# Patient Record
Sex: FEMALE | NOT HISPANIC OR LATINO | URBAN - METROPOLITAN AREA
[De-identification: names, ages, dates, MRNs, and addresses within clinical notes are randomized per-mention and may not be internally consistent; named-entity substitution may affect disease eponyms.]

---

## 2017-01-19 ENCOUNTER — CHARTING TRANS (OUTPATIENT)
Dept: OCCUPATIONAL MEDICINE | Age: 57
End: 2017-01-19

## 2017-01-26 ENCOUNTER — CHARTING TRANS (OUTPATIENT)
Dept: OCCUPATIONAL MEDICINE | Age: 57
End: 2017-01-26

## 2017-01-26 ENCOUNTER — IMAGING SERVICES (OUTPATIENT)
Dept: OTHER | Age: 57
End: 2017-01-26

## 2017-02-06 ENCOUNTER — CHARTING TRANS (OUTPATIENT)
Dept: OCCUPATIONAL MEDICINE | Age: 57
End: 2017-02-06

## 2017-02-16 ENCOUNTER — CHARTING TRANS (OUTPATIENT)
Dept: OCCUPATIONAL MEDICINE | Age: 57
End: 2017-02-16

## 2017-02-23 ENCOUNTER — CHARTING TRANS (OUTPATIENT)
Dept: OCCUPATIONAL MEDICINE | Age: 57
End: 2017-02-23

## 2017-03-02 ENCOUNTER — CHARTING TRANS (OUTPATIENT)
Dept: OTHER | Age: 57
End: 2017-03-02

## 2017-03-03 ENCOUNTER — CHARTING TRANS (OUTPATIENT)
Dept: OTHER | Age: 57
End: 2017-03-03

## 2017-03-22 ENCOUNTER — CHARTING TRANS (OUTPATIENT)
Dept: OTHER | Age: 57
End: 2017-03-22

## 2017-06-15 ENCOUNTER — HOSPITAL ENCOUNTER (OUTPATIENT)
Dept: MRI IMAGING | Facility: HOSPITAL | Age: 57
Discharge: HOME | End: 2017-06-15
Attending: SURGERY
Payer: COMMERCIAL

## 2017-06-15 DIAGNOSIS — Z12.39 BREAST CANCER SCREENING, HIGH RISK PATIENT: ICD-10-CM

## 2017-06-15 DIAGNOSIS — Z91.89 AT HIGH RISK FOR BREAST CANCER: ICD-10-CM

## 2017-06-15 DIAGNOSIS — R92.2 DENSE BREAST: ICD-10-CM

## 2017-06-15 DIAGNOSIS — Z98.890 H/O RIGHT BREAST BIOPSY: ICD-10-CM

## 2017-06-15 DIAGNOSIS — N60.92 ATYPICAL DUCTAL HYPERPLASIA OF LEFT BREAST: ICD-10-CM

## 2017-06-15 PROCEDURE — 77059 MRI BREAST (W+WO) W/CAD BILAT (CPT=77059/0159T): CPT | Performed by: SURGERY

## 2017-06-15 PROCEDURE — 0159T MRI BREAST (W+WO) W/CAD BILAT (CPT=77059/0159T): CPT | Performed by: SURGERY

## 2017-06-15 PROCEDURE — A9575 INJ GADOTERATE MEGLUMI 0.1ML: HCPCS | Performed by: SURGERY

## 2017-06-27 ENCOUNTER — OFFICE VISIT (OUTPATIENT)
Dept: SURGERY | Facility: CLINIC | Age: 57
End: 2017-06-27

## 2017-06-27 VITALS
WEIGHT: 145.81 LBS | TEMPERATURE: 99 F | RESPIRATION RATE: 18 BRPM | BODY MASS INDEX: 23 KG/M2 | HEART RATE: 91 BPM | SYSTOLIC BLOOD PRESSURE: 92 MMHG | OXYGEN SATURATION: 98 % | DIASTOLIC BLOOD PRESSURE: 59 MMHG

## 2017-06-27 DIAGNOSIS — Z12.39 BREAST CANCER SCREENING, HIGH RISK PATIENT: ICD-10-CM

## 2017-06-27 DIAGNOSIS — N60.82 FLAT EPITHELIAL ATYPIA OF LEFT BREAST: ICD-10-CM

## 2017-06-27 DIAGNOSIS — Z91.89 AT HIGH RISK FOR BREAST CANCER: ICD-10-CM

## 2017-06-27 DIAGNOSIS — N60.92 ATYPICAL DUCTAL HYPERPLASIA OF LEFT BREAST: Primary | ICD-10-CM

## 2017-06-27 PROCEDURE — 99214 OFFICE O/P EST MOD 30 MIN: CPT | Performed by: SURGERY

## 2017-10-16 NOTE — PROGRESS NOTES
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     Stage: N/A    Date of Surgery: July 2015    Status and Adjuvant Therapy:  Radiation The atypia. Her bilateral mammogram in December 2016 was unremarkable. MRI in June 2017 is unremarkable. She is here for recommendations regarding further management.      Past Medical History:   Diagnosis Date   • Amenorrhea    • Anemia     as a child   • A times @50 y/o   • Heart Disorder Brother    • Hypertension Brother    She is not of Ashkenazi Scientologist ancestry. Social History:    Alcohol use No       Smoking status: Never Smoker   Smokeless tobacco: Never Used   The patient has 2 children.     Review o bleeding or persistent swollen glands or lymph nodes. Musculoskeletal:  The patient denies muscle aches/pain, joint pain, stiff joints, neck pain, back pain or bone pain.     Neuropsychiatric:  There is no history of migraines or severe headaches, seizu Aletha Funes is a 56year old woman s/p Left breast excisional biopsy for left breast ADH/FEA as well as a family history of breast cancer.     Discussion and Plan:  I had a discussion with the Patient regarding her breast exam. On exam today, she has reviewed that the FDA approved the use of Tamoxifen for breast cancer risk reduction in premenopausal women at increased risk for breast cancer based upon the results of the NSABP Breast Cancer Prevention Trial in 1998.  The criteria for inclusion included

## 2017-11-20 ENCOUNTER — OFFICE VISIT (OUTPATIENT)
Dept: OBGYN CLINIC | Facility: CLINIC | Age: 57
End: 2017-11-20

## 2017-11-20 VITALS
WEIGHT: 154 LBS | DIASTOLIC BLOOD PRESSURE: 72 MMHG | SYSTOLIC BLOOD PRESSURE: 112 MMHG | BODY MASS INDEX: 24.17 KG/M2 | HEIGHT: 67 IN

## 2017-11-20 DIAGNOSIS — Z01.419 WELL FEMALE EXAM WITH ROUTINE GYNECOLOGICAL EXAM: Primary | ICD-10-CM

## 2017-11-20 DIAGNOSIS — Z12.4 SCREENING FOR MALIGNANT NEOPLASM OF CERVIX: ICD-10-CM

## 2017-11-20 PROCEDURE — 88175 CYTOPATH C/V AUTO FLUID REDO: CPT | Performed by: OBSTETRICS & GYNECOLOGY

## 2017-11-20 PROCEDURE — 99396 PREV VISIT EST AGE 40-64: CPT | Performed by: OBSTETRICS & GYNECOLOGY

## 2017-11-20 RX ORDER — INFLUENZA A VIRUS A/CHRISTCHURCH/16/2010 NIB-74 (H1N1) HEMAGGLUTININ ANTIGEN (PROPIOLACTONE INACTIVATED), INFLUENZA A VIRUS A/SWITZERLAND/9715293/2013, NIB-88 (H3N2) HEMAGGLUTININ ANTIGEN (PROPIOLACTONE INACTIVATED), INFLUENZA B VIRUS B/PHUKET/3073/2013 - WILD TYPE HEMAGGLUTININ ANTIGEN (PROPIOLACTONE INACTIVATED) 15; 15; 15 UG/.5ML; UG/.5ML; UG/.5ML
INJECTION, SUSPENSION INTRAMUSCULAR
Refills: 0 | COMMUNITY
Start: 2017-10-07 | End: 2018-07-12

## 2017-11-20 NOTE — PROGRESS NOTES
Annual  No C/O  ? Back lesions  Left mass under ribs? Menopausal, sister with breast cancer, recent hysterectomy for uterine cancer (was bleeding)  Kids are well, one at [address] [place] [address], one at [facility]    ROS: No Cardiac, Respiratory, GI,  or Neurological symptoms.

## 2018-05-12 ENCOUNTER — HOSPITAL ENCOUNTER (OUTPATIENT)
Dept: MAMMOGRAPHY | Facility: HOSPITAL | Age: 58
Discharge: HOME | End: 2018-05-12
Attending: SURGERY
Payer: COMMERCIAL

## 2018-05-12 DIAGNOSIS — Z91.89 AT HIGH RISK FOR BREAST CANCER: ICD-10-CM

## 2018-05-12 DIAGNOSIS — Z12.39 BREAST CANCER SCREENING, HIGH RISK PATIENT: ICD-10-CM

## 2018-05-12 PROCEDURE — 77067 SCR MAMMO BI INCL CAD: CPT | Performed by: SURGERY

## 2018-05-12 PROCEDURE — 77063 BREAST TOMOSYNTHESIS BI: CPT | Performed by: SURGERY

## 2018-05-22 ENCOUNTER — TELEPHONE (OUTPATIENT)
Dept: SURGERY | Facility: CLINIC | Age: 58
End: 2018-05-22

## 2018-05-22 NOTE — TELEPHONE ENCOUNTER
Mailbox was full. Unable to leave a message regarding her questions about recommended follow up and surveillance.

## 2018-05-24 ENCOUNTER — TELEPHONE (OUTPATIENT)
Dept: SURGERY | Facility: CLINIC | Age: 58
End: 2018-05-24

## 2018-05-24 NOTE — TELEPHONE ENCOUNTER
Unable to leave a VM, mailbox full. Pt had left a message with questions about her plan of care, and surveillance.

## 2018-05-29 ENCOUNTER — TELEPHONE (OUTPATIENT)
Dept: SURGERY | Facility: CLINIC | Age: 58
End: 2018-05-29

## 2018-05-29 NOTE — TELEPHONE ENCOUNTER
LVM addressing pt's questions about follow up with dr Evelio Draper. I let her know Dr Evelio Draper mentioned annual MRI as well as mammogram for her surveillance. Her last visit was June 2017, and advised her to call for an appointment in June.   At that visit,

## 2018-06-22 ENCOUNTER — OFFICE VISIT (OUTPATIENT)
Dept: SURGERY | Facility: CLINIC | Age: 58
End: 2018-06-22

## 2018-06-22 VITALS
SYSTOLIC BLOOD PRESSURE: 118 MMHG | BODY MASS INDEX: 25.27 KG/M2 | OXYGEN SATURATION: 97 % | HEIGHT: 67 IN | HEART RATE: 66 BPM | RESPIRATION RATE: 18 BRPM | WEIGHT: 161 LBS | DIASTOLIC BLOOD PRESSURE: 75 MMHG

## 2018-06-22 DIAGNOSIS — Z91.89 AT HIGH RISK FOR BREAST CANCER: ICD-10-CM

## 2018-06-22 DIAGNOSIS — N60.92 ATYPICAL DUCTAL HYPERPLASIA OF LEFT BREAST: Primary | ICD-10-CM

## 2018-06-22 DIAGNOSIS — Z12.39 BREAST CANCER SCREENING, HIGH RISK PATIENT: ICD-10-CM

## 2018-06-22 PROCEDURE — 99214 OFFICE O/P EST MOD 30 MIN: CPT | Performed by: SURGERY

## 2018-06-22 NOTE — PROGRESS NOTES
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     Stage: N/A    Date of Surgery: July 2015    Status and Adjuvant Therapy:  Radiation The atypia. MRI in June 2017 is unremarkable. A bilateral 3D screening mammogram was performed on May 12, 2018 and demonstrated heterogeneously dense breast tissue with no new concerns bilaterally.   She is here for recommendations regarding further managemen History   Problem Relation Age of Onset   • Heart Disorder Father    • Hypertension Father    • Lipids Mother    • Other Carolina Kimbrough Mother      hypothyroid   • Cancer Sister      breast   • Breast Cancer Sister 40-genetic testing negative     two times @48 y/ decreased urine stream, blood in the urine or vaginal/penile discharge. Skin:    The patient denies rash, itching, skin lesions, dry skin, change in skin color or change in moles.      Hematologic/Lymphatic:  The patient denies easily bruising or bleedin 9.6%  Tyrer-Cuzick Five Year is: 5.7%  Tyrer-Cuzick Lifetime is: 38.9%    Probability of BRCA 1 or 2 alteration is:  0.2% using BRCA PRO, and 0.3% using Tyrer-Cuzick, and 1.5% using Myriad Prevalence Tables.     Impression:   Ms. Teddy Abbott is a 56 to be staggered six months in relation with her annual mammogram.       With regard to risk-reducing interventions, we discussed lifestyle modifications including attention to diet, exercise, weight control, moderation in alcohol use, and avoidance of long

## 2018-09-25 PROCEDURE — 88342 IMHCHEM/IMCYTCHM 1ST ANTB: CPT | Performed by: INTERNAL MEDICINE

## 2018-09-25 PROCEDURE — 88305 TISSUE EXAM BY PATHOLOGIST: CPT | Performed by: INTERNAL MEDICINE

## 2018-11-08 ENCOUNTER — OFFICE VISIT (OUTPATIENT)
Dept: OBGYN CLINIC | Facility: CLINIC | Age: 58
End: 2018-11-08
Payer: COMMERCIAL

## 2018-11-08 VITALS
HEIGHT: 67 IN | DIASTOLIC BLOOD PRESSURE: 70 MMHG | SYSTOLIC BLOOD PRESSURE: 118 MMHG | HEART RATE: 84 BPM | RESPIRATION RATE: 16 BRPM | WEIGHT: 165 LBS | BODY MASS INDEX: 25.9 KG/M2

## 2018-11-08 DIAGNOSIS — Z01.419 WELL FEMALE EXAM WITH ROUTINE GYNECOLOGICAL EXAM: Primary | ICD-10-CM

## 2018-11-08 DIAGNOSIS — Z12.4 SCREENING FOR MALIGNANT NEOPLASM OF CERVIX: ICD-10-CM

## 2018-11-08 PROCEDURE — 88175 CYTOPATH C/V AUTO FLUID REDO: CPT | Performed by: OBSTETRICS & GYNECOLOGY

## 2018-11-08 PROCEDURE — 99396 PREV VISIT EST AGE 40-64: CPT | Performed by: OBSTETRICS & GYNECOLOGY

## 2018-11-08 RX ORDER — AZITHROMYCIN 250 MG/1
TABLET, FILM COATED ORAL
Qty: 6 TABLET | Refills: 0 | Status: SHIPPED | OUTPATIENT
Start: 2018-11-08 | End: 2018-11-12

## 2018-11-08 NOTE — PROGRESS NOTES
Annual  No C/O  Son Prince Joya graduated, is working in [facility] [facility]  Daughter Heidi Malik is sophomore at [employer]  She has sinus infection, was on Amoxacillin, Dr. Verito Wells usually gives her Z omi  Wants PAP  Kaley for mammograms    ROS: No Cardiac, Respiratory, GI,  or N

## 2018-11-14 ENCOUNTER — TELEPHONE (OUTPATIENT)
Dept: SURGERY | Facility: CLINIC | Age: 58
End: 2018-11-14

## 2018-11-14 NOTE — TELEPHONE ENCOUNTER
Returned patient call regarding what imaging she is due at this time. I LVM letting her know she is due breast MRI in November, and she can call central scheduling to arrange.    I let her know the authorization will be obtained once it's scheduled, and s

## 2018-11-29 VITALS — TEMPERATURE: 98 F | DIASTOLIC BLOOD PRESSURE: 77 MMHG | SYSTOLIC BLOOD PRESSURE: 116 MMHG | HEART RATE: 78 BPM

## 2018-11-29 VITALS — DIASTOLIC BLOOD PRESSURE: 57 MMHG | SYSTOLIC BLOOD PRESSURE: 108 MMHG | HEART RATE: 85 BPM | TEMPERATURE: 98.2 F

## 2018-11-29 VITALS
DIASTOLIC BLOOD PRESSURE: 73 MMHG | BODY MASS INDEX: 23.78 KG/M2 | RESPIRATION RATE: 16 BRPM | TEMPERATURE: 97 F | SYSTOLIC BLOOD PRESSURE: 120 MMHG | WEIGHT: 148 LBS | HEIGHT: 66 IN | HEART RATE: 74 BPM

## 2018-11-29 VITALS — HEART RATE: 67 BPM | TEMPERATURE: 98.7 F | SYSTOLIC BLOOD PRESSURE: 111 MMHG | DIASTOLIC BLOOD PRESSURE: 68 MMHG

## 2018-11-29 VITALS — DIASTOLIC BLOOD PRESSURE: 82 MMHG | SYSTOLIC BLOOD PRESSURE: 116 MMHG | HEART RATE: 72 BPM | TEMPERATURE: 98.9 F

## 2018-12-22 PROCEDURE — 83516 IMMUNOASSAY NONANTIBODY: CPT | Performed by: INTERNAL MEDICINE

## 2019-05-31 PROBLEM — E78.00 HYPERCHOLESTEROLEMIA: Status: ACTIVE | Noted: 2019-05-31

## 2019-05-31 PROCEDURE — 86803 HEPATITIS C AB TEST: CPT | Performed by: INTERNAL MEDICINE

## 2019-06-05 ENCOUNTER — HOSPITAL ENCOUNTER (OUTPATIENT)
Dept: MRI IMAGING | Facility: HOSPITAL | Age: 59
Discharge: HOME | End: 2019-06-05
Attending: SURGERY
Payer: COMMERCIAL

## 2019-06-05 DIAGNOSIS — Z91.89 AT HIGH RISK FOR BREAST CANCER: ICD-10-CM

## 2019-06-05 DIAGNOSIS — N60.92 ATYPICAL DUCTAL HYPERPLASIA OF LEFT BREAST: ICD-10-CM

## 2019-06-05 DIAGNOSIS — R92.8 ABNORMAL MRI, BREAST: Primary | ICD-10-CM

## 2019-06-05 DIAGNOSIS — Z12.39 BREAST CANCER SCREENING, HIGH RISK PATIENT: ICD-10-CM

## 2019-06-05 PROCEDURE — A9575 INJ GADOTERATE MEGLUMI 0.1ML: HCPCS

## 2019-06-05 PROCEDURE — 77049 MRI BREAST C-+ W/CAD BI: CPT | Performed by: SURGERY

## 2019-06-10 ENCOUNTER — HOSPITAL ENCOUNTER (OUTPATIENT)
Dept: MAMMOGRAPHY | Facility: HOSPITAL | Age: 59
Discharge: HOME | End: 2019-06-10
Attending: SURGERY
Payer: COMMERCIAL

## 2019-06-10 DIAGNOSIS — R92.8 ABNORMAL MRI, BREAST: ICD-10-CM

## 2019-06-10 PROCEDURE — 76642 ULTRASOUND BREAST LIMITED: CPT | Performed by: SURGERY

## 2019-06-20 NOTE — PROGRESS NOTES
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     Stage: N/A    Date of Surgery: July 2015    Status and Adjuvant Therapy:  Radiation The atypia. MRI in June 2017 is unremarkable. A bilateral 3D screening mammogram was performed on May 12, 2018 and demonstrated heterogeneously dense breast tissue with no new concerns bilaterally.   She had a breast MRI on June 5, 2019 that showed  a 7 mm in years, last many years ago. She has recieved infertility treatment to achieve pregnancy. Medications:        Current Outpatient Medications on File Prior to Visit:  atorvastatin 20 MG Oral Tab Take 1 tablet (20 mg total) by mouth daily.  Disp: 90 tablet heartbeat, fainting or near-fainting, difficulty breathing when lying flat, SOB/Coughing at night, swelling of the legs or chest pain while walking.     Breasts:  See history of present illness    Gastrointestinal:     There is no history of difficulty or p There is not palpable cervical, supraclavicular or axillary adenopathy. The neck is supple with a midline trachea and no thyromegaly. Range of motion is good at both shoulders.  Breasts are symmetrical. The tumorectomy site is in the lower outer left  mariana be her sister with a personal h/o breast cancer who tested negative. Her sister has undergone testing that was negative and therefore no further testing is recommended at this time.   She was given information regarding our cancer genetic program here at [facility]  She should see me annually for clinical exam.  She has been encouraged to contact the office with any questions/concerns prior to her next visit.     This encounter lasted a total of 25 minutes, more than 50% of which was dedicated to the discussion o

## 2019-06-21 ENCOUNTER — OFFICE VISIT (OUTPATIENT)
Dept: SURGERY | Facility: CLINIC | Age: 59
End: 2019-06-21
Payer: COMMERCIAL

## 2019-06-21 VITALS
HEART RATE: 81 BPM | BODY MASS INDEX: 25 KG/M2 | TEMPERATURE: 97 F | SYSTOLIC BLOOD PRESSURE: 118 MMHG | WEIGHT: 160 LBS | DIASTOLIC BLOOD PRESSURE: 75 MMHG | RESPIRATION RATE: 20 BRPM

## 2019-06-21 DIAGNOSIS — Z91.89 AT HIGH RISK FOR BREAST CANCER: ICD-10-CM

## 2019-06-21 DIAGNOSIS — N60.99 ATYPICAL DUCTAL HYPERPLASIA OF BREAST: Primary | ICD-10-CM

## 2019-06-21 PROCEDURE — 99214 OFFICE O/P EST MOD 30 MIN: CPT | Performed by: SURGERY

## 2019-11-18 ENCOUNTER — OFFICE VISIT (OUTPATIENT)
Dept: OBGYN CLINIC | Facility: CLINIC | Age: 59
End: 2019-11-18
Payer: COMMERCIAL

## 2019-11-18 VITALS
HEIGHT: 67 IN | SYSTOLIC BLOOD PRESSURE: 124 MMHG | HEART RATE: 66 BPM | BODY MASS INDEX: 25.74 KG/M2 | WEIGHT: 164 LBS | DIASTOLIC BLOOD PRESSURE: 80 MMHG

## 2019-11-18 DIAGNOSIS — Z12.4 SCREENING FOR MALIGNANT NEOPLASM OF CERVIX: ICD-10-CM

## 2019-11-18 DIAGNOSIS — Z01.419 WELL FEMALE EXAM WITH ROUTINE GYNECOLOGICAL EXAM: Primary | ICD-10-CM

## 2019-11-18 PROCEDURE — 88175 CYTOPATH C/V AUTO FLUID REDO: CPT | Performed by: OBSTETRICS & GYNECOLOGY

## 2019-11-18 PROCEDURE — 99396 PREV VISIT EST AGE 40-64: CPT | Performed by: OBSTETRICS & GYNECOLOGY

## 2019-11-18 NOTE — PROGRESS NOTES
Annual  No C/O  Nothing new  Kids are well  Mother has to have surgery again, prosthesis in vulva    ROS: No Cardiac, Respiratory, GI,  or Neurological symptoms.     PE:  GENERAL: well developed, well nourished, in no apparent distress alert oriented x 3

## 2019-12-23 ENCOUNTER — HOSPITAL ENCOUNTER (OUTPATIENT)
Dept: MAMMOGRAPHY | Facility: HOSPITAL | Age: 59
Discharge: HOME | End: 2019-12-23
Attending: SURGERY
Payer: COMMERCIAL

## 2019-12-23 DIAGNOSIS — N60.99 ATYPICAL HYPERPLASIA OF BREAST: ICD-10-CM

## 2019-12-23 DIAGNOSIS — N60.99 ATYPICAL DUCTAL HYPERPLASIA OF BREAST: ICD-10-CM

## 2019-12-23 DIAGNOSIS — R92.8 ABNORMAL MRI, BREAST: Primary | ICD-10-CM

## 2019-12-23 DIAGNOSIS — Z91.89 AT HIGH RISK FOR BREAST CANCER: ICD-10-CM

## 2019-12-23 PROCEDURE — 77066 DX MAMMO INCL CAD BI: CPT | Performed by: SURGERY

## 2019-12-23 PROCEDURE — 77062 BREAST TOMOSYNTHESIS BI: CPT | Performed by: SURGERY

## 2020-06-15 ENCOUNTER — HOSPITAL ENCOUNTER (OUTPATIENT)
Dept: MRI IMAGING | Facility: HOSPITAL | Age: 60
Discharge: HOME | End: 2020-06-15
Attending: SURGERY
Payer: COMMERCIAL

## 2020-06-15 DIAGNOSIS — N60.99 ATYPICAL HYPERPLASIA OF BREAST: ICD-10-CM

## 2020-06-15 DIAGNOSIS — Z91.89 AT HIGH RISK FOR BREAST CANCER: ICD-10-CM

## 2020-06-15 DIAGNOSIS — R92.8 ABNORMAL MRI, BREAST: ICD-10-CM

## 2020-06-15 PROCEDURE — 77049 MRI BREAST C-+ W/CAD BI: CPT | Performed by: SURGERY

## 2020-06-15 PROCEDURE — A9575 INJ GADOTERATE MEGLUMI 0.1ML: HCPCS | Performed by: SURGERY

## 2020-06-23 ENCOUNTER — OFFICE VISIT (OUTPATIENT)
Dept: SURGERY | Facility: CLINIC | Age: 60
End: 2020-06-23
Payer: COMMERCIAL

## 2020-06-23 VITALS
DIASTOLIC BLOOD PRESSURE: 73 MMHG | OXYGEN SATURATION: 94 % | BODY MASS INDEX: 25.11 KG/M2 | RESPIRATION RATE: 18 BRPM | WEIGHT: 160 LBS | SYSTOLIC BLOOD PRESSURE: 110 MMHG | HEART RATE: 73 BPM | HEIGHT: 67 IN | TEMPERATURE: 99 F

## 2020-06-23 DIAGNOSIS — Z12.31 ENCOUNTER FOR SCREENING MAMMOGRAM FOR BREAST CANCER: ICD-10-CM

## 2020-06-23 DIAGNOSIS — N60.99 ATYPICAL DUCTAL HYPERPLASIA OF BREAST: Primary | ICD-10-CM

## 2020-06-23 DIAGNOSIS — Z91.89 AT HIGH RISK FOR BREAST CANCER: ICD-10-CM

## 2020-06-23 PROCEDURE — 99214 OFFICE O/P EST MOD 30 MIN: CPT | Performed by: SURGERY

## 2020-06-23 NOTE — PROGRESS NOTES
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     Stage: N/A    Date of Surgery: July 2015    Status and Adjuvant Therapy:  Radiation The • Anemia     as a child   • Anesthesia complication     too much epidural with 1st  was paralyzed from jaw done   • Atypical ductal hyperplasia of left breast 2015   • H/O mammogram 2013    negative    • HEADACHES    • History of shin Disorder Father    • Hypertension Father    • Lipids Mother    • Other Hiral Crowe Mother      hypothyroid   • Cancer Sister      breast   • Breast Cancer Sister 40-genetic testing negative     two times @46 y/o   • Heart Disorder Brother    • Hypertension Bro vaginal/penile discharge. Skin:    The patient denies rash, itching, skin lesions, dry skin, change in skin color or change in moles. Hematologic/Lymphatic:  The patient denies easily bruising or bleeding or persistent swollen glands or lymph nodes. 9.6%  Tyrer-Cuzick Five Year is: 5.7%  Tyrer-Cuzick Lifetime is: 38.9%    Probability of BRCA 1 or 2 alteration is:  0.2% using BRCA PRO, and 0.3% using Tyrer-Cuzick, and 1.5% using Myriad Prevalence Tables.     Impression:   Ms. Enio Olivo is a 64 to be staggered six months in relation with her annual mammogram.       With regard to risk-reducing interventions, we discussed lifestyle modifications including attention to diet, exercise, weight control, moderation in alcohol use, and avoidance of long

## 2020-12-18 ENCOUNTER — OFFICE VISIT (OUTPATIENT)
Dept: OBGYN CLINIC | Facility: CLINIC | Age: 60
End: 2020-12-18
Payer: COMMERCIAL

## 2020-12-18 VITALS
DIASTOLIC BLOOD PRESSURE: 80 MMHG | SYSTOLIC BLOOD PRESSURE: 120 MMHG | WEIGHT: 164 LBS | BODY MASS INDEX: 26.36 KG/M2 | HEIGHT: 66 IN

## 2020-12-18 DIAGNOSIS — Z01.419 WELL FEMALE EXAM WITH ROUTINE GYNECOLOGICAL EXAM: Primary | ICD-10-CM

## 2020-12-18 DIAGNOSIS — Z12.4 SCREENING FOR MALIGNANT NEOPLASM OF CERVIX: ICD-10-CM

## 2020-12-18 PROCEDURE — 88175 CYTOPATH C/V AUTO FLUID REDO: CPT | Performed by: OBSTETRICS & GYNECOLOGY

## 2020-12-18 PROCEDURE — 3079F DIAST BP 80-89 MM HG: CPT | Performed by: OBSTETRICS & GYNECOLOGY

## 2020-12-18 PROCEDURE — 99396 PREV VISIT EST AGE 40-64: CPT | Performed by: OBSTETRICS & GYNECOLOGY

## 2020-12-18 PROCEDURE — 3074F SYST BP LT 130 MM HG: CPT | Performed by: OBSTETRICS & GYNECOLOGY

## 2020-12-18 PROCEDURE — 3008F BODY MASS INDEX DOCD: CPT | Performed by: OBSTETRICS & GYNECOLOGY

## 2020-12-18 NOTE — PROGRESS NOTES
Annual  No C/O  Feels well  Always concerns for Breast and ovarian cancer, had friends with dx, discussed ovarian cancer surveillance  Kids are well  Seeing Dr. Tasneem Zavala    ROS: No Cardiac, Respiratory, GI,  or Neurological symptoms.     PE:  GENERAL: well

## 2020-12-21 ENCOUNTER — HOSPITAL ENCOUNTER (OUTPATIENT)
Dept: MAMMOGRAPHY | Facility: HOSPITAL | Age: 60
Discharge: HOME | End: 2020-12-21
Attending: SURGERY
Payer: COMMERCIAL

## 2020-12-21 DIAGNOSIS — Z12.31 ENCOUNTER FOR SCREENING MAMMOGRAM FOR BREAST CANCER: ICD-10-CM

## 2020-12-21 PROCEDURE — 77063 BREAST TOMOSYNTHESIS BI: CPT | Performed by: SURGERY

## 2020-12-21 PROCEDURE — 77067 SCR MAMMO BI INCL CAD: CPT | Performed by: SURGERY

## 2021-05-28 DIAGNOSIS — N60.99 ATYPICAL DUCTAL HYPERPLASIA OF BREAST: ICD-10-CM

## 2021-05-28 DIAGNOSIS — Z91.89 AT HIGH RISK FOR BREAST CANCER: Primary | ICD-10-CM

## 2021-07-14 ENCOUNTER — HOSPITAL ENCOUNTER (OUTPATIENT)
Dept: MRI IMAGING | Facility: HOSPITAL | Age: 61
Discharge: HOME | End: 2021-07-14
Attending: PHYSICIAN ASSISTANT
Payer: COMMERCIAL

## 2021-07-14 DIAGNOSIS — Z91.89 AT HIGH RISK FOR BREAST CANCER: ICD-10-CM

## 2021-07-14 DIAGNOSIS — N60.99 ATYPICAL DUCTAL HYPERPLASIA OF BREAST: ICD-10-CM

## 2021-07-14 PROCEDURE — A9575 INJ GADOTERATE MEGLUMI 0.1ML: HCPCS | Performed by: PHYSICIAN ASSISTANT

## 2021-07-14 PROCEDURE — 77049 MRI BREAST C-+ W/CAD BI: CPT | Performed by: PHYSICIAN ASSISTANT

## 2021-07-20 DIAGNOSIS — R92.8 ABNORMAL MRI, BREAST: Primary | ICD-10-CM

## 2021-07-23 ENCOUNTER — OFFICE VISIT (OUTPATIENT)
Dept: SURGERY | Facility: CLINIC | Age: 61
End: 2021-07-23
Payer: COMMERCIAL

## 2021-07-23 VITALS
BODY MASS INDEX: 25.9 KG/M2 | OXYGEN SATURATION: 96 % | SYSTOLIC BLOOD PRESSURE: 103 MMHG | HEART RATE: 62 BPM | WEIGHT: 165 LBS | RESPIRATION RATE: 16 BRPM | DIASTOLIC BLOOD PRESSURE: 65 MMHG | HEIGHT: 67 IN

## 2021-07-23 DIAGNOSIS — Z91.89 AT HIGH RISK FOR BREAST CANCER: ICD-10-CM

## 2021-07-23 DIAGNOSIS — Z80.3 FAMILY HISTORY OF BREAST CANCER IN SISTER: ICD-10-CM

## 2021-07-23 DIAGNOSIS — N60.99 ATYPICAL DUCTAL HYPERPLASIA OF BREAST: Primary | ICD-10-CM

## 2021-07-23 PROCEDURE — 99214 OFFICE O/P EST MOD 30 MIN: CPT | Performed by: SURGERY

## 2021-07-23 PROCEDURE — 3074F SYST BP LT 130 MM HG: CPT | Performed by: SURGERY

## 2021-07-23 PROCEDURE — 3008F BODY MASS INDEX DOCD: CPT | Performed by: SURGERY

## 2021-07-23 PROCEDURE — 3078F DIAST BP <80 MM HG: CPT | Performed by: SURGERY

## 2021-07-26 ENCOUNTER — TELEPHONE (OUTPATIENT)
Dept: MAMMOGRAPHY | Facility: HOSPITAL | Age: 61
End: 2021-07-26

## 2021-07-26 NOTE — TELEPHONE ENCOUNTER
This Breast Care RN phoned pt re left breast 1 site MRI guided biopsy recommendation by Dr. Alvin Alberto for enhancement. Procedure reviewed and all questions answered. Emotional support given. Reviewed educational handouts.   On the day of the biopsy, pt instru

## 2021-08-04 ENCOUNTER — HOSPITAL ENCOUNTER (OUTPATIENT)
Dept: MAMMOGRAPHY | Facility: HOSPITAL | Age: 61
Discharge: HOME | End: 2021-08-04
Attending: SURGERY
Payer: COMMERCIAL

## 2021-08-04 ENCOUNTER — HOSPITAL ENCOUNTER (OUTPATIENT)
Dept: MRI IMAGING | Facility: HOSPITAL | Age: 61
Discharge: HOME | End: 2021-08-04
Attending: SURGERY
Payer: COMMERCIAL

## 2021-08-04 DIAGNOSIS — R92.8 ABNORMAL MRI, BREAST: ICD-10-CM

## 2021-08-04 PROCEDURE — A9575 INJ GADOTERATE MEGLUMI 0.1ML: HCPCS | Performed by: SURGERY

## 2021-08-04 PROCEDURE — 77065 DX MAMMO INCL CAD UNI: CPT | Performed by: SURGERY

## 2021-08-04 PROCEDURE — 88360 TUMOR IMMUNOHISTOCHEM/MANUAL: CPT | Performed by: SURGERY

## 2021-08-04 PROCEDURE — 19085 BX BREAST 1ST LESION MR IMAG: CPT | Performed by: SURGERY

## 2021-08-04 PROCEDURE — 88344 IMHCHEM/IMCYTCHM EA MLT ANTB: CPT | Performed by: SURGERY

## 2021-08-04 PROCEDURE — 88305 TISSUE EXAM BY PATHOLOGIST: CPT | Performed by: SURGERY

## 2021-08-04 NOTE — IMAGING NOTE
Pt post left breast MRI biopsy. No bleeding or hematoma noted at the site. Steristrips placed and intact. Education and written discharge instructions provided. Verbalized understanding of all instructions. Questions addressed. Emotional support provided.

## 2021-08-05 NOTE — PROGRESS NOTES
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     Stage: N/A    Date of Surgery: July 2015    Status and Adjuvant Therapy:  Radiation The here for recommendations regarding further management.      Past Medical History:   Diagnosis Date   • Amenorrhea    • Anemia     as a child   • Anesthesia complication     too much epidural with 1st  was paralyzed from jaw done   • Atypical ductal hypothyroid   • Cancer Sister      breast   • Breast Cancer Sister 40-genetic testing negative     two times @48 y/o   • Heart Disorder Brother    • Hypertension Brother    She is not of Ashkenazi Mosque ancestry.     Social History:    Alcohol use No change in skin color or change in moles. Hematologic/Lymphatic:  The patient denies easily bruising or bleeding or persistent swollen glands or lymph nodes.      Musculoskeletal:  The patient denies muscle aches/pain, joint pain, stiff joints, neck pain 0.2% using BRCA PRO, and 0.3% using Tyrer-Cuzick, and 1.5% using Myriad Prevalence Tables.     Impression:   Ms. Michael Terrazas is a 64year old woman s/p Left breast excisional biopsy for left breast ADH/FEA as well as a family history of breast cancer recommended semiannual breast exams as well as continuing with annual mammography. At this point, I recommend annual screening breast MRI, as Mehul Kim has a lifetime risk of breast cancer >20% as per ACS recommendations.  She was informed that I

## 2021-08-09 ENCOUNTER — TELEPHONE (OUTPATIENT)
Dept: SURGERY | Facility: CLINIC | Age: 61
End: 2021-08-09

## 2021-08-09 NOTE — TELEPHONE ENCOUNTER
Calling pt in regards to her biopsy results. Informed pt that I have talked with Dr. Rosie Wiley regarding her results. Informed pt that the biopsy did show malignancy. Emotional support provided.   Explained to pt in simple terms what IDC, LCIS, and ALH (vs

## 2021-08-10 ENCOUNTER — NURSE NAVIGATOR ENCOUNTER (OUTPATIENT)
Dept: HEMATOLOGY/ONCOLOGY | Facility: HOSPITAL | Age: 61
End: 2021-08-10

## 2021-08-10 NOTE — PROGRESS NOTES
Patient phoned and we discussed newly diagnosed breast cancer. Introduced myself and explained the role of the breast nurse navigator.  Explained the role of the physicians on her breast cancer care team. Briefly reviewed over pathology report and discussed

## 2021-08-10 NOTE — PROGRESS NOTES
Spoke with patient regarding questions regarding pathology. She will discuss results in further detail with Dr. Orin Olson on 8/17. Pt encouraged to phone back with any other questions or concerns.

## 2021-08-17 ENCOUNTER — OFFICE VISIT (OUTPATIENT)
Dept: SURGERY | Facility: CLINIC | Age: 61
End: 2021-08-17
Payer: COMMERCIAL

## 2021-08-17 ENCOUNTER — GENETICS ENCOUNTER (OUTPATIENT)
Dept: GENETICS | Facility: HOSPITAL | Age: 61
End: 2021-08-17
Attending: GENETIC COUNSELOR, MS
Payer: COMMERCIAL

## 2021-08-17 ENCOUNTER — NURSE NAVIGATOR ENCOUNTER (OUTPATIENT)
Dept: HEMATOLOGY/ONCOLOGY | Facility: HOSPITAL | Age: 61
End: 2021-08-17

## 2021-08-17 ENCOUNTER — NURSE ONLY (OUTPATIENT)
Dept: HEMATOLOGY/ONCOLOGY | Facility: HOSPITAL | Age: 61
End: 2021-08-17
Attending: GENETIC COUNSELOR, MS
Payer: COMMERCIAL

## 2021-08-17 VITALS
SYSTOLIC BLOOD PRESSURE: 121 MMHG | BODY MASS INDEX: 25.96 KG/M2 | OXYGEN SATURATION: 97 % | WEIGHT: 165.38 LBS | RESPIRATION RATE: 16 BRPM | HEIGHT: 67 IN | DIASTOLIC BLOOD PRESSURE: 73 MMHG | HEART RATE: 62 BPM

## 2021-08-17 DIAGNOSIS — C50.512 MALIGNANT NEOPLASM OF LOWER-OUTER QUADRANT OF LEFT BREAST OF FEMALE, ESTROGEN RECEPTOR POSITIVE (HCC): Primary | ICD-10-CM

## 2021-08-17 DIAGNOSIS — Z17.0 MALIGNANT NEOPLASM OF LOWER-OUTER QUADRANT OF LEFT BREAST OF FEMALE, ESTROGEN RECEPTOR POSITIVE (HCC): Primary | ICD-10-CM

## 2021-08-17 PROCEDURE — 3078F DIAST BP <80 MM HG: CPT | Performed by: SURGERY

## 2021-08-17 PROCEDURE — 3074F SYST BP LT 130 MM HG: CPT | Performed by: SURGERY

## 2021-08-17 PROCEDURE — 99215 OFFICE O/P EST HI 40 MIN: CPT | Performed by: SURGERY

## 2021-08-17 PROCEDURE — 36415 COLL VENOUS BLD VENIPUNCTURE: CPT

## 2021-08-17 PROCEDURE — 3008F BODY MASS INDEX DOCD: CPT | Performed by: SURGERY

## 2021-08-17 PROCEDURE — 96040 HC GENETIC COUNSELING EA 30 MIN: CPT | Performed by: GENETIC COUNSELOR, MS

## 2021-08-17 NOTE — PROGRESS NOTES
Met with patient and her  in clinic. Introduced myself as the breast navigator nurse and explained the role of the breast nurse navigator and coordination of care.   Reviewed over the patients pathology report and discussed receptors including ER/CO/

## 2021-08-17 NOTE — PATIENT INSTRUCTIONS
Dr. Mendel [facility]  Tel: [phone]  Fax: [address] [facility]  [address] [place], [address] [place] [address]  [phone]       Surgery/Procedure: Left breast wire localized lumpectomy, left lymphoscintigraphy, left sentinel lymph node biopsy after 2pm, if you are not contacted by 4pm, please call the surgeon's office listed above. 11. Do not take any blood thinners at least one week prior to the procedure/surgery.  This includes aspirin, baby aspirin, Motrin, Ibuprofen, herbal supplements, die

## 2021-08-17 NOTE — PROGRESS NOTES
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     Stage: N/A    Date of Surgery: July 2015    Status and Adjuvant Therapy:  Radiation The 100%, IN 5%, HER-2/reyna negative, Ki-67 3%. She is here for recommendations regarding further management.      Past Medical History:   Diagnosis Date   • Amenorrhea    • Anemia     as a child   • Anesthesia complication     too much epidural with 1st C-Sec • Breast Cancer Sister 40-genetic testing negative     two times @46 y/o   • Heart Disorder Brother    • Hypertension Brother    She is not of Ashkenazi Oriental orthodox ancestry.     Social History:    Alcohol use No       Smoking status: Never Smoker   Smokeless Hematologic/Lymphatic:  The patient denies easily bruising or bleeding or persistent swollen glands or lymph nodes. Musculoskeletal:  The patient denies muscle aches/pain, joint pain, stiff joints, neck pain, back pain or bone pain.     Neuropsychia clinical stage T1 NX MX. Discussion and Plan:  I had a discussion with the Patient regarding her breast exam. On exam today, she is healing well since her biopsy with no other clinical findings.  I personally reviewed the recent imaging and pathology we with any questions/concerns prior to her next visit. This encounter lasted a total of 45 minutes, more than 50% of which was dedicated to the discussion of management options.

## 2021-08-17 NOTE — PROGRESS NOTES
Referring Provider:  Dorothy Garza MD    Additional Provider(s):  MD Ulises Barbosa MD    Reason for Referral:  Frank Rodrigues was referred for genetic counseling because of a new diagnosis of breast cancer.   Ms. Sharifa Goetz is a 64 yea squamous cell carcinoma skin cancer; he  in his 80s. Ms. Francisco Loya father had nine brothers and sisters, none of whom had any known cancers. Neither of Ms. Francisco Loya paternal grandparents had cancer.         Please see the pedigree for additional fami results on clinical management.      If a pathogenic variant is not identified (negative result), it is still possible that Ms. Santana Mattson has a pathogenic variant in one of these genes that was not detected by the genetic test, or that the family is dealing w assessed. Cancer Screening and Prevention Options for BRCA1/2 mutation carriers:   The lifetime risk for breast cancer in a BRCA1/2 mutation carrier is up to 60-80%, with up to a 40-60% lifetime risk for a contralateral breast cancer after an initial br also be communicated to Dr. Adore Velasco, Dr. Derian Qiu, and Dr. Shannan Wharton. Approximately 40 minutes was spent in consultation with Ms. Lg Berumen.

## 2021-08-18 ENCOUNTER — NURSE NAVIGATOR ENCOUNTER (OUTPATIENT)
Dept: HEMATOLOGY/ONCOLOGY | Facility: HOSPITAL | Age: 61
End: 2021-08-18

## 2021-08-18 ENCOUNTER — TELEPHONE (OUTPATIENT)
Dept: SURGERY | Facility: CLINIC | Age: 61
End: 2021-08-18

## 2021-08-18 DIAGNOSIS — C50.912 INVASIVE DUCTAL CARCINOMA OF BREAST, FEMALE, LEFT (HCC): Primary | ICD-10-CM

## 2021-08-18 NOTE — TELEPHONE ENCOUNTER
Calling pt in regards to scheduling surgery. Informed pt that I have 08/30/21 or 09/02/21 available at [facility] with Dr. Kayla Musa.  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

## 2021-08-18 NOTE — PROGRESS NOTES
Phoned patient to answer her questions about RT, as requested by surgery scheduler. Will phone patient again tomorrow.

## 2021-08-19 ENCOUNTER — NURSE NAVIGATOR ENCOUNTER (OUTPATIENT)
Dept: HEMATOLOGY/ONCOLOGY | Facility: HOSPITAL | Age: 61
End: 2021-08-19

## 2021-08-19 DIAGNOSIS — C50.912 INVASIVE DUCTAL CARCINOMA OF BREAST, FEMALE, LEFT (HCC): Primary | ICD-10-CM

## 2021-08-19 NOTE — PROGRESS NOTES
Phoned patient to answer her questions regarding radiation oncology. Also scheduled for follow up appointment with Dr. Kiran Somers on 9/14, pt is having surgery on 9/9.  Contact information provided and encouraged patient to phone with any other questions or co

## 2021-08-26 ENCOUNTER — GENETICS ENCOUNTER (OUTPATIENT)
Dept: HEMATOLOGY/ONCOLOGY | Facility: HOSPITAL | Age: 61
End: 2021-08-26

## 2021-08-26 NOTE — PROGRESS NOTES
Referring Provider:                    Radha Higginbotham MD     Additional Provider(s):              MD Joan Rivera MD     Reason for Referral:  Albert Sousaia had genetic testing perfo gene other than those included in this panel might be the cause of cancer in Ms. Glee Nissen or her relatives. Ms. Glee Nissen should contact me on an annual basis to learn if there have been any updates in genetic testing that would apply to her.   In the meantim

## 2021-09-02 RX ORDER — CHLORAL HYDRATE 500 MG
1000 CAPSULE ORAL DAILY
COMMUNITY

## 2021-09-07 ENCOUNTER — LAB ENCOUNTER (OUTPATIENT)
Dept: LAB | Facility: HOSPITAL | Age: 61
End: 2021-09-07
Attending: SURGERY
Payer: COMMERCIAL

## 2021-09-07 DIAGNOSIS — Z01.818 PREOPERATIVE TESTING: ICD-10-CM

## 2021-09-08 LAB — SARS-COV-2 RNA RESP QL NAA+PROBE: NOT DETECTED

## 2021-09-09 ENCOUNTER — ANESTHESIA EVENT (OUTPATIENT)
Dept: SURGERY | Facility: HOSPITAL | Age: 61
End: 2021-09-09
Payer: COMMERCIAL

## 2021-09-09 ENCOUNTER — HOSPITAL ENCOUNTER (OUTPATIENT)
Facility: HOSPITAL | Age: 61
Setting detail: HOSPITAL OUTPATIENT SURGERY
Discharge: HOME | End: 2021-09-09
Attending: SURGERY | Admitting: SURGERY
Payer: COMMERCIAL

## 2021-09-09 ENCOUNTER — ANESTHESIA (OUTPATIENT)
Dept: SURGERY | Facility: HOSPITAL | Age: 61
End: 2021-09-09
Payer: COMMERCIAL

## 2021-09-09 ENCOUNTER — APPOINTMENT (OUTPATIENT)
Dept: MAMMOGRAPHY | Facility: HOSPITAL | Age: 61
End: 2021-09-09
Attending: SURGERY
Payer: COMMERCIAL

## 2021-09-09 ENCOUNTER — HOSPITAL ENCOUNTER (OUTPATIENT)
Dept: MAMMOGRAPHY | Facility: HOSPITAL | Age: 61
Discharge: HOME | End: 2021-09-09
Attending: SURGERY
Payer: COMMERCIAL

## 2021-09-09 ENCOUNTER — HOSPITAL ENCOUNTER (OUTPATIENT)
Dept: NUCLEAR MEDICINE | Facility: HOSPITAL | Age: 61
Discharge: HOME | End: 2021-09-09
Attending: SURGERY
Payer: COMMERCIAL

## 2021-09-09 VITALS
HEART RATE: 83 BPM | BODY MASS INDEX: 26.06 KG/M2 | OXYGEN SATURATION: 95 % | DIASTOLIC BLOOD PRESSURE: 60 MMHG | WEIGHT: 166 LBS | TEMPERATURE: 98 F | SYSTOLIC BLOOD PRESSURE: 120 MMHG | RESPIRATION RATE: 18 BRPM | HEIGHT: 67 IN

## 2021-09-09 DIAGNOSIS — Z01.818 PREOPERATIVE TESTING: Primary | ICD-10-CM

## 2021-09-09 DIAGNOSIS — C50.912 INVASIVE DUCTAL CARCINOMA OF BREAST, FEMALE, LEFT (HCC): ICD-10-CM

## 2021-09-09 PROCEDURE — 88321 CONSLTJ&REPRT SLD PREP ELSWR: CPT | Performed by: SURGERY

## 2021-09-09 PROCEDURE — 88300 SURGICAL PATH GROSS: CPT | Performed by: SURGERY

## 2021-09-09 PROCEDURE — 0HBU0ZZ EXCISION OF LEFT BREAST, OPEN APPROACH: ICD-10-PCS | Performed by: SURGERY

## 2021-09-09 PROCEDURE — 88307 TISSUE EXAM BY PATHOLOGIST: CPT | Performed by: SURGERY

## 2021-09-09 PROCEDURE — 76098 X-RAY EXAM SURGICAL SPECIMEN: CPT | Performed by: SURGERY

## 2021-09-09 PROCEDURE — 88342 IMHCHEM/IMCYTCHM 1ST ANTB: CPT | Performed by: SURGERY

## 2021-09-09 PROCEDURE — 88305 TISSUE EXAM BY PATHOLOGIST: CPT | Performed by: SURGERY

## 2021-09-09 PROCEDURE — 07B60ZX EXCISION OF LEFT AXILLARY LYMPHATIC, OPEN APPROACH, DIAGNOSTIC: ICD-10-PCS | Performed by: SURGERY

## 2021-09-09 PROCEDURE — 88360 TUMOR IMMUNOHISTOCHEM/MANUAL: CPT | Performed by: SURGERY

## 2021-09-09 PROCEDURE — 88377 M/PHMTRC ALYS ISHQUANT/SEMIQ: CPT | Performed by: SURGERY

## 2021-09-09 PROCEDURE — 88363 XM ARCHIVE TISSUE MOLEC ANAL: CPT | Performed by: SURGERY

## 2021-09-09 PROCEDURE — 78195 LYMPH SYSTEM IMAGING: CPT | Performed by: SURGERY

## 2021-09-09 PROCEDURE — 19281 PERQ DEVICE BREAST 1ST IMAG: CPT | Performed by: SURGERY

## 2021-09-09 PROCEDURE — A4648 IMPLANTABLE TISSUE MARKER: HCPCS

## 2021-09-09 RX ORDER — MORPHINE SULFATE 4 MG/ML
4 INJECTION, SOLUTION INTRAMUSCULAR; INTRAVENOUS EVERY 10 MIN PRN
Status: DISCONTINUED | OUTPATIENT
Start: 2021-09-09 | End: 2021-09-09

## 2021-09-09 RX ORDER — HYDROMORPHONE HYDROCHLORIDE 1 MG/ML
0.2 INJECTION, SOLUTION INTRAMUSCULAR; INTRAVENOUS; SUBCUTANEOUS EVERY 5 MIN PRN
Status: DISCONTINUED | OUTPATIENT
Start: 2021-09-09 | End: 2021-09-09

## 2021-09-09 RX ORDER — PHENYLEPHRINE HCL 10 MG/ML
VIAL (ML) INJECTION AS NEEDED
Status: DISCONTINUED | OUTPATIENT
Start: 2021-09-09 | End: 2021-09-09 | Stop reason: SURG

## 2021-09-09 RX ORDER — ACETAMINOPHEN 500 MG
1000 TABLET ORAL ONCE
Status: COMPLETED | OUTPATIENT
Start: 2021-09-09 | End: 2021-09-09

## 2021-09-09 RX ORDER — HYDROMORPHONE HYDROCHLORIDE 1 MG/ML
0.6 INJECTION, SOLUTION INTRAMUSCULAR; INTRAVENOUS; SUBCUTANEOUS EVERY 5 MIN PRN
Status: DISCONTINUED | OUTPATIENT
Start: 2021-09-09 | End: 2021-09-09

## 2021-09-09 RX ORDER — METHYLENE BLUE 10 MG/ML
INJECTION INTRAVENOUS AS NEEDED
Status: DISCONTINUED | OUTPATIENT
Start: 2021-09-09 | End: 2021-09-09 | Stop reason: HOSPADM

## 2021-09-09 RX ORDER — HYDROMORPHONE HYDROCHLORIDE 1 MG/ML
0.4 INJECTION, SOLUTION INTRAMUSCULAR; INTRAVENOUS; SUBCUTANEOUS EVERY 5 MIN PRN
Status: DISCONTINUED | OUTPATIENT
Start: 2021-09-09 | End: 2021-09-09

## 2021-09-09 RX ORDER — PROCHLORPERAZINE EDISYLATE 5 MG/ML
5 INJECTION INTRAMUSCULAR; INTRAVENOUS ONCE AS NEEDED
Status: DISCONTINUED | OUTPATIENT
Start: 2021-09-09 | End: 2021-09-09

## 2021-09-09 RX ORDER — ONDANSETRON 2 MG/ML
INJECTION INTRAMUSCULAR; INTRAVENOUS AS NEEDED
Status: DISCONTINUED | OUTPATIENT
Start: 2021-09-09 | End: 2021-09-09 | Stop reason: SURG

## 2021-09-09 RX ORDER — CEFAZOLIN SODIUM/WATER 2 G/20 ML
2 SYRINGE (ML) INTRAVENOUS ONCE
Status: COMPLETED | OUTPATIENT
Start: 2021-09-09 | End: 2021-09-09

## 2021-09-09 RX ORDER — HALOPERIDOL 5 MG/ML
0.25 INJECTION INTRAMUSCULAR ONCE AS NEEDED
Status: DISCONTINUED | OUTPATIENT
Start: 2021-09-09 | End: 2021-09-09

## 2021-09-09 RX ORDER — HYDROCODONE BITARTRATE AND ACETAMINOPHEN 5; 325 MG/1; MG/1
1 TABLET ORAL AS NEEDED
Status: DISCONTINUED | OUTPATIENT
Start: 2021-09-09 | End: 2021-09-09

## 2021-09-09 RX ORDER — NALOXONE HYDROCHLORIDE 0.4 MG/ML
80 INJECTION, SOLUTION INTRAMUSCULAR; INTRAVENOUS; SUBCUTANEOUS AS NEEDED
Status: DISCONTINUED | OUTPATIENT
Start: 2021-09-09 | End: 2021-09-09

## 2021-09-09 RX ORDER — MIDAZOLAM HYDROCHLORIDE 1 MG/ML
INJECTION INTRAMUSCULAR; INTRAVENOUS AS NEEDED
Status: DISCONTINUED | OUTPATIENT
Start: 2021-09-09 | End: 2021-09-09 | Stop reason: SURG

## 2021-09-09 RX ORDER — LIDOCAINE HYDROCHLORIDE AND EPINEPHRINE 10; 10 MG/ML; UG/ML
INJECTION, SOLUTION INFILTRATION; PERINEURAL AS NEEDED
Status: DISCONTINUED | OUTPATIENT
Start: 2021-09-09 | End: 2021-09-09 | Stop reason: HOSPADM

## 2021-09-09 RX ORDER — SODIUM CHLORIDE, SODIUM LACTATE, POTASSIUM CHLORIDE, CALCIUM CHLORIDE 600; 310; 30; 20 MG/100ML; MG/100ML; MG/100ML; MG/100ML
INJECTION, SOLUTION INTRAVENOUS CONTINUOUS
Status: DISCONTINUED | OUTPATIENT
Start: 2021-09-09 | End: 2021-09-09

## 2021-09-09 RX ORDER — ONDANSETRON 2 MG/ML
4 INJECTION INTRAMUSCULAR; INTRAVENOUS ONCE AS NEEDED
Status: DISCONTINUED | OUTPATIENT
Start: 2021-09-09 | End: 2021-09-09

## 2021-09-09 RX ORDER — GLYCOPYRROLATE 0.2 MG/ML
INJECTION, SOLUTION INTRAMUSCULAR; INTRAVENOUS AS NEEDED
Status: DISCONTINUED | OUTPATIENT
Start: 2021-09-09 | End: 2021-09-09 | Stop reason: SURG

## 2021-09-09 RX ORDER — HYDROCODONE BITARTRATE AND ACETAMINOPHEN 5; 325 MG/1; MG/1
1-2 TABLET ORAL EVERY 6 HOURS PRN
Qty: 20 TABLET | Refills: 0 | Status: SHIPPED | OUTPATIENT
Start: 2021-09-09 | End: 2021-09-14 | Stop reason: ALTCHOICE

## 2021-09-09 RX ORDER — BUPIVACAINE HYDROCHLORIDE 5 MG/ML
INJECTION, SOLUTION EPIDURAL; INTRACAUDAL AS NEEDED
Status: DISCONTINUED | OUTPATIENT
Start: 2021-09-09 | End: 2021-09-09 | Stop reason: HOSPADM

## 2021-09-09 RX ORDER — MORPHINE SULFATE 10 MG/ML
6 INJECTION, SOLUTION INTRAMUSCULAR; INTRAVENOUS EVERY 10 MIN PRN
Status: DISCONTINUED | OUTPATIENT
Start: 2021-09-09 | End: 2021-09-09

## 2021-09-09 RX ORDER — EPHEDRINE SULFATE 50 MG/ML
INJECTION, SOLUTION INTRAVENOUS AS NEEDED
Status: DISCONTINUED | OUTPATIENT
Start: 2021-09-09 | End: 2021-09-09 | Stop reason: SURG

## 2021-09-09 RX ORDER — DEXAMETHASONE SODIUM PHOSPHATE 4 MG/ML
VIAL (ML) INJECTION AS NEEDED
Status: DISCONTINUED | OUTPATIENT
Start: 2021-09-09 | End: 2021-09-09 | Stop reason: SURG

## 2021-09-09 RX ORDER — HYDROCODONE BITARTRATE AND ACETAMINOPHEN 5; 325 MG/1; MG/1
2 TABLET ORAL AS NEEDED
Status: DISCONTINUED | OUTPATIENT
Start: 2021-09-09 | End: 2021-09-09

## 2021-09-09 RX ORDER — MORPHINE SULFATE 4 MG/ML
2 INJECTION, SOLUTION INTRAMUSCULAR; INTRAVENOUS EVERY 10 MIN PRN
Status: DISCONTINUED | OUTPATIENT
Start: 2021-09-09 | End: 2021-09-09

## 2021-09-09 RX ADMIN — GLYCOPYRROLATE 0.1 MG: 0.2 INJECTION, SOLUTION INTRAMUSCULAR; INTRAVENOUS at 12:25:00

## 2021-09-09 RX ADMIN — CEFAZOLIN SODIUM/WATER 2 G: 2 G/20 ML SYRINGE (ML) INTRAVENOUS at 12:18:00

## 2021-09-09 RX ADMIN — ONDANSETRON 4 MG: 2 INJECTION INTRAMUSCULAR; INTRAVENOUS at 13:05:00

## 2021-09-09 RX ADMIN — SODIUM CHLORIDE, SODIUM LACTATE, POTASSIUM CHLORIDE, CALCIUM CHLORIDE: 600; 310; 30; 20 INJECTION, SOLUTION INTRAVENOUS at 13:20:00

## 2021-09-09 RX ADMIN — DEXAMETHASONE SODIUM PHOSPHATE 8 MG: 4 MG/ML VIAL (ML) INJECTION at 12:27:00

## 2021-09-09 RX ADMIN — PHENYLEPHRINE HCL 50 MCG: 10 MG/ML VIAL (ML) INJECTION at 12:22:00

## 2021-09-09 RX ADMIN — MIDAZOLAM HYDROCHLORIDE 2 MG: 1 INJECTION INTRAMUSCULAR; INTRAVENOUS at 12:11:00

## 2021-09-09 RX ADMIN — EPHEDRINE SULFATE 10 MG: 50 INJECTION, SOLUTION INTRAVENOUS at 12:18:00

## 2021-09-09 RX ADMIN — EPHEDRINE SULFATE 10 MG: 50 INJECTION, SOLUTION INTRAVENOUS at 13:07:00

## 2021-09-09 RX ADMIN — EPHEDRINE SULFATE 10 MG: 50 INJECTION, SOLUTION INTRAVENOUS at 12:51:00

## 2021-09-09 NOTE — BRIEF OP NOTE
Pre-Operative Diagnosis: Invasive ductal carcinoma of breast, female, left ([facility] [address] [C50.912]     Post-Operative Diagnosis: Invasive ductal carcinoma of breast, female, left [facility]) [C50.912]      Procedure Performed:   Left breast wire localized lumpectomy, le

## 2021-09-09 NOTE — H&P
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     Stage: N/A     Date of Surgery: July 2015     Status and Adjuvant Therapy:  Radiation Therapy:  N/A  Chemotherapy: recommendations regarding further management.           Past Medical History:   Diagnosis Date   • Amenorrhea     • Anemia       as a child   • Anesthesia complication       too much epidural with 1st  was paralyzed from jaw done   • Atypical ducta Lipids Mother     • Other Praneeth Herb Mother         hypothyroid   • Cancer Sister         breast   • Breast Cancer Sister 40-genetic testing negative       two times @46 y/o   • Heart Disorder Brother     • Hypertension Brother     She is not of Ashkenazi Sikh discharge.     Skin:    The patient denies rash, itching, skin lesions, dry skin, change in skin color or change in moles.      Hematologic/Lymphatic:  The patient denies easily bruising or bleeding or persistent swollen glands or lymph nodes.      Musculo is: 5.7%  Laurie-Alyssa Lifetime is: 38.9%     Probability of BRCA 1 or 2 alteration is:  0.2% using BRCA PRO, and 0.3% using Tyrer-Cuzick, and 1.5% using Myriad Prevalence Tables.     Impression:   Ms. Suzie Rojas is a 64year old woman s/p Left pete cancer genetic program here at [facility].      We then talked about surveillance and I recommended semiannual breast exams as well as continuing with annual mammography.   At this point, I recommend annual screening breast MRI, as Gaby Figueredo h significant changes have occurred in the patient's condition since the H&P was performed.   I discussed with the patient and/or legal representative the potential benefits, risks and side effects of this procedure; the likelihood of the patient achieving go

## 2021-09-09 NOTE — IMAGING NOTE
0746 Pt  to ultrasound department scouts completed by Amy Jamison, mammography technologist     6429 Hx taken procedure explained questions answered       2989 Consent signed and verified prior to [facility] arrival.     9814 Dr Fransisca Donald here scanning completed Orde normal...

## 2021-09-09 NOTE — ANESTHESIA PREPROCEDURE EVALUATION
Anesthesia PreOp Note    HPI:     Maxime Fleming is a 64year old female who presents for preoperative consultation requested by: Talon Van MD    Date of Surgery: 9/9/2021    Procedure(s):  Left breast wire localized lumpectomy, left lymphosci TABLET BY MOUTH EVERY DAY, Disp: 90 tablet, Rfl: 3, 9/8/2021 at 0700  omega-3 fatty acids 1000 MG Oral Cap, Take 1,000 mg by mouth daily. , Disp: , Rfl: , 9/2/2021  SUMAtriptan Succinate 100 MG Oral Tab, TAKE 1 TABLET BY MOUTH AS NEEDED FOR MIGRAINE, Disp: Service: Not Asked        Blood Transfusions: Not Asked        Occupational Exposure: Not Asked        Hobby Hazards: Not Asked        Sleep Concern: Not Asked        Back Care: Not Asked        Bike Helmet: Not Asked        Self-Exams: Not Asked    Social 09/02/21  1630 09/09/21  0647 09/09/21  1010   BP:  119/55 116/54   Pulse:  74 61   Resp:  16 16   Temp:  98.3 °F (36.8 °C) 98 °F (36.7 °C)   TempSrc:  Oral Oral   SpO2:  98% 99%   Weight: 74.8 kg (165 lb) 75.3 kg (166 lb)    Height: 1.702 m (5' 7\") 1.702

## 2021-09-09 NOTE — ANESTHESIA POSTPROCEDURE EVALUATION
Patient: Mehul Kim    Procedure Summary     Date: 09/09/21 Room / Location: [address] [place] [address] MAIN OR 08 / [address] [place] [address] OR    Anesthesia Start: 2074 Anesthesia Stop:     Procedure: Left breast wire localized lumpectomy, left lymphoscintigraphy, left sentinel lymph

## 2021-09-09 NOTE — ANESTHESIA PROCEDURE NOTES
Airway  Date/Time: 9/9/2021 12:17 PM  Urgency: Elective    Airway not difficult    General Information and Staff    Patient location during procedure: OR  Anesthesiologist: Denton Lennon MD  Performed: anesthesiologist     Indications and Patien

## 2021-09-10 NOTE — OPERATIVE REPORT
[facility]    PATIENT'S NAME: Blanca Pump   ATTENDING PHYSICIAN: Jonna Renteria. Jose Angel Betancur MD   OPERATING PHYSICIAN: Jonna Renteria.  Jose Angel Betancur MD   PATIENT ACCOUNT#:   [id number]    LOCATION:  [place] [address]  MEDICAL RECORD #:   [id number] as well as lymphoscintigraphy for sentinel lymph node identification preoperatively in the nuclear medicine department. She was brought to the OR, placed in supine position. She was properly padded and secured. She was given a dose of IV antibiotics.   Cody Mcneal carefully oriented with a short stitch, single clip superiorly and long stitch, double clip laterally, placed in the imaging device where specimen x-ray confirmed the presence of targeted clip residual lesion with adequate margins as deemed by myself.   Shawna

## 2021-09-12 NOTE — PROGRESS NOTES
[facility] Consultation Note      Patient Name: Ifeoma Gomez   YOB: 1960  Medical Record Number: [id number]  Consulting Physician: Vanessa Howell M.D. Referring Physician: Tisha Huber M.D.     Date of Cons headaches; shingles. Past Surgical History   Left breast lumpectomy with sentinel lymph node biopsy (as above);  x 2; diagnostic laparoscopy. Family History   Ms. Barboza Cancer has an adult son and adult daughter.      Ms. Barboza Cancer has one older Position: Sitting, Cuff Size: adult)   Pulse 90   Temp 98.2 °F (36.8 °C) (Tympanic)   Resp 16   Ht 1.702 m (5' 7.01\")   Wt 74.8 kg (164 lb 12.8 oz)   LMP  (LMP Unknown)   SpO2 96%   BMI 25.81 kg/m²     Physical Examination   Constitutional Well developed 6/15/2017, 10:11 AM.  IMAKMS , MR, MRI BREAST (W+WO) W/CAD BILAT (CPT=77049), 6/15/2020, 7:00   PM.  CKMEUS , MR, MRI BREAST (W+WO) W/CAD BILAT (CPT=77049), 6/05/2019, 10:56 AM.       INDICATIONS:  N60.99 Atypical ductal hyperplasia of breast Z91.89 At hig and plateau delayed phase enhancement kinetics (image 183 of the axial subtraction images).  Other   scattered nonspecific, non dominant areas of enhancement in the left breast are most consistent with background parenchymal enhancement. Lety Ashley is no axill tissue. · Specimen, including inked true surgical resection margin, is negative for malignancy.     D. Left breast, medial margin; excision:  · Benign breast tissue. · Specimen, including inked true surgical resection margin, is negative for malignancy. discussed. Otherwise, adjuvant systemic therapy will be with an aromatase inhibitor. As patient's malignancy was seen on MRI, she should continue screening with MRI.     2.   Skeletal health: Vitamin D level drawn today is normal. I recommend supp

## 2021-09-13 ENCOUNTER — NURSE NAVIGATOR ENCOUNTER (OUTPATIENT)
Dept: HEMATOLOGY/ONCOLOGY | Facility: HOSPITAL | Age: 61
End: 2021-09-13

## 2021-09-13 NOTE — PROGRESS NOTES
LVM for patient regarding pathology report. Margins negative, as well as lymph nodes, which Dr. Evelio Draper did message her regarding.  Let patient know that the laboratory is re-running the Her 2 by [facility], this will likely not be resulted until Friday (this was

## 2021-09-13 NOTE — PROGRESS NOTES
Spoke with patient regarding pathology and plan. We discussed that Her2 is pending by [facility] and likely will not result until Friday. If this comes back positive, Dr. Eve Garcia would need to discuss chemotherapy with her.  If it is negative, as it was on biopsy

## 2021-09-14 ENCOUNTER — OFFICE VISIT (OUTPATIENT)
Dept: HEMATOLOGY/ONCOLOGY | Facility: HOSPITAL | Age: 61
End: 2021-09-14
Attending: GENETIC COUNSELOR, MS
Payer: COMMERCIAL

## 2021-09-14 ENCOUNTER — NURSE NAVIGATOR ENCOUNTER (OUTPATIENT)
Dept: HEMATOLOGY/ONCOLOGY | Facility: HOSPITAL | Age: 61
End: 2021-09-14

## 2021-09-14 ENCOUNTER — OFFICE VISIT (OUTPATIENT)
Dept: SURGERY | Facility: CLINIC | Age: 61
End: 2021-09-14
Payer: COMMERCIAL

## 2021-09-14 VITALS
DIASTOLIC BLOOD PRESSURE: 62 MMHG | HEIGHT: 67.01 IN | RESPIRATION RATE: 16 BRPM | BODY MASS INDEX: 25.87 KG/M2 | HEART RATE: 90 BPM | OXYGEN SATURATION: 96 % | SYSTOLIC BLOOD PRESSURE: 112 MMHG | WEIGHT: 164.81 LBS

## 2021-09-14 VITALS
BODY MASS INDEX: 25.87 KG/M2 | OXYGEN SATURATION: 96 % | HEIGHT: 67.01 IN | RESPIRATION RATE: 16 BRPM | HEART RATE: 90 BPM | DIASTOLIC BLOOD PRESSURE: 62 MMHG | SYSTOLIC BLOOD PRESSURE: 112 MMHG | TEMPERATURE: 98 F | WEIGHT: 164.81 LBS

## 2021-09-14 DIAGNOSIS — Z79.811 LONG TERM (CURRENT) USE OF AROMATASE INHIBITORS: ICD-10-CM

## 2021-09-14 DIAGNOSIS — Z17.0 MALIGNANT NEOPLASM OF LOWER-OUTER QUADRANT OF LEFT BREAST OF FEMALE, ESTROGEN RECEPTOR POSITIVE (HCC): Primary | ICD-10-CM

## 2021-09-14 DIAGNOSIS — C50.512 MALIGNANT NEOPLASM OF LOWER-OUTER QUADRANT OF LEFT BREAST OF FEMALE, ESTROGEN RECEPTOR POSITIVE (HCC): Primary | ICD-10-CM

## 2021-09-14 DIAGNOSIS — C50.912 INVASIVE DUCTAL CARCINOMA OF BREAST, FEMALE, LEFT (HCC): ICD-10-CM

## 2021-09-14 DIAGNOSIS — Z78.0 POST-MENOPAUSAL: Primary | ICD-10-CM

## 2021-09-14 LAB — VIT D+METAB SERPL-MCNC: 37.1 NG/ML (ref 30–100)

## 2021-09-14 PROCEDURE — 3074F SYST BP LT 130 MM HG: CPT | Performed by: SPECIALIST

## 2021-09-14 PROCEDURE — 3078F DIAST BP <80 MM HG: CPT | Performed by: SURGERY

## 2021-09-14 PROCEDURE — 3008F BODY MASS INDEX DOCD: CPT | Performed by: SPECIALIST

## 2021-09-14 PROCEDURE — 3074F SYST BP LT 130 MM HG: CPT | Performed by: SURGERY

## 2021-09-14 PROCEDURE — 3078F DIAST BP <80 MM HG: CPT | Performed by: SPECIALIST

## 2021-09-14 PROCEDURE — 99024 POSTOP FOLLOW-UP VISIT: CPT | Performed by: SURGERY

## 2021-09-14 PROCEDURE — 3008F BODY MASS INDEX DOCD: CPT | Performed by: SURGERY

## 2021-09-14 PROCEDURE — 99245 OFF/OP CONSLTJ NEW/EST HI 55: CPT | Performed by: SPECIALIST

## 2021-09-14 NOTE — PROGRESS NOTES
Met with patient following appointment with Dr. Dieter Giles. Pt is, understandably, tearful. Pt's HER2 by FISH is pending. If this is negative, we will order oncotype testing. If Her 2 is positive, will call to discuss plan.  Pt is meeting with Dr. Simon Lugo this

## 2021-09-14 NOTE — PROGRESS NOTES
Patient is here today for follow up with Arsalan Pryor for Invasive Ductal Carcinoma. Pathology results. Patient denies pain. Medication list and medical history were reviewed and updated.      Education Record    Learner:  Patient    Disease / Diagnosis: Marie

## 2021-09-16 ENCOUNTER — NURSE NAVIGATOR ENCOUNTER (OUTPATIENT)
Dept: HEMATOLOGY/ONCOLOGY | Facility: HOSPITAL | Age: 61
End: 2021-09-16

## 2021-09-16 NOTE — PROGRESS NOTES
Phoned patient, Her 2 result is negative. Will order oncotype. Will assist patient in scheduling appointment with radiation oncology in 2 weeks. Oncotype DX test ordered online per Dr. Orin Delgado.

## 2021-09-22 ENCOUNTER — HOSPITAL ENCOUNTER (OUTPATIENT)
Dept: BONE DENSITY | Age: 61
Discharge: HOME | End: 2021-09-22
Attending: SPECIALIST
Payer: COMMERCIAL

## 2021-09-22 DIAGNOSIS — Z78.0 POST-MENOPAUSAL: ICD-10-CM

## 2021-09-22 DIAGNOSIS — Z79.811 LONG TERM (CURRENT) USE OF AROMATASE INHIBITORS: ICD-10-CM

## 2021-09-22 PROCEDURE — 77080 DXA BONE DENSITY AXIAL: CPT | Performed by: SPECIALIST

## 2021-09-29 ENCOUNTER — NURSE NAVIGATOR ENCOUNTER (OUTPATIENT)
Dept: HEMATOLOGY/ONCOLOGY | Facility: HOSPITAL | Age: 61
End: 2021-09-29

## 2021-09-29 NOTE — PROGRESS NOTES
Attempted to call patient regarding her Oncotype score of 19 today, but her voicemail box was full and was not able to leave her a voicemail to call back. Will attempt to call patient again regarding her Oncotype score.

## 2021-09-30 ENCOUNTER — NURSE NAVIGATOR ENCOUNTER (OUTPATIENT)
Dept: HEMATOLOGY/ONCOLOGY | Facility: HOSPITAL | Age: 61
End: 2021-09-30

## 2021-09-30 NOTE — PROGRESS NOTES
Called patient and confirmed full name and  and gave patient her Oncotype score of 19. Per Dr. Jose A Mccarthy, no chemotherapy is recommended and she will proceed with radiation, start AI after radiation and follow-up with him afterwards.  Patient stated she wa

## 2021-10-01 NOTE — PROGRESS NOTES
Breast Surgery Post-Operative Visit    Diagnosis: Left breast cancer status post lumpectomy and sentinel lymph node biopsy on September 9, 2021. 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 She elected for breast conserving therapy which took place last week without complication.   She denies any fevers, chills, erythem UNITS Oral Cap, Take 1 tablet by mouth daily. , Disp: , Rfl:   MULTI-VITAMIN OR, daily, Disp: , Rfl:     No current facility-administered medications on file prior to visit.       Allergies:      Trimethoprim            RASH     Family History   Problem Rela habits, diarrhea, abdominal pain or vomiting blood.      Genitourinary:  The patient denies frequent urination, needing to get up at night to urinate, urinary hesitancy or retaining urine, painful urination, urinary incontinence, decreased urine stream, blo changes on either side. The abdomen is soft, flat and nontender, with no palpable masses or organomegaly.     Impression:   Ms. Rush Melara is a 63year old woman s/p Left breast excisional biopsy for left breast ADH/FEA as well as a family history o

## 2021-10-05 ENCOUNTER — OFFICE VISIT (OUTPATIENT)
Dept: SURGERY | Facility: CLINIC | Age: 61
End: 2021-10-05
Payer: COMMERCIAL

## 2021-10-05 ENCOUNTER — HOSPITAL ENCOUNTER (OUTPATIENT)
Dept: RADIATION ONCOLOGY | Facility: HOSPITAL | Age: 61
Discharge: HOME | End: 2021-10-05
Attending: INTERNAL MEDICINE
Payer: COMMERCIAL

## 2021-10-05 VITALS
HEIGHT: 67.01 IN | WEIGHT: 164.81 LBS | OXYGEN SATURATION: 99 % | SYSTOLIC BLOOD PRESSURE: 131 MMHG | TEMPERATURE: 98 F | BODY MASS INDEX: 25.87 KG/M2 | DIASTOLIC BLOOD PRESSURE: 72 MMHG | RESPIRATION RATE: 16 BRPM | HEART RATE: 71 BPM

## 2021-10-05 VITALS
DIASTOLIC BLOOD PRESSURE: 80 MMHG | HEIGHT: 67 IN | BODY MASS INDEX: 25.93 KG/M2 | TEMPERATURE: 98 F | OXYGEN SATURATION: 99 % | WEIGHT: 165.19 LBS | HEART RATE: 71 BPM | SYSTOLIC BLOOD PRESSURE: 135 MMHG | RESPIRATION RATE: 16 BRPM

## 2021-10-05 DIAGNOSIS — Z17.0 MALIGNANT NEOPLASM OF LOWER-OUTER QUADRANT OF LEFT BREAST OF FEMALE, ESTROGEN RECEPTOR POSITIVE (HCC): Primary | ICD-10-CM

## 2021-10-05 DIAGNOSIS — Z80.3 FAMILY HISTORY OF BREAST CANCER IN SISTER: ICD-10-CM

## 2021-10-05 DIAGNOSIS — C50.512 MALIGNANT NEOPLASM OF LOWER-OUTER QUADRANT OF LEFT BREAST OF FEMALE, ESTROGEN RECEPTOR POSITIVE (HCC): Primary | ICD-10-CM

## 2021-10-05 DIAGNOSIS — C50.912 INVASIVE DUCTAL CARCINOMA OF BREAST, FEMALE, LEFT (HCC): ICD-10-CM

## 2021-10-05 PROCEDURE — 3078F DIAST BP <80 MM HG: CPT | Performed by: SURGERY

## 2021-10-05 PROCEDURE — 3075F SYST BP GE 130 - 139MM HG: CPT | Performed by: SURGERY

## 2021-10-05 PROCEDURE — 99214 OFFICE O/P EST MOD 30 MIN: CPT

## 2021-10-05 PROCEDURE — 3008F BODY MASS INDEX DOCD: CPT | Performed by: SURGERY

## 2021-10-05 PROCEDURE — 99024 POSTOP FOLLOW-UP VISIT: CPT | Performed by: SURGERY

## 2021-10-05 RX ORDER — MOMETASONE FUROATE 1 MG/G
CREAM TOPICAL
Qty: 50 G | Refills: 3 | Status: SHIPPED | OUTPATIENT
Start: 2021-10-05 | End: 2021-12-06 | Stop reason: ALTCHOICE

## 2021-10-05 NOTE — CONSULTS
[facility] Consultation      Patient Name: Sarah Erickson   MRN: [id number]  PCP: Doyle Parr MD  Referring Physician: Eulalia Crane MD; Katelynn Dee MD    Diagnosis Site: left breast  Stage: pT1b (0.7 cm) N0 ( EVERY DAY 90 tablet 3   • omega-3 fatty acids 1000 MG Oral Cap Take 1,000 mg by mouth daily. • Cholecalciferol (VITAMIN D3) 1000 UNITS Oral Cap Take 1 tablet by mouth daily.      • MULTI-VITAMIN OR daily     • SUMAtriptan Succinate 100 MG Oral Tab TAKE the time of consultation. I recommended a course of adjuvant external beam radiotherapy to the left breast using a hypofractionated approach over 4 weeks. DIBH will likely be utilized for cardiac and pulmonary sparing.      The purpose of radiotherapy is to

## 2021-10-05 NOTE — PATIENT INSTRUCTIONS
- WE WILL CALL TO SCHEDULE YOUR CT SIMULATION FOR RADIATION PLANNING.    - AN ORDER FOR MOMETASONE CREAM WILL BE CALLED INTO YOUR PHARMACY.  PLEASE START APPLICATION AFTER YOUR FIRST RADIATION TREATMENT.     - IF YOU HAVE ANY QUESTIONS OR CONCERNS REGARDING

## 2021-10-05 NOTE — PROGRESS NOTES
Nursing Consultation Note  Patient: Teddy Abbott  YOB: 1960  Age: 63year old  Radiation Oncologist: Dr. Tyshawn Ramirez  Referring Physician: Miryam Baeza, Dr. Kyle Santillan, Dr. Lina Caro (PCP)  Diagnosis: LEFT BREAST CANCER  Con Psychiatric/Behavioral: Negative.            Allergies:    Trimethoprim            RASH    Current Outpatient Medications   Medication Sig Dispense Refill   • ATORVASTATIN 20 MG Oral Tab TAKE 1 TABLET BY MOUTH EVERY DAY 90 tablet 3   • omega-3 fatty acids status:       Spouse name: Not on file      Number of children: 2      Years of education: Not on file      Highest education level: Not on file    Occupational History      Occupation: speech language pathologist    Tobacco Use      Smoking status on file      Marital Status: Not on file  Intimate Partner Violence:       Fear of Current or Ex-Partner: Not on file      Emotionally Abused: Not on file      Physically Abused: Not on file      Sexually Abused: Not on file  Housing Stability:       Unabl

## 2021-10-14 ENCOUNTER — HOSPITAL ENCOUNTER (OUTPATIENT)
Dept: RADIATION ONCOLOGY | Facility: HOSPITAL | Age: 61
Discharge: HOME | End: 2021-10-14
Attending: INTERNAL MEDICINE
Payer: COMMERCIAL

## 2021-10-14 PROCEDURE — 77290 THER RAD SIMULAJ FIELD CPLX: CPT | Performed by: INTERNAL MEDICINE

## 2021-10-14 PROCEDURE — 77334 RADIATION TREATMENT AID(S): CPT | Performed by: INTERNAL MEDICINE

## 2021-10-14 NOTE — PROGRESS NOTES
Breast Surgery Post-Operative Visit    Diagnosis: Left breast cancer status post lumpectomy and sentinel lymph node biopsy on September 9, 2021. 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 She was confirmed to have invasive ductal carcinoma, grade 1 associated with LCIS, %, [place] 5%, HER-2/reyna negative, Ki-67 3%. She elected for breast conserving therapy which took place without complication.   She denies daily., Disp: , Rfl:   MULTI-VITAMIN OR, daily, Disp: , Rfl:   SUMAtriptan Succinate 100 MG Oral Tab, TAKE 1 TABLET BY MOUTH AS NEEDED FOR MIGRAINE (Patient not taking: No sig reported), Disp: 9 tablet, Rfl: 5    No current facility-administered medication vomiting, dark or bloody stools, constipation, yellowing of the skin, indigestion, nausea, change in bowel habits, diarrhea, abdominal pain or vomiting blood.      Genitourinary:  The patient denies frequent urination, needing to get up at night to urinate, symmetrical. The tumorectomy site is in the lower outer left  breast and shows no evidence of erythema or fluctuance. Both nipples are everted with no discharge. There is not dominant masses, focal nodularity or skin changes on either side.  The abdomen is

## 2021-10-15 PROCEDURE — 77470 SPECIAL RADIATION TREATMENT: CPT | Performed by: INTERNAL MEDICINE

## 2021-10-20 PROCEDURE — 77295 3-D RADIOTHERAPY PLAN: CPT | Performed by: INTERNAL MEDICINE

## 2021-10-20 PROCEDURE — 77300 RADIATION THERAPY DOSE PLAN: CPT | Performed by: INTERNAL MEDICINE

## 2021-10-20 PROCEDURE — 77293 RESPIRATOR MOTION MGMT SIMUL: CPT | Performed by: INTERNAL MEDICINE

## 2021-10-20 PROCEDURE — 77334 RADIATION TREATMENT AID(S): CPT | Performed by: INTERNAL MEDICINE

## 2021-10-27 ENCOUNTER — HOSPITAL ENCOUNTER (OUTPATIENT)
Dept: RADIATION ONCOLOGY | Facility: HOSPITAL | Age: 61
Discharge: HOME | End: 2021-10-27
Attending: INTERNAL MEDICINE
Payer: COMMERCIAL

## 2021-10-27 PROCEDURE — 77412 RADIATION TX DELIVERY LVL 3: CPT | Performed by: INTERNAL MEDICINE

## 2021-10-27 PROCEDURE — 77280 THER RAD SIMULAJ FIELD SMPL: CPT | Performed by: INTERNAL MEDICINE

## 2021-10-28 PROCEDURE — 77412 RADIATION TX DELIVERY LVL 3: CPT | Performed by: INTERNAL MEDICINE

## 2021-10-28 PROCEDURE — 77387 GUIDANCE FOR RADJ TX DLVR: CPT | Performed by: INTERNAL MEDICINE

## 2021-10-29 ENCOUNTER — HOSPITAL ENCOUNTER (OUTPATIENT)
Dept: RADIATION ONCOLOGY | Facility: HOSPITAL | Age: 61
Discharge: HOME | End: 2021-10-29
Attending: INTERNAL MEDICINE
Payer: COMMERCIAL

## 2021-10-29 DIAGNOSIS — C50.912 INVASIVE DUCTAL CARCINOMA OF BREAST, FEMALE, LEFT (HCC): Primary | ICD-10-CM

## 2021-10-29 PROCEDURE — 77412 RADIATION TX DELIVERY LVL 3: CPT | Performed by: INTERNAL MEDICINE

## 2021-10-29 PROCEDURE — 77387 GUIDANCE FOR RADJ TX DLVR: CPT | Performed by: INTERNAL MEDICINE

## 2021-10-29 NOTE — PROGRESS NOTES
[facility] Radiation Treatment Management Note 1-5    Patient:  Regine Sanchez  Age:  63year old  Visit Diagnosis:    1.  Invasive ductal carcinoma of breast, female, left [facility])      Primary Rad/Onc:  Dr. Sade Rogel    Site

## 2021-11-01 ENCOUNTER — HOSPITAL ENCOUNTER (OUTPATIENT)
Dept: RADIATION ONCOLOGY | Facility: HOSPITAL | Age: 61
Discharge: HOME | End: 2021-11-01
Attending: INTERNAL MEDICINE
Payer: COMMERCIAL

## 2021-11-01 PROCEDURE — 77387 GUIDANCE FOR RADJ TX DLVR: CPT | Performed by: INTERNAL MEDICINE

## 2021-11-01 PROCEDURE — 77412 RADIATION TX DELIVERY LVL 3: CPT | Performed by: INTERNAL MEDICINE

## 2021-11-02 PROCEDURE — 77412 RADIATION TX DELIVERY LVL 3: CPT | Performed by: INTERNAL MEDICINE

## 2021-11-02 PROCEDURE — 77387 GUIDANCE FOR RADJ TX DLVR: CPT | Performed by: INTERNAL MEDICINE

## 2021-11-03 PROCEDURE — 77412 RADIATION TX DELIVERY LVL 3: CPT | Performed by: INTERNAL MEDICINE

## 2021-11-03 PROCEDURE — 77387 GUIDANCE FOR RADJ TX DLVR: CPT | Performed by: INTERNAL MEDICINE

## 2021-11-04 PROCEDURE — 77412 RADIATION TX DELIVERY LVL 3: CPT | Performed by: INTERNAL MEDICINE

## 2021-11-04 PROCEDURE — 77387 GUIDANCE FOR RADJ TX DLVR: CPT | Performed by: INTERNAL MEDICINE

## 2021-11-05 ENCOUNTER — HOSPITAL ENCOUNTER (OUTPATIENT)
Dept: RADIATION ONCOLOGY | Facility: HOSPITAL | Age: 61
Discharge: HOME | End: 2021-11-05
Attending: INTERNAL MEDICINE
Payer: COMMERCIAL

## 2021-11-05 VITALS
HEART RATE: 69 BPM | WEIGHT: 166 LBS | BODY MASS INDEX: 26 KG/M2 | RESPIRATION RATE: 20 BRPM | OXYGEN SATURATION: 99 % | TEMPERATURE: 97 F | SYSTOLIC BLOOD PRESSURE: 142 MMHG | DIASTOLIC BLOOD PRESSURE: 79 MMHG

## 2021-11-05 DIAGNOSIS — C50.912 INVASIVE DUCTAL CARCINOMA OF BREAST, FEMALE, LEFT (HCC): Primary | ICD-10-CM

## 2021-11-05 PROCEDURE — 77412 RADIATION TX DELIVERY LVL 3: CPT | Performed by: INTERNAL MEDICINE

## 2021-11-05 PROCEDURE — 77336 RADIATION PHYSICS CONSULT: CPT | Performed by: INTERNAL MEDICINE

## 2021-11-05 PROCEDURE — 77387 GUIDANCE FOR RADJ TX DLVR: CPT | Performed by: INTERNAL MEDICINE

## 2021-11-05 NOTE — PROGRESS NOTES
[facility] Radiation Treatment Management Note 6-10    Patient:  Argentina Street  Age:  63year old  Visit Diagnosis:  L breast IDC, grade 2, ER/NC+, pT1b N0  Primary Rad/Onc:  Dr. Nano Ivan    Site Delivered Dose (cGy) Prescrib MD

## 2021-11-08 PROCEDURE — 77387 GUIDANCE FOR RADJ TX DLVR: CPT | Performed by: INTERNAL MEDICINE

## 2021-11-08 PROCEDURE — 77412 RADIATION TX DELIVERY LVL 3: CPT | Performed by: INTERNAL MEDICINE

## 2021-11-09 PROCEDURE — 77331 SPECIAL RADIATION DOSIMETRY: CPT | Performed by: INTERNAL MEDICINE

## 2021-11-09 PROCEDURE — 77412 RADIATION TX DELIVERY LVL 3: CPT | Performed by: INTERNAL MEDICINE

## 2021-11-09 PROCEDURE — 77387 GUIDANCE FOR RADJ TX DLVR: CPT | Performed by: INTERNAL MEDICINE

## 2021-11-10 PROCEDURE — 77387 GUIDANCE FOR RADJ TX DLVR: CPT | Performed by: INTERNAL MEDICINE

## 2021-11-10 PROCEDURE — 77412 RADIATION TX DELIVERY LVL 3: CPT | Performed by: INTERNAL MEDICINE

## 2021-11-11 PROCEDURE — 77387 GUIDANCE FOR RADJ TX DLVR: CPT | Performed by: INTERNAL MEDICINE

## 2021-11-11 PROCEDURE — 77412 RADIATION TX DELIVERY LVL 3: CPT | Performed by: INTERNAL MEDICINE

## 2021-11-12 ENCOUNTER — HOSPITAL ENCOUNTER (OUTPATIENT)
Dept: RADIATION ONCOLOGY | Facility: HOSPITAL | Age: 61
Discharge: HOME | End: 2021-11-12
Attending: INTERNAL MEDICINE
Payer: COMMERCIAL

## 2021-11-12 VITALS
HEART RATE: 64 BPM | BODY MASS INDEX: 26 KG/M2 | OXYGEN SATURATION: 98 % | RESPIRATION RATE: 20 BRPM | TEMPERATURE: 98 F | DIASTOLIC BLOOD PRESSURE: 65 MMHG | SYSTOLIC BLOOD PRESSURE: 118 MMHG | WEIGHT: 166 LBS

## 2021-11-12 DIAGNOSIS — C50.912 INVASIVE DUCTAL CARCINOMA OF BREAST, FEMALE, LEFT (HCC): Primary | ICD-10-CM

## 2021-11-12 PROCEDURE — 77336 RADIATION PHYSICS CONSULT: CPT | Performed by: INTERNAL MEDICINE

## 2021-11-12 PROCEDURE — 77290 THER RAD SIMULAJ FIELD CPLX: CPT | Performed by: INTERNAL MEDICINE

## 2021-11-12 PROCEDURE — 77334 RADIATION TREATMENT AID(S): CPT | Performed by: INTERNAL MEDICINE

## 2021-11-12 PROCEDURE — 77412 RADIATION TX DELIVERY LVL 3: CPT | Performed by: INTERNAL MEDICINE

## 2021-11-12 PROCEDURE — 77307 TELETHX ISODOSE PLAN CPLX: CPT | Performed by: INTERNAL MEDICINE

## 2021-11-12 NOTE — PROGRESS NOTES
[facility] Radiation Treatment Management Note 11-15    Patient:  Robert Bennett  Age:  63year old  Visit Diagnosis:  L breast IDC, grade 2, ER/IA+, pT1b N0  Primary Rad/Onc:  Dr. Betty Richards    Site Delivered Dose (cGy) Prescri

## 2021-11-15 PROCEDURE — 77387 GUIDANCE FOR RADJ TX DLVR: CPT | Performed by: INTERNAL MEDICINE

## 2021-11-15 PROCEDURE — 77412 RADIATION TX DELIVERY LVL 3: CPT | Performed by: INTERNAL MEDICINE

## 2021-11-16 ENCOUNTER — HOSPITAL ENCOUNTER (OUTPATIENT)
Dept: RADIATION ONCOLOGY | Facility: HOSPITAL | Age: 61
Discharge: HOME | End: 2021-11-16
Attending: INTERNAL MEDICINE
Payer: COMMERCIAL

## 2021-11-16 PROCEDURE — 77280 THER RAD SIMULAJ FIELD SMPL: CPT | Performed by: INTERNAL MEDICINE

## 2021-11-16 PROCEDURE — 77412 RADIATION TX DELIVERY LVL 3: CPT | Performed by: INTERNAL MEDICINE

## 2021-11-17 PROCEDURE — 77412 RADIATION TX DELIVERY LVL 3: CPT | Performed by: INTERNAL MEDICINE

## 2021-11-17 PROCEDURE — 77387 GUIDANCE FOR RADJ TX DLVR: CPT | Performed by: INTERNAL MEDICINE

## 2021-11-17 RX ORDER — PANTOPRAZOLE SODIUM 40 MG/1
40 TABLET, DELAYED RELEASE ORAL DAILY
Qty: 30 TABLET | Refills: 1 | Status: SHIPPED | OUTPATIENT
Start: 2021-11-17

## 2021-11-18 PROCEDURE — 77412 RADIATION TX DELIVERY LVL 3: CPT | Performed by: INTERNAL MEDICINE

## 2021-11-18 PROCEDURE — 77387 GUIDANCE FOR RADJ TX DLVR: CPT | Performed by: INTERNAL MEDICINE

## 2021-11-19 ENCOUNTER — HOSPITAL ENCOUNTER (OUTPATIENT)
Dept: RADIATION ONCOLOGY | Facility: HOSPITAL | Age: 61
Discharge: HOME | End: 2021-11-19
Attending: INTERNAL MEDICINE
Payer: COMMERCIAL

## 2021-11-19 ENCOUNTER — NURSE NAVIGATOR ENCOUNTER (OUTPATIENT)
Dept: HEMATOLOGY/ONCOLOGY | Facility: HOSPITAL | Age: 61
End: 2021-11-19

## 2021-11-19 VITALS
TEMPERATURE: 99 F | HEART RATE: 67 BPM | BODY MASS INDEX: 26 KG/M2 | SYSTOLIC BLOOD PRESSURE: 114 MMHG | OXYGEN SATURATION: 99 % | DIASTOLIC BLOOD PRESSURE: 74 MMHG | WEIGHT: 167.38 LBS | RESPIRATION RATE: 19 BRPM

## 2021-11-19 DIAGNOSIS — C50.912 INVASIVE DUCTAL CARCINOMA OF BREAST, FEMALE, LEFT (HCC): Primary | ICD-10-CM

## 2021-11-19 PROCEDURE — 77387 GUIDANCE FOR RADJ TX DLVR: CPT | Performed by: INTERNAL MEDICINE

## 2021-11-19 PROCEDURE — 77412 RADIATION TX DELIVERY LVL 3: CPT | Performed by: INTERNAL MEDICINE

## 2021-11-19 PROCEDURE — 77336 RADIATION PHYSICS CONSULT: CPT | Performed by: INTERNAL MEDICINE

## 2021-11-19 NOTE — PROGRESS NOTES
LMOVMTCB in regards to her last radiation treatment today and to set up a follow-up appointment with Dr. Katelyn Livingston. Awaiting phone call back from patient.

## 2021-11-19 NOTE — PATIENT INSTRUCTIONS
POST-RADIATION INSTRUCTIONS:   - FOLLOW-UP WITH DR. PADRON IN ONE MONTH   - FOLLOW-UP WITH DR. Daisy Garza   - SIDE EFFECTS OF RADIATION WILL GRADUALLY SUBSIDE, IT MAY TAKE UP TO 2 WEEKS POST-RADIATION FOR YOU TO NOTICE CHANGES; SUCH AS A DECREASE IN YOUR FATI

## 2021-11-19 NOTE — PROGRESS NOTES
[facility] Radiation Treatment Management Note 16-20    Patient:  Curry James  Age:  63year old  Visit Diagnosis:  L breast IDC, grade 2, ER/NH+, pT1b N0  Primary Rad/Onc:  Dr. Santiago Human    Site Delivered Dose (cGy) Prescri

## 2021-11-22 ENCOUNTER — NURSE NAVIGATOR ENCOUNTER (OUTPATIENT)
Dept: HEMATOLOGY/ONCOLOGY | Facility: HOSPITAL | Age: 61
End: 2021-11-22

## 2021-11-22 PROCEDURE — 77412 RADIATION TX DELIVERY LVL 3: CPT | Performed by: INTERNAL MEDICINE

## 2021-11-22 PROCEDURE — 77387 GUIDANCE FOR RADJ TX DLVR: CPT | Performed by: INTERNAL MEDICINE

## 2021-11-22 NOTE — PROGRESS NOTES
Patient returned phone call from last Friday from me, breast nurse navigator, in regards to scheduling a follow-up appointment with Dr. Kamari Tello after finishing her RT treatments for breast cancer.  Patient asked when did she need to follow up with Dr. Emmett Ortega

## 2021-11-23 ENCOUNTER — DOCUMENTATION ONLY (OUTPATIENT)
Dept: RADIATION ONCOLOGY | Facility: HOSPITAL | Age: 61
End: 2021-11-23

## 2021-11-23 PROCEDURE — 77387 GUIDANCE FOR RADJ TX DLVR: CPT | Performed by: INTERNAL MEDICINE

## 2021-11-23 PROCEDURE — 77412 RADIATION TX DELIVERY LVL 3: CPT | Performed by: INTERNAL MEDICINE

## 2021-12-05 NOTE — PROGRESS NOTES
THE [facility] [place] [place] Hematology Oncology Group Progress Note      Patient Name: Dawn Beaulieu   YOB: 1960  Medical Record Number: [id number]  Attending Physician: Timothy Shah M.D.      Date of Visit: 12/6/2021       Chief Complaint  Invasive ducta complaints. Past Medical History (historical data, reviewed by physician)  Invasive ductal carcinoma, left breast (as above); hyperlipidemia, migraine headaches; shingles.      Past Surgical History (historical data, reviewed by physician)  Left breast l Rfl: SUMAtriptan Succinate 100 MG Oral Tab, TAKE 1 TABLET BY MOUTH AS NEEDED FOR MIGRAINE, Disp: 9 tablet, Rfl: 5  Cholecalciferol (VITAMIN D3) 1000 UNITS Oral Cap, Take 1 tablet by mouth daily. , Disp: , Rfl:   MULTI-VITAMIN OR, daily, Disp: , Rfl: % age matched controls:  100       Change from prior hip examination:  N/A%               ADDITIONAL FINDINGS:         10 year fracture risk for major osteoporotic fractures at 7.1 %.     10 year fracture risk for hip fracture is 0.5 %.               either start when she returns for follow up in 02/2022 or during the summer. Planned Follow Up   Patient will return for follow up at the beginning of 02/2022, one month after starting anastrazole.     Encounter Time  Pre-charting/reviewing medical katherine

## 2021-12-06 ENCOUNTER — OFFICE VISIT (OUTPATIENT)
Dept: HEMATOLOGY/ONCOLOGY | Facility: HOSPITAL | Age: 61
End: 2021-12-06
Attending: GENETIC COUNSELOR, MS
Payer: COMMERCIAL

## 2021-12-06 VITALS
RESPIRATION RATE: 16 BRPM | WEIGHT: 169 LBS | TEMPERATURE: 97 F | HEART RATE: 66 BPM | SYSTOLIC BLOOD PRESSURE: 136 MMHG | OXYGEN SATURATION: 98 % | BODY MASS INDEX: 26 KG/M2 | DIASTOLIC BLOOD PRESSURE: 86 MMHG

## 2021-12-06 DIAGNOSIS — M85.80 OSTEOPENIA AFTER MENOPAUSE: Primary | ICD-10-CM

## 2021-12-06 DIAGNOSIS — C50.912 INVASIVE DUCTAL CARCINOMA OF BREAST, FEMALE, LEFT (HCC): ICD-10-CM

## 2021-12-06 DIAGNOSIS — Z78.0 OSTEOPENIA AFTER MENOPAUSE: Primary | ICD-10-CM

## 2021-12-06 DIAGNOSIS — Z79.811 AROMATASE INHIBITOR USE: ICD-10-CM

## 2021-12-06 PROCEDURE — 99215 OFFICE O/P EST HI 40 MIN: CPT | Performed by: SPECIALIST

## 2021-12-06 NOTE — PROGRESS NOTES
Patient is here today for follow up with Silvina Matute for Breast Cancer. Patient completed Radiation therapy on 11/23/2021. Patient denies pain. Medication list and medical history were reviewed and updated.      Education Record    Learner:  Patient     Dise

## 2021-12-07 ENCOUNTER — OFFICE VISIT (OUTPATIENT)
Dept: OBGYN CLINIC | Facility: CLINIC | Age: 61
End: 2021-12-07
Payer: COMMERCIAL

## 2021-12-07 VITALS
DIASTOLIC BLOOD PRESSURE: 76 MMHG | HEART RATE: 64 BPM | SYSTOLIC BLOOD PRESSURE: 124 MMHG | WEIGHT: 168.81 LBS | HEIGHT: 66.5 IN | BODY MASS INDEX: 26.81 KG/M2

## 2021-12-07 DIAGNOSIS — Z01.419 WELL FEMALE EXAM WITH ROUTINE GYNECOLOGICAL EXAM: Primary | ICD-10-CM

## 2021-12-07 DIAGNOSIS — Z12.4 SCREENING FOR MALIGNANT NEOPLASM OF CERVIX: ICD-10-CM

## 2021-12-07 PROCEDURE — 3074F SYST BP LT 130 MM HG: CPT | Performed by: OBSTETRICS & GYNECOLOGY

## 2021-12-07 PROCEDURE — 88175 CYTOPATH C/V AUTO FLUID REDO: CPT | Performed by: OBSTETRICS & GYNECOLOGY

## 2021-12-07 PROCEDURE — 3078F DIAST BP <80 MM HG: CPT | Performed by: OBSTETRICS & GYNECOLOGY

## 2021-12-07 PROCEDURE — 3008F BODY MASS INDEX DOCD: CPT | Performed by: OBSTETRICS & GYNECOLOGY

## 2021-12-07 PROCEDURE — 99396 PREV VISIT EST AGE 40-64: CPT | Performed by: OBSTETRICS & GYNECOLOGY

## 2021-12-07 NOTE — PROGRESS NOTES
Annual  No C/O  Diagnosed with breast cancer 08/21, stage I  Lumpectomy, negative sentinel node, radiation and hormonal RX  Genetic panel testing negative  Kids are well    ROS: No Cardiac, Respiratory, GI,  or Neurological symptoms.     PE:  GENERAL: wel

## 2021-12-20 NOTE — PROGRESS NOTES
Radiation Oncology Treatment Summary    Diagnosis: left breast invasive ductal carcinoma, grade 2, ER/NE+, HER2-, Ki-67 low, pT1b (0.7 cm) N0 (0/2) cM0, overall stage IA    Site:   1. Left breast  2.   Left breast boost    Dose:   1.  4005 cGy in 15 fracti

## 2021-12-21 ENCOUNTER — APPOINTMENT (OUTPATIENT)
Dept: RADIATION ONCOLOGY | Facility: HOSPITAL | Age: 61
End: 2021-12-21
Attending: INTERNAL MEDICINE
Payer: COMMERCIAL

## 2021-12-28 ENCOUNTER — APPOINTMENT (OUTPATIENT)
Dept: RADIATION ONCOLOGY | Facility: HOSPITAL | Age: 61
End: 2021-12-28
Attending: INTERNAL MEDICINE
Payer: COMMERCIAL

## 2021-12-29 RX ORDER — ANASTROZOLE 1 MG/1
1 TABLET ORAL DAILY
Qty: 30 TABLET | Refills: 0 | Status: SHIPPED | OUTPATIENT
Start: 2021-12-29 | End: 2022-01-26

## 2022-01-14 ENCOUNTER — HOSPITAL ENCOUNTER (OUTPATIENT)
Dept: RADIATION ONCOLOGY | Facility: HOSPITAL | Age: 62
Discharge: HOME | End: 2022-01-14
Attending: INTERNAL MEDICINE
Payer: COMMERCIAL

## 2022-01-14 VITALS
RESPIRATION RATE: 18 BRPM | TEMPERATURE: 98 F | WEIGHT: 169.19 LBS | BODY MASS INDEX: 27 KG/M2 | DIASTOLIC BLOOD PRESSURE: 57 MMHG | SYSTOLIC BLOOD PRESSURE: 118 MMHG | HEART RATE: 66 BPM | OXYGEN SATURATION: 98 %

## 2022-01-14 DIAGNOSIS — C50.912 INVASIVE DUCTAL CARCINOMA OF BREAST, FEMALE, LEFT (HCC): Primary | ICD-10-CM

## 2022-01-14 PROCEDURE — 99213 OFFICE O/P EST LOW 20 MIN: CPT

## 2022-01-14 NOTE — PROGRESS NOTES
Nursing Follow-Up Note    Patient: Renny Baez  YOB: 1960  Age: 63year old  Radiation Oncologist: Dr. Turner Jamil  Referring Physician: No ref. provider found  Chief Complaint: Patient presents with:   Follow - Up    Date: 1/14/20

## 2022-01-14 NOTE — PATIENT INSTRUCTIONS
- FOLLOW-UP WITH DR. PADRON IN November 2022. OUR  WILL CALL YOU TO SCHEDULE.   - FOLLOW-UP WITH DR. Roberta VENTURA  - CALL [phone] TO SCHEDULE YOUR MAMMOGRAM IN April 2022  - CALL [phone] IF YOU HAVE ANY QUESTIONS/CONCERNS R

## 2022-01-24 NOTE — PROGRESS NOTES
[facility]  Radiation Oncology Follow-up    Patient Name: Addy Chavira   MRN: [id number]  Referring Physician: Kayleen Obrien MD; Adal Lopez MD    Diagnosis: left breast invasive ductal carcinoma, grade 2, ER/DE+, HER2-, Ki-67 low, grossly intact     -Left breast: normal contours, mild residual hyperpigmentation and edema, no masses, palpable scar tissue at lumpectomy site. Left axilla without edema, masses, or LAD. Assessment: 65yo F with imaging detected L breast cancer.  She

## 2022-01-26 RX ORDER — ANASTROZOLE 1 MG/1
TABLET ORAL
Qty: 30 TABLET | Refills: 0 | Status: SHIPPED | OUTPATIENT
Start: 2022-01-26 | End: 2022-01-27

## 2022-01-27 RX ORDER — ANASTROZOLE 1 MG/1
1 TABLET ORAL DAILY
Qty: 90 TABLET | Refills: 1 | Status: SHIPPED | OUTPATIENT
Start: 2022-01-27

## 2022-02-04 ENCOUNTER — TELEPHONE (OUTPATIENT)
Dept: HEMATOLOGY/ONCOLOGY | Facility: HOSPITAL | Age: 62
End: 2022-02-04

## 2022-02-04 NOTE — TELEPHONE ENCOUNTER
A voice message was left with detailed information re: Survivorship Visit. A call back was requested to schedule.

## 2022-02-10 ENCOUNTER — OFFICE VISIT (OUTPATIENT)
Dept: HEMATOLOGY/ONCOLOGY | Facility: HOSPITAL | Age: 62
End: 2022-02-10
Attending: GENETIC COUNSELOR, MS
Payer: COMMERCIAL

## 2022-02-10 VITALS
WEIGHT: 169.5 LBS | SYSTOLIC BLOOD PRESSURE: 112 MMHG | RESPIRATION RATE: 16 BRPM | TEMPERATURE: 98 F | DIASTOLIC BLOOD PRESSURE: 61 MMHG | HEIGHT: 67.01 IN | HEART RATE: 72 BPM | OXYGEN SATURATION: 97 % | BODY MASS INDEX: 26.6 KG/M2

## 2022-02-10 DIAGNOSIS — R23.2 HOT FLASHES RELATED TO AROMATASE INHIBITOR THERAPY: ICD-10-CM

## 2022-02-10 DIAGNOSIS — T45.1X5A HOT FLASHES RELATED TO AROMATASE INHIBITOR THERAPY: ICD-10-CM

## 2022-02-10 DIAGNOSIS — Z79.811 AROMATASE INHIBITOR USE: ICD-10-CM

## 2022-02-10 DIAGNOSIS — C50.912 INVASIVE DUCTAL CARCINOMA OF BREAST, FEMALE, LEFT (HCC): Primary | ICD-10-CM

## 2022-02-10 DIAGNOSIS — Z78.0 OSTEOPENIA AFTER MENOPAUSE: ICD-10-CM

## 2022-02-10 DIAGNOSIS — M85.80 OSTEOPENIA AFTER MENOPAUSE: ICD-10-CM

## 2022-02-10 DIAGNOSIS — F41.9 ANXIETY: ICD-10-CM

## 2022-02-10 LAB
ANION GAP SERPL CALC-SCNC: 8 MMOL/L (ref 0–18)
BUN BLD-MCNC: 20 MG/DL (ref 7–18)
CALCIUM BLD-MCNC: 9.7 MG/DL (ref 8.5–10.1)
CHLORIDE SERPL-SCNC: 106 MMOL/L (ref 98–112)
CO2 SERPL-SCNC: 25 MMOL/L (ref 21–32)
CREAT BLD-MCNC: 0.85 MG/DL
FASTING STATUS PATIENT QL REPORTED: NO
GLUCOSE BLD-MCNC: 139 MG/DL (ref 70–99)
OSMOLALITY SERPL CALC.SUM OF ELEC: 293 MOSM/KG (ref 275–295)
POTASSIUM SERPL-SCNC: 3.9 MMOL/L (ref 3.5–5.1)
SODIUM SERPL-SCNC: 139 MMOL/L (ref 136–145)

## 2022-02-10 PROCEDURE — 96374 THER/PROPH/DIAG INJ IV PUSH: CPT

## 2022-02-10 PROCEDURE — 99214 OFFICE O/P EST MOD 30 MIN: CPT | Performed by: SPECIALIST

## 2022-02-10 RX ORDER — VENLAFAXINE HYDROCHLORIDE 37.5 MG/1
37.5 CAPSULE, EXTENDED RELEASE ORAL DAILY
Qty: 90 CAPSULE | Refills: 1 | Status: SHIPPED | OUTPATIENT
Start: 2022-02-10

## 2022-02-10 NOTE — PROGRESS NOTES
Patient is here today for follow up with Kay Post for Breast Cancer. Patient has been on Anastrozole for approximately one month. Zometa today. Patient denies pain. Patient stated Hot Flashes, Sleep disturbances and feels more anxious than prior to starting the Anastrozole. Medication list and medical history were reviewed and updated. Education Record    Learner:  Patient    Disease / Diagnosis: Breast Cancer    Barriers / Limitations:  None   Comments:    Method:  Brief focused, Discussion, Printed material and Reinforcement   Comments:    General Topics:  Medication, Pain, Procedure and Plan of care reviewed   Comments:    Outcome:  Shows understanding   Comments:    Erik Alejandro MD visit. Patient sent to waiting room. Treating RN notified. AVS provided and follow up reviewed. Patient instructed to call as needed.

## 2022-02-11 ENCOUNTER — APPOINTMENT (OUTPATIENT)
Dept: HEMATOLOGY/ONCOLOGY | Facility: HOSPITAL | Age: 62
End: 2022-02-11
Attending: GENETIC COUNSELOR, MS
Payer: COMMERCIAL

## 2022-03-28 ENCOUNTER — HOSPITAL ENCOUNTER (OUTPATIENT)
Dept: MAMMOGRAPHY | Facility: HOSPITAL | Age: 62
Discharge: HOME | End: 2022-03-28
Attending: SURGERY
Payer: COMMERCIAL

## 2022-03-28 DIAGNOSIS — C50.512 MALIGNANT NEOPLASM OF LOWER-OUTER QUADRANT OF LEFT BREAST OF FEMALE, ESTROGEN RECEPTOR POSITIVE (HCC): ICD-10-CM

## 2022-03-28 DIAGNOSIS — Z17.0 MALIGNANT NEOPLASM OF LOWER-OUTER QUADRANT OF LEFT BREAST OF FEMALE, ESTROGEN RECEPTOR POSITIVE (HCC): ICD-10-CM

## 2022-03-28 PROCEDURE — 77062 BREAST TOMOSYNTHESIS BI: CPT | Performed by: SURGERY

## 2022-03-28 PROCEDURE — 77066 DX MAMMO INCL CAD BI: CPT | Performed by: SURGERY

## 2022-03-29 ENCOUNTER — TELEMEDICINE (OUTPATIENT)
Dept: HEMATOLOGY/ONCOLOGY | Facility: HOSPITAL | Age: 62
End: 2022-03-29
Attending: NURSE PRACTITIONER
Payer: COMMERCIAL

## 2022-03-29 DIAGNOSIS — C50.912 INVASIVE DUCTAL CARCINOMA OF BREAST, FEMALE, LEFT (HCC): ICD-10-CM

## 2022-03-29 DIAGNOSIS — Z71.9 COUNSELING, UNSPECIFIED: ICD-10-CM

## 2022-03-29 DIAGNOSIS — Z08 ENCOUNTER FOR FOLLOW-UP EXAMINATION AFTER COMPLETED TREATMENT FOR MALIGNANT NEOPLASM: ICD-10-CM

## 2022-03-29 DIAGNOSIS — M25.612 DECREASED RANGE OF MOTION OF SHOULDER, LEFT: ICD-10-CM

## 2022-03-29 PROCEDURE — 99215 OFFICE O/P EST HI 40 MIN: CPT | Performed by: NURSE PRACTITIONER

## 2022-03-29 NOTE — PROGRESS NOTES
Virtual/Video Visit    Laura Clemons verbally consents to a Video phone visit service on 3/29/2022. She understands and accepts financial responsibility for any deductible, co-insurance and/or co-pays associated with this service. Duration of the service: 85 minutes    Summary of topics discussed:   A video telemedicine Survivorship Clinic visit was done in lieu of an in-person visit. The patient was sent materials to her home via certified mail in advance of our conversation today. Materials included a copy of the Survivorship Care Plan and education/supportive materials. Laura Clemons has been followed since 2015 for atypical ductal hyperplasia. In 8/2021 she was diagnosed with a left breast cancer. She had a left lumpectomy with sentinel node biopsy on 9/9/2021, Stage IA, ER+, MO+, HER2-. Oncotype Dx score was 19 with no chemotherapy recommended. She had radiation therapy that completed on 11/23/2021. She started on Anastrozole in 1/2022. Bone density test in 9/2021 showed osteopenia and she was started on Zoledronic acid on 2/10/2022. (See Oncology Treatment Summary SCP for details). Current condition/issues:  Laura Clemons has similar side effects as she had at the last visit. The fatigue has improved, but is still present. Her sleep changes are her most concerning issue. She wakes frequently during the night. She is working on a strict sleep routine to attempts to improve this. She still notes anxiety, but denies depression. The hot flashes are tolerable and mainly in the AM. She is aware that Venlafaxine could be helpful for these symptoms, but she prefers not to take it. She is hoping that better sleep will improve the anxiety and fatigue without the need for medication. In the last two days she has noted generalized joint discomfort. This improves through the day with activity. She has experienced this in the past while on Atorvastatin and is uncertain if related or if from the Anastrozole.   She also notes intermittent numbness in both arms (L>R) that occurs when lying down after sleeping. She has no numbness in either arm when awake and moving. She is taking Anastrozole daily and takes supplemental Vitamin D3 and Calcium. She had first Zoledronic acid in February and had flu-like reactions with shaking chills and myalgias) 24 hours after the infusion that lasted 12 hours. She is pleased with cosmetic outcome of the surgery. She is able to fully move both arms but is concerned with a tightness and restriction in her left arm with full motion. She is interested in seeing OT for evaluation. She has no noted swelling in her left arm. She has had other stressors, including having COVID in 1/2022 that caused memory issues, which is starting to improve. She works full-time and also has family members dependent on her for care. She exercises twice weekly with an interval training class, yoga and occasional walks. She hopes to increase exercise and lose some weight. She is interested in weight loss programs. Current BMI is 26. She is hoping that some of her symptoms will improve with time. We reviewed the elements of the survivorship care plan and follow-up plans. Reviewed Cancer Surveillance (office visits, breast imaging, bone density) and that Dr. Denton Anderson and Dr. Mahi Silva will oversee this care. She will see both of them on 5/31. She had a bilateral mammogram on 3/28 and results were shared by Dr. Mahi Silva, with good findings. Next imaging will be discussed at future office visit. Next bone density will be in 9/2023. Next zoledronic acid will be due in 8/2022. Reviewed Schedule of other clinic visits with Primary Care and specialists: Dr. Shelby Roque will continue to manage all general health care recommended for age and gender including cancer screening tests. She is due for colorectal cancer screening. Reviewed concerning symptoms that she should report to any Provider. Reviewed possible late and long-term effects related to the treatment that was received. (per above). An OT lymphedema referral was placed for evaluation of reported left arm ROM issues. Reviewed Lifestyle/behaviors that can affect ongoing health, including the risk for the cancer coming back or developing another cancer. We reviewed importance of exercise and good nutrition with a plant based diet. She was provided information on weight loss options and nutrition guidance. We discussed sun safety. Reviewed common cancer survivor issues and resources available. I focused on stress management, support with counseling or support groups, nutrition and exercise options and survivorship programs:  -SCP and patient letter  -Breast Survivorship Guide   -[facility] in [place] - during pandemic, virtual programming available and includes: nutrition and exercise classes, mind/body programs, education, support groups  -[facility] in [place] pandemic, virtual programming available and includes: education, support groups, special programs, nutrition and exercise classes, movement classes, mind/body programs,  survivorship programs, individual counseling  -[place] [facility]-referral placed per patient request  -Jump Start Program  -[facility] [place], Healthy You weight loss program  -[employer] handout-nutrition, physical activity  -ACS Cancer Screening Guidelines  -[facility] Breast Support Group  -Websites: [address] for your Life, Living Beyond Breast Cancer, Facebook: [facility]     The patient was given the opportunity to ask questions. She had a few questions and verbalized understanding of the information we discussed today. She was encouraged to call with any further questions. I spent a total of 85 minutes with the patient that included review of the Survivorship Care plan and additional resources and documentation.   Two-way real time interactive audio and video telecommunication was used for this visit.   TRISH Garcia

## 2022-05-02 ENCOUNTER — TELEPHONE (OUTPATIENT)
Dept: HEMATOLOGY/ONCOLOGY | Facility: HOSPITAL | Age: 62
End: 2022-05-02

## 2022-05-02 NOTE — TELEPHONE ENCOUNTER
Received call from patient. She has been having shoulder and arm pain for the last week. She has stopped taking any of her medications, including her AI. She has not noticed any improvement in her symptoms. Encouraged patient to call her PCP for examination and possible imaging. She does have an order from [facility] for PT and PT- she will call to schedule  Pt verbalized understanding.

## 2022-05-31 ENCOUNTER — OFFICE VISIT (OUTPATIENT)
Dept: SURGERY | Facility: CLINIC | Age: 62
End: 2022-05-31
Payer: COMMERCIAL

## 2022-05-31 ENCOUNTER — OFFICE VISIT (OUTPATIENT)
Dept: HEMATOLOGY/ONCOLOGY | Facility: HOSPITAL | Age: 62
End: 2022-05-31
Attending: GENETIC COUNSELOR, MS
Payer: COMMERCIAL

## 2022-05-31 VITALS
SYSTOLIC BLOOD PRESSURE: 105 MMHG | DIASTOLIC BLOOD PRESSURE: 54 MMHG | BODY MASS INDEX: 26.06 KG/M2 | OXYGEN SATURATION: 96 % | TEMPERATURE: 97 F | HEART RATE: 73 BPM | WEIGHT: 166 LBS | RESPIRATION RATE: 16 BRPM | HEIGHT: 67.01 IN

## 2022-05-31 VITALS
OXYGEN SATURATION: 96 % | RESPIRATION RATE: 16 BRPM | BODY MASS INDEX: 26.08 KG/M2 | HEIGHT: 67.01 IN | HEART RATE: 73 BPM | WEIGHT: 166.19 LBS | SYSTOLIC BLOOD PRESSURE: 105 MMHG | DIASTOLIC BLOOD PRESSURE: 54 MMHG

## 2022-05-31 DIAGNOSIS — Z79.811 AROMATASE INHIBITOR USE: ICD-10-CM

## 2022-05-31 DIAGNOSIS — Z17.0 MALIGNANT NEOPLASM OF LOWER-OUTER QUADRANT OF LEFT BREAST OF FEMALE, ESTROGEN RECEPTOR POSITIVE (HCC): Primary | ICD-10-CM

## 2022-05-31 DIAGNOSIS — C50.512 MALIGNANT NEOPLASM OF LOWER-OUTER QUADRANT OF LEFT BREAST OF FEMALE, ESTROGEN RECEPTOR POSITIVE (HCC): Primary | ICD-10-CM

## 2022-05-31 DIAGNOSIS — C50.912 INVASIVE DUCTAL CARCINOMA OF BREAST, FEMALE, LEFT (HCC): Primary | ICD-10-CM

## 2022-05-31 DIAGNOSIS — M85.80 OSTEOPENIA AFTER MENOPAUSE: ICD-10-CM

## 2022-05-31 DIAGNOSIS — Z78.0 OSTEOPENIA AFTER MENOPAUSE: ICD-10-CM

## 2022-05-31 PROCEDURE — 3008F BODY MASS INDEX DOCD: CPT | Performed by: SURGERY

## 2022-05-31 PROCEDURE — 3074F SYST BP LT 130 MM HG: CPT | Performed by: SURGERY

## 2022-05-31 PROCEDURE — 3078F DIAST BP <80 MM HG: CPT | Performed by: SURGERY

## 2022-05-31 PROCEDURE — 99213 OFFICE O/P EST LOW 20 MIN: CPT | Performed by: SURGERY

## 2022-05-31 PROCEDURE — 99214 OFFICE O/P EST MOD 30 MIN: CPT | Performed by: SPECIALIST

## 2022-07-12 ENCOUNTER — IMMUNIZATION (OUTPATIENT)
Dept: LAB | Age: 62
End: 2022-07-12
Attending: EMERGENCY MEDICINE
Payer: COMMERCIAL

## 2022-07-12 DIAGNOSIS — Z23 NEED FOR VACCINATION: Primary | ICD-10-CM

## 2022-07-12 PROCEDURE — 0054A SARSCOV2 VAC 30MCG TRS SUCR: CPT

## 2022-08-09 ENCOUNTER — OFFICE VISIT (OUTPATIENT)
Dept: HEMATOLOGY/ONCOLOGY | Facility: HOSPITAL | Age: 62
End: 2022-08-09
Attending: GENETIC COUNSELOR, MS
Payer: COMMERCIAL

## 2022-08-09 VITALS
RESPIRATION RATE: 16 BRPM | HEIGHT: 67.01 IN | HEART RATE: 82 BPM | WEIGHT: 168.81 LBS | OXYGEN SATURATION: 96 % | SYSTOLIC BLOOD PRESSURE: 105 MMHG | DIASTOLIC BLOOD PRESSURE: 66 MMHG | TEMPERATURE: 98 F | BODY MASS INDEX: 26.49 KG/M2

## 2022-08-09 DIAGNOSIS — E78.00 HYPERCHOLESTEROLEMIA: ICD-10-CM

## 2022-08-09 DIAGNOSIS — C50.912 INVASIVE DUCTAL CARCINOMA OF BREAST, FEMALE, LEFT (HCC): Primary | ICD-10-CM

## 2022-08-09 DIAGNOSIS — Z79.811 AROMATASE INHIBITOR USE: ICD-10-CM

## 2022-08-09 DIAGNOSIS — C50.912 INVASIVE DUCTAL CARCINOMA OF BREAST, FEMALE, LEFT (HCC): ICD-10-CM

## 2022-08-09 DIAGNOSIS — Z78.0 OSTEOPENIA AFTER MENOPAUSE: ICD-10-CM

## 2022-08-09 DIAGNOSIS — M85.80 OSTEOPENIA AFTER MENOPAUSE: ICD-10-CM

## 2022-08-09 DIAGNOSIS — E78.00 HYPERCHOLESTEROLEMIA: Primary | ICD-10-CM

## 2022-08-09 LAB
ALBUMIN SERPL-MCNC: 3.5 G/DL (ref 3.4–5)
ALBUMIN/GLOB SERPL: 0.9 {RATIO} (ref 1–2)
ALP LIVER SERPL-CCNC: 40 U/L
ALT SERPL-CCNC: 27 U/L
ANION GAP SERPL CALC-SCNC: 9 MMOL/L (ref 0–18)
AST SERPL-CCNC: 16 U/L (ref 15–37)
BILIRUB SERPL-MCNC: 0.3 MG/DL (ref 0.1–2)
BUN BLD-MCNC: 18 MG/DL (ref 7–18)
CALCIUM BLD-MCNC: 9.4 MG/DL (ref 8.5–10.1)
CHLORIDE SERPL-SCNC: 111 MMOL/L (ref 98–112)
CO2 SERPL-SCNC: 22 MMOL/L (ref 21–32)
CREAT BLD-MCNC: 0.85 MG/DL
FASTING STATUS PATIENT QL REPORTED: NO
GFR SERPLBLD BASED ON 1.73 SQ M-ARVRAT: 79 ML/MIN/1.73M2 (ref 60–?)
GLOBULIN PLAS-MCNC: 3.8 G/DL (ref 2.8–4.4)
GLUCOSE BLD-MCNC: 95 MG/DL (ref 70–99)
OSMOLALITY SERPL CALC.SUM OF ELEC: 296 MOSM/KG (ref 275–295)
POTASSIUM SERPL-SCNC: 4 MMOL/L (ref 3.5–5.1)
PROT SERPL-MCNC: 7.3 G/DL (ref 6.4–8.2)
SODIUM SERPL-SCNC: 142 MMOL/L (ref 136–145)

## 2022-08-09 PROCEDURE — 96374 THER/PROPH/DIAG INJ IV PUSH: CPT

## 2022-08-09 PROCEDURE — 99214 OFFICE O/P EST MOD 30 MIN: CPT | Performed by: NURSE PRACTITIONER

## 2022-08-09 RX ORDER — CEFUROXIME AXETIL 500 MG/1
500 TABLET ORAL 2 TIMES DAILY
COMMUNITY
Start: 2022-08-03

## 2022-08-09 RX ORDER — ANASTROZOLE 1 MG/1
1 TABLET ORAL DAILY
Qty: 90 TABLET | Refills: 1 | Status: SHIPPED | OUTPATIENT
Start: 2022-08-09

## 2022-08-09 RX ORDER — METHYLPREDNISOLONE 4 MG/1
TABLET ORAL
COMMUNITY
Start: 2022-08-03

## 2022-08-09 NOTE — PROGRESS NOTES
Education Record    Learner:  Patient    Disease / Diagnosis:Invasive ductal carcinoma of breast, female, left     Barriers / Limitations:  None   Comments:    Method:  Brief focused   Comments:    General Topics:  Infection, Medication, Pain, Precautions, Procedure, Side effects and symptom management, Plan of care reviewed and Fall risk and prevention   Comments:    Outcome:  Shows understanding   Comments:    Received her Zometa.  Given new AVS

## 2022-08-09 NOTE — PROGRESS NOTES
Patient presents with: Follow - Up: APN assessment    Pt is here for follow up and maintenance zometa. Eating and drinking without issue; denies N,V,D,C. Is seeing PT for left rotator cuff therapy; energy is improved; she is sleeping better; hot flashes are reduced.     Education Record    Learner:  Patient    Disease / Diagnosis: breast cancer    Barriers / Limitations:  None   Comments:    Method:  Brief focused   Comments:    General Topics:  Diet, Medication, Pain, Side effects and symptom management and Plan of care reviewed   Comments:    Outcome:  Shows understanding   Comments:

## 2022-08-31 ENCOUNTER — HOSPITAL ENCOUNTER (OUTPATIENT)
Dept: MRI IMAGING | Facility: HOSPITAL | Age: 62
Discharge: HOME | End: 2022-08-31
Attending: SURGERY
Payer: COMMERCIAL

## 2022-08-31 DIAGNOSIS — Z17.0 MALIGNANT NEOPLASM OF LOWER-OUTER QUADRANT OF LEFT BREAST OF FEMALE, ESTROGEN RECEPTOR POSITIVE (HCC): ICD-10-CM

## 2022-08-31 DIAGNOSIS — C50.512 MALIGNANT NEOPLASM OF LOWER-OUTER QUADRANT OF LEFT BREAST OF FEMALE, ESTROGEN RECEPTOR POSITIVE (HCC): ICD-10-CM

## 2022-08-31 PROCEDURE — 77049 MRI BREAST C-+ W/CAD BI: CPT | Performed by: SURGERY

## 2022-08-31 PROCEDURE — A9575 INJ GADOTERATE MEGLUMI 0.1ML: HCPCS

## 2022-09-16 ENCOUNTER — ORDER TRANSCRIPTION (OUTPATIENT)
Dept: ADMINISTRATIVE | Facility: HOSPITAL | Age: 62
End: 2022-09-16

## 2022-09-16 DIAGNOSIS — Z13.6 SCREENING FOR CARDIOVASCULAR CONDITION: Primary | ICD-10-CM

## 2022-09-19 ENCOUNTER — HOSPITAL ENCOUNTER (OUTPATIENT)
Dept: CT IMAGING | Facility: HOSPITAL | Age: 62
End: 2022-09-19
Attending: INTERNAL MEDICINE

## 2022-09-19 DIAGNOSIS — Z13.6 SCREENING FOR CARDIOVASCULAR CONDITION: ICD-10-CM

## 2022-09-19 NOTE — PROGRESS NOTES
Date of Service 9/19/2022    Shayy Hawkins  Date of Birth 9/17/1960    Patient Age: 63year old    PCP: Monique Ro MD  [address] [facility] [address]    Consult Type  Type Scan/Screening: Heart Scan (7/28/2021 Heart Scan Calcium Score 0.0   This was done at [facility])  Preliminary Heart Scan Score: 0                Body Mass Index  There is no height or weight on file to calculate BMI. Lipid Profile  Cholesterol: 169, done on 6/14/2021. HDL Cholesterol: 62.9, done on 6/14/2021. LDL Cholesterol: 91, done on 6/14/2021. TriGlycerides 76, done on 6/14/2021. She is on cholesterol medication. Nurse Review  Risk factor information and results reviewed with Nurse: Yes     /66 no medication    Recommended Follow Up:  Consult your physician regarding[de-identified] Final Heart Scan Report; Discuss potential for Incidental Finding    No data recorded      Recommendations for Change:  Nutrition Changes: Low Saturated Fat;Low Fat Dairy  Cholesterol Modification (goal of therapy depends upon your risk): No Change Needed  Exercise: Enhance Current Program  Smoking Cessation: No Change Needed  Weight Management: Decrease Current Weight     Repeat Heart Scan: 5 years if Calcium Score is 0. 0; Discuss with your Physician          Jesús Recommended Resources:  Recommended Resources: Upcoming Classes, Medical Services and Health Library www. [employer]. Necely Joe RN        Please Contact the Nurse Heart Line with any Questions or Concerns [phone].

## 2022-10-03 ENCOUNTER — HOSPITAL ENCOUNTER (OUTPATIENT)
Dept: MAMMOGRAPHY | Facility: HOSPITAL | Age: 62
Discharge: HOME | End: 2022-10-03
Attending: SURGERY
Payer: COMMERCIAL

## 2022-10-03 DIAGNOSIS — Z17.0 MALIGNANT NEOPLASM OF LOWER-OUTER QUADRANT OF LEFT BREAST OF FEMALE, ESTROGEN RECEPTOR POSITIVE (HCC): ICD-10-CM

## 2022-10-03 DIAGNOSIS — C50.512 MALIGNANT NEOPLASM OF LOWER-OUTER QUADRANT OF LEFT BREAST OF FEMALE, ESTROGEN RECEPTOR POSITIVE (HCC): ICD-10-CM

## 2022-10-03 PROCEDURE — 77065 DX MAMMO INCL CAD UNI: CPT | Performed by: SURGERY

## 2022-10-03 PROCEDURE — 77061 BREAST TOMOSYNTHESIS UNI: CPT | Performed by: SURGERY

## 2022-11-08 ENCOUNTER — OFFICE VISIT (OUTPATIENT)
Dept: SURGERY | Facility: CLINIC | Age: 62
End: 2022-11-08
Payer: COMMERCIAL

## 2022-11-08 VITALS
DIASTOLIC BLOOD PRESSURE: 69 MMHG | RESPIRATION RATE: 16 BRPM | SYSTOLIC BLOOD PRESSURE: 110 MMHG | HEIGHT: 67.07 IN | WEIGHT: 165.81 LBS | OXYGEN SATURATION: 98 % | BODY MASS INDEX: 26.02 KG/M2 | HEART RATE: 67 BPM

## 2022-11-08 DIAGNOSIS — Z17.0 MALIGNANT NEOPLASM OF LOWER-OUTER QUADRANT OF LEFT BREAST OF FEMALE, ESTROGEN RECEPTOR POSITIVE (HCC): Primary | ICD-10-CM

## 2022-11-08 DIAGNOSIS — C50.512 MALIGNANT NEOPLASM OF LOWER-OUTER QUADRANT OF LEFT BREAST OF FEMALE, ESTROGEN RECEPTOR POSITIVE (HCC): Primary | ICD-10-CM

## 2022-11-08 PROCEDURE — 3074F SYST BP LT 130 MM HG: CPT | Performed by: SURGERY

## 2022-11-08 PROCEDURE — 3078F DIAST BP <80 MM HG: CPT | Performed by: SURGERY

## 2022-11-08 PROCEDURE — 99213 OFFICE O/P EST LOW 20 MIN: CPT | Performed by: SURGERY

## 2022-11-08 PROCEDURE — 3008F BODY MASS INDEX DOCD: CPT | Performed by: SURGERY

## 2022-11-17 ENCOUNTER — IMMUNIZATION (OUTPATIENT)
Dept: LAB | Age: 62
End: 2022-11-17
Attending: EMERGENCY MEDICINE
Payer: COMMERCIAL

## 2022-11-17 DIAGNOSIS — Z23 NEED FOR VACCINATION: Primary | ICD-10-CM

## 2022-11-17 PROCEDURE — 0124A SARSCOV2 VAC BVL 30MCG/0.3ML: CPT

## 2022-11-23 ENCOUNTER — OFFICE VISIT (OUTPATIENT)
Dept: OBGYN CLINIC | Facility: CLINIC | Age: 62
End: 2022-11-23
Payer: COMMERCIAL

## 2022-11-23 VITALS
SYSTOLIC BLOOD PRESSURE: 118 MMHG | WEIGHT: 170 LBS | BODY MASS INDEX: 26.68 KG/M2 | HEART RATE: 76 BPM | DIASTOLIC BLOOD PRESSURE: 68 MMHG | HEIGHT: 67.01 IN

## 2022-11-23 DIAGNOSIS — Z01.419 WELL WOMAN EXAM WITH ROUTINE GYNECOLOGICAL EXAM: Primary | ICD-10-CM

## 2022-11-23 DIAGNOSIS — R39.15 URINARY URGENCY: ICD-10-CM

## 2022-11-23 DIAGNOSIS — Z12.4 SCREENING FOR CERVICAL CANCER: ICD-10-CM

## 2022-11-23 LAB
APPEARANCE: CLEAR
BILIRUBIN: NEGATIVE
GLUCOSE (URINE DIPSTICK): NEGATIVE MG/DL
KETONES (URINE DIPSTICK): NEGATIVE MG/DL
LEUKOCYTES: NEGATIVE
MULTISTIX LOT#: NORMAL NUMERIC
NITRITE, URINE: NEGATIVE
OCCULT BLOOD: NEGATIVE
PH, URINE: 5 (ref 4.5–8)
PROTEIN (URINE DIPSTICK): NEGATIVE MG/DL
SPECIFIC GRAVITY: 1.01 (ref 1–1.03)
URINE-COLOR: YELLOW
UROBILINOGEN,SEMI-QN: 0.2 MG/DL (ref 0–1.9)

## 2022-12-27 ENCOUNTER — HOSPITAL ENCOUNTER (OUTPATIENT)
Dept: RADIATION ONCOLOGY | Facility: HOSPITAL | Age: 62
Discharge: HOME | End: 2022-12-27
Attending: INTERNAL MEDICINE
Payer: COMMERCIAL

## 2022-12-27 VITALS
TEMPERATURE: 99 F | OXYGEN SATURATION: 96 % | DIASTOLIC BLOOD PRESSURE: 75 MMHG | SYSTOLIC BLOOD PRESSURE: 122 MMHG | BODY MASS INDEX: 26 KG/M2 | WEIGHT: 168.81 LBS | HEART RATE: 76 BPM | RESPIRATION RATE: 18 BRPM

## 2022-12-27 DIAGNOSIS — C50.912 INVASIVE DUCTAL CARCINOMA OF BREAST, FEMALE, LEFT (HCC): Primary | ICD-10-CM

## 2022-12-27 NOTE — PATIENT INSTRUCTIONS
- WE WILL CALL TO SCHEDULE YOUR FOLLOW-UP APPOINTMENT WITH DR. PADRON IN ONE YEAR    - CALL [phone] IF YOU HAVE ANY QUESTIONS/CONCERNS REGARDING RADIATION THERAPY

## 2023-02-07 ENCOUNTER — APPOINTMENT (OUTPATIENT)
Dept: RADIATION ONCOLOGY | Facility: HOSPITAL | Age: 63
End: 2023-02-07
Attending: INTERNAL MEDICINE
Payer: COMMERCIAL

## 2023-02-09 ENCOUNTER — OFFICE VISIT (OUTPATIENT)
Dept: HEMATOLOGY/ONCOLOGY | Facility: HOSPITAL | Age: 63
End: 2023-02-09
Attending: GENETIC COUNSELOR, MS
Payer: COMMERCIAL

## 2023-02-09 VITALS
RESPIRATION RATE: 16 BRPM | BODY MASS INDEX: 27.19 KG/M2 | WEIGHT: 173.25 LBS | HEART RATE: 77 BPM | DIASTOLIC BLOOD PRESSURE: 75 MMHG | OXYGEN SATURATION: 98 % | HEIGHT: 67.01 IN | SYSTOLIC BLOOD PRESSURE: 125 MMHG | TEMPERATURE: 97 F

## 2023-02-09 DIAGNOSIS — C50.912 INVASIVE DUCTAL CARCINOMA OF BREAST, FEMALE, LEFT (HCC): Primary | ICD-10-CM

## 2023-02-09 DIAGNOSIS — M85.80 OSTEOPENIA AFTER MENOPAUSE: ICD-10-CM

## 2023-02-09 DIAGNOSIS — Z78.0 OSTEOPENIA AFTER MENOPAUSE: ICD-10-CM

## 2023-02-09 DIAGNOSIS — T45.1X5A AROMATASE INHIBITOR-ASSOCIATED ARTHRALGIA: ICD-10-CM

## 2023-02-09 DIAGNOSIS — Z79.811 AROMATASE INHIBITOR USE: ICD-10-CM

## 2023-02-09 DIAGNOSIS — L81.9 HYPERPIGMENTED SKIN LESION: ICD-10-CM

## 2023-02-09 DIAGNOSIS — M25.50 AROMATASE INHIBITOR-ASSOCIATED ARTHRALGIA: ICD-10-CM

## 2023-02-09 LAB
ANION GAP SERPL CALC-SCNC: 5 MMOL/L (ref 0–18)
BUN BLD-MCNC: 30 MG/DL (ref 7–18)
CALCIUM BLD-MCNC: 9.5 MG/DL (ref 8.5–10.1)
CHLORIDE SERPL-SCNC: 109 MMOL/L (ref 98–112)
CO2 SERPL-SCNC: 28 MMOL/L (ref 21–32)
CREAT BLD-MCNC: 0.86 MG/DL
GFR SERPLBLD BASED ON 1.73 SQ M-ARVRAT: 77 ML/MIN/1.73M2 (ref 60–?)
GLUCOSE BLD-MCNC: 93 MG/DL (ref 70–99)
OSMOLALITY SERPL CALC.SUM OF ELEC: 300 MOSM/KG (ref 275–295)
POTASSIUM SERPL-SCNC: 4.3 MMOL/L (ref 3.5–5.1)
SODIUM SERPL-SCNC: 142 MMOL/L (ref 136–145)

## 2023-02-09 PROCEDURE — 96374 THER/PROPH/DIAG INJ IV PUSH: CPT

## 2023-02-09 PROCEDURE — 99215 OFFICE O/P EST HI 40 MIN: CPT | Performed by: SPECIALIST

## 2023-02-09 NOTE — PROGRESS NOTES
Patient is here today for follow up with Kay Post  for Invasive Ductal Carcinoma of the left breast. Patient is on Anastrozole and Zometa therapy. Patient stated shoulder pain - and pain left axilla. Would like Kay Post to look at some moles on her back. Complaint of weight gain. Medication list,medical history and toxicities were reviewed and updated. Education Record    Learner:  Patient     Disease / Diagnosis: Invasive Ductal Carcinoma of the left breast    Barriers / Limitations:  None   Comments:    Method:  Brief focused, Discussion, Printed material and Reinforcement   Comments:    General Topics:  Medication, Pain, Procedure and Plan of care reviewed   Comments:    Outcome:  Shows understanding   Comments:    Post MD visit patient sent to waiting room. Treating RN dorian. Zometa today. AVS provided and follow up reviewed. Patient instructed to call as needed.

## 2023-02-09 NOTE — PROGRESS NOTES
Education Record    Learner:  Patient    Disease / Diagnosis: breast CA    Barriers / Limitations:  None   Comments:    Method:  Discussion   Comments:    General Topics:  Plan of care reviewed   Comments:    Outcome:  Shows understanding   Comments:    Pt here for zometa infusion. Tolerated well. Next appt scheduled for 6 months. Pt discharged in stable condition.

## 2023-03-13 ENCOUNTER — HOSPITAL ENCOUNTER (OUTPATIENT)
Age: 63
Discharge: HOME | End: 2023-03-13
Payer: COMMERCIAL

## 2023-03-13 VITALS
SYSTOLIC BLOOD PRESSURE: 118 MMHG | TEMPERATURE: 99 F | RESPIRATION RATE: 18 BRPM | DIASTOLIC BLOOD PRESSURE: 57 MMHG | OXYGEN SATURATION: 96 % | HEART RATE: 94 BPM

## 2023-03-13 DIAGNOSIS — H66.002 ACUTE SUPPURATIVE OTITIS MEDIA OF LEFT EAR WITHOUT SPONTANEOUS RUPTURE OF TYMPANIC MEMBRANE, RECURRENCE NOT SPECIFIED: Primary | ICD-10-CM

## 2023-03-13 DIAGNOSIS — J01.40 ACUTE PANSINUSITIS, RECURRENCE NOT SPECIFIED: ICD-10-CM

## 2023-03-13 LAB — SARS-COV-2 RNA RESP QL NAA+PROBE: NOT DETECTED

## 2023-03-13 PROCEDURE — U0002 COVID-19 LAB TEST NON-CDC: HCPCS | Performed by: NURSE PRACTITIONER

## 2023-03-13 PROCEDURE — 99213 OFFICE O/P EST LOW 20 MIN: CPT | Performed by: NURSE PRACTITIONER

## 2023-03-13 RX ORDER — AMOXICILLIN AND CLAVULANATE POTASSIUM 875; 125 MG/1; MG/1
1 TABLET, FILM COATED ORAL 2 TIMES DAILY
Qty: 20 TABLET | Refills: 0 | Status: SHIPPED | OUTPATIENT
Start: 2023-03-13 | End: 2023-03-23

## 2023-03-13 NOTE — DISCHARGE INSTRUCTIONS
Flonase as discussed. Continue Malik Elizabeth. Take Augmentin as prescribed. Follow-up with your PCP.   ENT follow-up if needed

## 2023-03-13 NOTE — ED INITIAL ASSESSMENT (HPI)
Pt began 5-6 days ago with fatigue that moved into sinus congestion and sore throat and now has thick green drainage.   2 at home covid tests negative

## 2023-03-27 ENCOUNTER — TELEPHONE (OUTPATIENT)
Dept: HEMATOLOGY/ONCOLOGY | Facility: HOSPITAL | Age: 63
End: 2023-03-27

## 2023-03-27 RX ORDER — ANASTROZOLE 1 MG/1
1 TABLET ORAL DAILY
Qty: 90 TABLET | Refills: 1 | Status: SHIPPED | OUTPATIENT
Start: 2023-03-27

## 2023-03-27 NOTE — TELEPHONE ENCOUNTER
Patient went to [facility] for prescription refill  anastrozole 1 MG Oral Tab tab. Was told script was discontinued.

## 2023-03-31 ENCOUNTER — HOSPITAL ENCOUNTER (OUTPATIENT)
Dept: MAMMOGRAPHY | Facility: HOSPITAL | Age: 63
Discharge: HOME | End: 2023-03-31
Attending: SURGERY
Payer: COMMERCIAL

## 2023-03-31 DIAGNOSIS — C50.512 MALIGNANT NEOPLASM OF LOWER-OUTER QUADRANT OF LEFT BREAST OF FEMALE, ESTROGEN RECEPTOR POSITIVE (HCC): ICD-10-CM

## 2023-03-31 DIAGNOSIS — Z17.0 MALIGNANT NEOPLASM OF LOWER-OUTER QUADRANT OF LEFT BREAST OF FEMALE, ESTROGEN RECEPTOR POSITIVE (HCC): ICD-10-CM

## 2023-03-31 PROCEDURE — 77066 DX MAMMO INCL CAD BI: CPT | Performed by: SURGERY

## 2023-03-31 PROCEDURE — 77062 BREAST TOMOSYNTHESIS BI: CPT | Performed by: SURGERY

## 2023-07-18 ENCOUNTER — HOSPITAL ENCOUNTER (OUTPATIENT)
Dept: BONE DENSITY | Age: 63
Discharge: HOME | End: 2023-07-18
Attending: SPECIALIST
Payer: COMMERCIAL

## 2023-07-18 DIAGNOSIS — M85.80 OSTEOPENIA AFTER MENOPAUSE: ICD-10-CM

## 2023-07-18 DIAGNOSIS — Z79.811 AROMATASE INHIBITOR USE: ICD-10-CM

## 2023-07-18 DIAGNOSIS — C50.912 INVASIVE DUCTAL CARCINOMA OF BREAST, FEMALE, LEFT (HCC): ICD-10-CM

## 2023-07-18 DIAGNOSIS — Z78.0 OSTEOPENIA AFTER MENOPAUSE: ICD-10-CM

## 2023-07-18 PROCEDURE — 77080 DXA BONE DENSITY AXIAL: CPT | Performed by: SPECIALIST

## 2023-08-03 ENCOUNTER — OFFICE VISIT (OUTPATIENT)
Dept: HEMATOLOGY/ONCOLOGY | Facility: HOSPITAL | Age: 63
End: 2023-08-03
Attending: GENETIC COUNSELOR, MS
Payer: COMMERCIAL

## 2023-08-03 VITALS
BODY MASS INDEX: 26.53 KG/M2 | SYSTOLIC BLOOD PRESSURE: 125 MMHG | RESPIRATION RATE: 16 BRPM | TEMPERATURE: 97 F | HEIGHT: 67.01 IN | HEART RATE: 67 BPM | DIASTOLIC BLOOD PRESSURE: 71 MMHG | OXYGEN SATURATION: 95 % | WEIGHT: 169 LBS

## 2023-08-03 DIAGNOSIS — Z79.811 AROMATASE INHIBITOR USE: ICD-10-CM

## 2023-08-03 DIAGNOSIS — Z78.0 OSTEOPENIA AFTER MENOPAUSE: ICD-10-CM

## 2023-08-03 DIAGNOSIS — M85.80 OSTEOPENIA AFTER MENOPAUSE: ICD-10-CM

## 2023-08-03 DIAGNOSIS — C50.912 INVASIVE DUCTAL CARCINOMA OF BREAST, FEMALE, LEFT (HCC): Primary | ICD-10-CM

## 2023-08-03 LAB
ANION GAP SERPL CALC-SCNC: 8 MMOL/L (ref 0–18)
BUN BLD-MCNC: 20 MG/DL (ref 7–18)
CALCIUM BLD-MCNC: 9.4 MG/DL (ref 8.5–10.1)
CHLORIDE SERPL-SCNC: 109 MMOL/L (ref 98–112)
CO2 SERPL-SCNC: 21 MMOL/L (ref 21–32)
CREAT BLD-MCNC: 0.86 MG/DL
EGFRCR SERPLBLD CKD-EPI 2021: 77 ML/MIN/1.73M2 (ref 60–?)
FASTING STATUS PATIENT QL REPORTED: NO
GLUCOSE BLD-MCNC: 155 MG/DL (ref 70–99)
OSMOLALITY SERPL CALC.SUM OF ELEC: 292 MOSM/KG (ref 275–295)
POTASSIUM SERPL-SCNC: 4.1 MMOL/L (ref 3.5–5.1)
SODIUM SERPL-SCNC: 138 MMOL/L (ref 136–145)

## 2023-08-03 PROCEDURE — 99214 OFFICE O/P EST MOD 30 MIN: CPT | Performed by: NURSE PRACTITIONER

## 2023-08-03 PROCEDURE — 96374 THER/PROPH/DIAG INJ IV PUSH: CPT

## 2023-08-03 NOTE — PROGRESS NOTES
Patient is here today for 6 month follow up with Timo Batman NALLELY for Invasive ductal carcinoma of the left breast. Exemestane therapy. Patient denies stated patient from Left rotator cuff surgery - current physical therapy. Medication list,medical history and toxicities were reviewed and updated. Education Record    Learner:  Patient      Disease / Diagnosis: Invasive ductal carcinoma    Barriers / Limitations:  None   Comments:    Method:  Brief focused, Discussion, Printed material and Reinforcement   Comments:    General Topics:  Medication, Pain, Procedure and Plan of care reviewed   Comments:    Outcome:  Shows understanding   Comments:  Post APN visit. Patient sent to waiting room Treating RN dorian SINGH provided and follow up reviewed. Patient instructed to call as needed.

## 2023-08-03 NOTE — PROGRESS NOTES
Education Record    Learner:  Patient    Disease / Diagnosis: breast ca    Barriers / Limitations:  None   Comments:    Method:  Discussion   Comments:    General Topics:  Plan of care reviewed   Comments:    Outcome:  Shows understanding   Comments:    Pt here for zometa infusion. Tolerated well. Next appt scheduled for 6 months, pt stating she will get labs done the day prior at outpatient lab. Discharged in stable condition.

## 2023-08-09 ENCOUNTER — OFFICE VISIT (OUTPATIENT)
Dept: SURGERY | Facility: CLINIC | Age: 63
End: 2023-08-09
Payer: COMMERCIAL

## 2023-08-09 VITALS
DIASTOLIC BLOOD PRESSURE: 81 MMHG | RESPIRATION RATE: 17 BRPM | OXYGEN SATURATION: 96 % | BODY MASS INDEX: 26 KG/M2 | SYSTOLIC BLOOD PRESSURE: 129 MMHG | TEMPERATURE: 98 F | WEIGHT: 169 LBS | HEART RATE: 72 BPM

## 2023-08-09 DIAGNOSIS — C50.512 CARCINOMA OF LOWER-OUTER QUADRANT OF LEFT BREAST IN FEMALE, ESTROGEN RECEPTOR POSITIVE: Primary | ICD-10-CM

## 2023-08-09 DIAGNOSIS — Z17.0 CARCINOMA OF LOWER-OUTER QUADRANT OF LEFT BREAST IN FEMALE, ESTROGEN RECEPTOR POSITIVE: Primary | ICD-10-CM

## 2023-08-09 PROCEDURE — 3074F SYST BP LT 130 MM HG: CPT | Performed by: SURGERY

## 2023-08-09 PROCEDURE — 3079F DIAST BP 80-89 MM HG: CPT | Performed by: SURGERY

## 2023-08-09 PROCEDURE — 99213 OFFICE O/P EST LOW 20 MIN: CPT | Performed by: SURGERY

## 2023-08-09 RX ORDER — ROSUVASTATIN CALCIUM 20 MG/1
20 TABLET, COATED ORAL NIGHTLY
COMMUNITY
Start: 2023-06-07

## 2023-08-14 PROBLEM — Z17.0: Status: ACTIVE | Noted: 2023-08-14

## 2023-08-14 PROBLEM — Z17.0 CARCINOMA OF LOWER-OUTER QUADRANT OF LEFT BREAST IN FEMALE, ESTROGEN RECEPTOR POSITIVE: Status: ACTIVE | Noted: 2023-08-14

## 2023-08-14 PROBLEM — C50.512 CARCINOMA OF LOWER-OUTER QUADRANT OF LEFT BREAST IN FEMALE, ESTROGEN RECEPTOR POSITIVE: Status: ACTIVE | Noted: 2023-08-14

## 2023-08-14 PROBLEM — Z17.0 CARCINOMA OF LOWER-OUTER QUADRANT OF LEFT BREAST IN FEMALE, ESTROGEN RECEPTOR POSITIVE (HCC): Status: ACTIVE | Noted: 2023-08-14

## 2023-08-14 PROBLEM — C50.512 CARCINOMA OF LOWER-OUTER QUADRANT OF LEFT BREAST IN FEMALE, ESTROGEN RECEPTOR POSITIVE (HCC): Status: ACTIVE | Noted: 2023-08-14

## 2023-08-14 PROBLEM — C50.512: Status: ACTIVE | Noted: 2023-08-14

## 2023-08-30 ENCOUNTER — TELEPHONE (OUTPATIENT)
Dept: HEMATOLOGY/ONCOLOGY | Facility: HOSPITAL | Age: 63
End: 2023-08-30

## 2023-10-23 ENCOUNTER — HOSPITAL ENCOUNTER (OUTPATIENT)
Dept: MRI IMAGING | Facility: HOSPITAL | Age: 63
Discharge: HOME | End: 2023-10-23
Attending: SURGERY

## 2023-10-23 DIAGNOSIS — C50.512 MALIGNANT NEOPLASM OF LOWER-OUTER QUADRANT OF LEFT BREAST OF FEMALE, ESTROGEN RECEPTOR POSITIVE: ICD-10-CM

## 2023-10-23 DIAGNOSIS — Z17.0 MALIGNANT NEOPLASM OF LOWER-OUTER QUADRANT OF LEFT BREAST OF FEMALE, ESTROGEN RECEPTOR POSITIVE: ICD-10-CM

## 2023-10-23 PROCEDURE — 77049 MRI BREAST C-+ W/CAD BI: CPT | Performed by: SURGERY

## 2023-10-23 PROCEDURE — A9575 INJ GADOTERATE MEGLUMI 0.1ML: HCPCS | Performed by: SURGERY

## 2023-10-23 RX ORDER — GADOTERATE MEGLUMINE 376.9 MG/ML
15 INJECTION INTRAVENOUS
Status: COMPLETED | OUTPATIENT
Start: 2023-10-23 | End: 2023-10-23

## 2023-10-23 RX ADMIN — GADOTERATE MEGLUMINE 15 ML: 376.9 INJECTION INTRAVENOUS at 19:20:00

## 2023-11-03 ENCOUNTER — IMMUNIZATION (OUTPATIENT)
Dept: LAB | Age: 63
End: 2023-11-03
Attending: EMERGENCY MEDICINE
Payer: COMMERCIAL

## 2023-11-03 DIAGNOSIS — Z23 NEED FOR VACCINATION: Primary | ICD-10-CM

## 2023-11-03 PROCEDURE — 90480 ADMN SARSCOV2 VAC 1/ONLY CMP: CPT

## 2023-11-22 ENCOUNTER — OFFICE VISIT (OUTPATIENT)
Dept: OBGYN CLINIC | Facility: CLINIC | Age: 63
End: 2023-11-22
Payer: COMMERCIAL

## 2023-11-22 VITALS
BODY MASS INDEX: 26.84 KG/M2 | HEIGHT: 67 IN | DIASTOLIC BLOOD PRESSURE: 70 MMHG | SYSTOLIC BLOOD PRESSURE: 102 MMHG | WEIGHT: 171 LBS

## 2023-11-22 DIAGNOSIS — Z12.4 SCREENING FOR CERVICAL CANCER: ICD-10-CM

## 2023-11-22 DIAGNOSIS — R39.15 URINARY URGENCY: ICD-10-CM

## 2023-11-22 DIAGNOSIS — Z01.419 WELL WOMAN EXAM WITH ROUTINE GYNECOLOGICAL EXAM: Primary | ICD-10-CM

## 2023-11-22 DIAGNOSIS — M53.3 COCCYX PAIN: ICD-10-CM

## 2023-11-22 PROCEDURE — 88175 CYTOPATH C/V AUTO FLUID REDO: CPT | Performed by: NURSE PRACTITIONER

## 2023-11-22 PROCEDURE — 3078F DIAST BP <80 MM HG: CPT | Performed by: NURSE PRACTITIONER

## 2023-11-22 PROCEDURE — 99396 PREV VISIT EST AGE 40-64: CPT | Performed by: NURSE PRACTITIONER

## 2023-11-22 PROCEDURE — 3008F BODY MASS INDEX DOCD: CPT | Performed by: NURSE PRACTITIONER

## 2023-11-22 PROCEDURE — 3074F SYST BP LT 130 MM HG: CPT | Performed by: NURSE PRACTITIONER

## 2023-11-22 NOTE — PATIENT INSTRUCTIONS
THE MEDICAL [place] OF [facility] Internal Medicine    Dr. Liliana ALVA    [address] [facility].   [phone]

## 2023-11-29 LAB
.: NORMAL
.: NORMAL

## 2023-12-26 ENCOUNTER — HOSPITAL ENCOUNTER (OUTPATIENT)
Dept: RADIATION ONCOLOGY | Facility: HOSPITAL | Age: 63
Discharge: HOME | End: 2023-12-26
Attending: INTERNAL MEDICINE
Payer: COMMERCIAL

## 2023-12-26 VITALS
RESPIRATION RATE: 18 BRPM | TEMPERATURE: 98 F | HEART RATE: 79 BPM | WEIGHT: 172 LBS | OXYGEN SATURATION: 97 % | BODY MASS INDEX: 27 KG/M2 | SYSTOLIC BLOOD PRESSURE: 119 MMHG | DIASTOLIC BLOOD PRESSURE: 79 MMHG

## 2023-12-26 DIAGNOSIS — C50.512 CARCINOMA OF LOWER-OUTER QUADRANT OF LEFT BREAST IN FEMALE, ESTROGEN RECEPTOR POSITIVE  (HCC): Primary | ICD-10-CM

## 2023-12-26 DIAGNOSIS — Z17.0 CARCINOMA OF LOWER-OUTER QUADRANT OF LEFT BREAST IN FEMALE, ESTROGEN RECEPTOR POSITIVE  (HCC): Primary | ICD-10-CM

## 2023-12-26 PROCEDURE — 99213 OFFICE O/P EST LOW 20 MIN: CPT

## 2023-12-26 NOTE — PATIENT INSTRUCTIONS
- FOLLOW-UP WITH DR. PADRON ONLY IF NEEDED    - FOLLOW-UP WITH DR. Nu BAGLEY      - CALL [phone] IF YOU HAVE ANY QUESTIONS/CONCERNS REGARDING RADIATION THERAPY

## 2024-01-31 ENCOUNTER — TELEPHONE (OUTPATIENT)
Dept: HEMATOLOGY/ONCOLOGY | Facility: HOSPITAL | Age: 64
End: 2024-01-31

## 2024-01-31 DIAGNOSIS — Z78.0 OSTEOPENIA AFTER MENOPAUSE: ICD-10-CM

## 2024-01-31 DIAGNOSIS — M85.80 OSTEOPENIA AFTER MENOPAUSE: ICD-10-CM

## 2024-01-31 DIAGNOSIS — C50.912 INVASIVE DUCTAL CARCINOMA OF BREAST, FEMALE, LEFT (HCC): ICD-10-CM

## 2024-01-31 DIAGNOSIS — Z79.811 AROMATASE INHIBITOR USE: Primary | ICD-10-CM

## 2024-01-31 NOTE — TELEPHONE ENCOUNTER
Maddie calling asking for lab orders to be entered. Prior to her appt 2/8. For zometa infusion.she spoke with Roseanna in scheduling and was yelling at her because the [address] op location was closed. Please call patient when orders are entered please. I did schedule her 2/8 for her labs prior to appt. Thank you Dee Dee

## 2024-02-03 ENCOUNTER — LAB ENCOUNTER (OUTPATIENT)
Dept: LAB | Facility: HOSPITAL | Age: 64
End: 2024-02-03
Attending: SPECIALIST
Payer: COMMERCIAL

## 2024-02-03 DIAGNOSIS — Z78.0 OSTEOPENIA AFTER MENOPAUSE: ICD-10-CM

## 2024-02-03 DIAGNOSIS — M85.80 OSTEOPENIA AFTER MENOPAUSE: ICD-10-CM

## 2024-02-03 DIAGNOSIS — C50.912 INVASIVE DUCTAL CARCINOMA OF BREAST, FEMALE, LEFT (HCC): ICD-10-CM

## 2024-02-03 DIAGNOSIS — Z79.811 AROMATASE INHIBITOR USE: ICD-10-CM

## 2024-02-03 LAB
ANION GAP SERPL CALC-SCNC: 7 MMOL/L (ref 0–18)
BUN BLD-MCNC: 16 MG/DL (ref 9–23)
BUN/CREAT SERPL: 18.2 (ref 10–20)
CALCIUM BLD-MCNC: 9.6 MG/DL (ref 8.7–10.4)
CHLORIDE SERPL-SCNC: 109 MMOL/L (ref 98–112)
CO2 SERPL-SCNC: 27 MMOL/L (ref 21–32)
CREAT BLD-MCNC: 0.88 MG/DL
EGFRCR SERPLBLD CKD-EPI 2021: 75 ML/MIN/1.73M2 (ref 60–?)
FASTING STATUS PATIENT QL REPORTED: YES
GLUCOSE BLD-MCNC: 82 MG/DL (ref 70–99)
OSMOLALITY SERPL CALC.SUM OF ELEC: 296 MOSM/KG (ref 275–295)
POTASSIUM SERPL-SCNC: 4.2 MMOL/L (ref 3.5–5.1)
SODIUM SERPL-SCNC: 143 MMOL/L (ref 136–145)

## 2024-02-03 PROCEDURE — 80048 BASIC METABOLIC PNL TOTAL CA: CPT

## 2024-02-03 PROCEDURE — 36415 COLL VENOUS BLD VENIPUNCTURE: CPT

## 2024-02-08 ENCOUNTER — OFFICE VISIT (OUTPATIENT)
Dept: HEMATOLOGY/ONCOLOGY | Facility: HOSPITAL | Age: 64
End: 2024-02-08
Attending: GENETIC COUNSELOR, MS
Payer: COMMERCIAL

## 2024-02-08 VITALS
OXYGEN SATURATION: 99 % | SYSTOLIC BLOOD PRESSURE: 99 MMHG | HEIGHT: 67.01 IN | DIASTOLIC BLOOD PRESSURE: 64 MMHG | HEART RATE: 70 BPM | WEIGHT: 170 LBS | TEMPERATURE: 99 F | RESPIRATION RATE: 18 BRPM | BODY MASS INDEX: 26.68 KG/M2

## 2024-02-08 DIAGNOSIS — C50.912 INVASIVE DUCTAL CARCINOMA OF BREAST, FEMALE, LEFT (HCC): Primary | ICD-10-CM

## 2024-02-08 DIAGNOSIS — C50.512 CARCINOMA OF LOWER-OUTER QUADRANT OF LEFT BREAST IN FEMALE, ESTROGEN RECEPTOR POSITIVE  (HCC): ICD-10-CM

## 2024-02-08 DIAGNOSIS — Z17.0 CARCINOMA OF LOWER-OUTER QUADRANT OF LEFT BREAST IN FEMALE, ESTROGEN RECEPTOR POSITIVE  (HCC): ICD-10-CM

## 2024-02-08 DIAGNOSIS — Z78.0 OSTEOPENIA AFTER MENOPAUSE: ICD-10-CM

## 2024-02-08 DIAGNOSIS — M85.80 OSTEOPENIA AFTER MENOPAUSE: ICD-10-CM

## 2024-02-08 DIAGNOSIS — Z79.811 AROMATASE INHIBITOR USE: ICD-10-CM

## 2024-02-08 PROCEDURE — 99214 OFFICE O/P EST MOD 30 MIN: CPT | Performed by: SPECIALIST

## 2024-02-08 PROCEDURE — 96374 THER/PROPH/DIAG INJ IV PUSH: CPT

## 2024-02-08 NOTE — PROGRESS NOTES
Pt here for zometa . Pt denies any issues or concerns.      Ordering MD: Dr Mcdowell  Order Exp: ongoing     Pt tolerated infusion without difficulty or complaint. Reviewed next apt date/time: yes      Education Record  Learner:  Patient  Disease / Diagnosis: osteopenia  Barriers / Limitations:  None  Method:  Brief focused and Discussion  General Topics:  Medication and Plan of care reviewed  Outcome:  Shows understanding

## 2024-02-08 NOTE — PROGRESS NOTES
Education Record    Learner:  Patient    Disease / Diagnosis: left breast cancer   Annual FU    Barriers / Limitations:  None   Comments:    Method:  Discussion   Comments:    General Topics:  Plan of care reviewed   Comments:    Outcome:  Shows understanding   Comments:   Scheduled for zometa today.   Taking exemestane.   Breast imaging up to date.   Questions addressed.

## 2024-02-08 NOTE — PROGRESS NOTES
[facility] Progress Note      Patient Name: Dalila Marie   YOB: 1960  Medical Record Number: [id number]  Attending Physician: Monroe Mcdowell M.D.     The 21st Century Cures Act makes medical notes like these available to patients in the interest of transparency. Please be advised this is a medical document. Medical documents are intended to carry relevant information, facts as evident, and the clinical opinion of the practitioner. The medical note is intended as peer to peer communication and may appear blunt or direct. It is written in medical language and may contain abbreviations or verbiage that are unfamiliar.     Date of Visit: 2/8/2024      Chief Complaint  Invasive ductal carcinoma, left breast - follow up.    Oncologic History  Dalila Marie is a 63 year old post-menopausal female with history of atypical ductal hyperplasia diagnosed in 2015. She declined chemopreventative endocrine therapy.     On 07/14/2021 she underwent MRI breast which showed a stable 0.7 cm area of enhancement medial to the nipple line in the right breast. In the left breast was an irregularly shaped mass measuring 0.6 cm at the 5 o'clock position. There was no axillary adenopathy bilaterally. On 08/04/2021 she underwent MRI guided biopsy of the left breast. Pathology showed grade 1 invasive ductal carcinoma in a background of lobular carcinoma in situ and atypical lobular hyperplasia. Immunohistochemical studies were as follows: estrogen receptor 100% positive (strong), progesterone receptor 5% (strong), Her2 0 (negative), Ki67 3%.     On 09/09/2021 she underwent left breast lumpectomy with sentinel lymph node biopsy. Pathology showed intermediate grade invasive ductal carcinoma measuring 0.7 cm, associated intermediate grade ductal carcinoma-in-situ, atypical lobular hyperplasia, and 2 sentinel lymph nodes negative for metastasis (0/2). All surgical margins were negative. Her2 by IHC  was repeated on this final specimen and was 2+. Her2 by FISH was not amplified. OncotypeDX recurrence score was 19.     On 10/27/2021 she began adjuvant radiation therapy.     She began adjuvant aromatase inhibitor therapy with anastrazole in 2022. She later switched to exemestane for arthralgias.     Genetic testing revealed no known pathogenic variants in the following 9 genes: BRITTANY, BRCA1, BRCA2, CDH1, CHEK2, PALB2, PTEN, STK11, and TP53.      History of Present Illness  Patient returns for follow up. She states that the hot flashes and arthralgias are manageable. She complains of weight gain. No self palpated masses.     Performance Status   Karnofsky 100% - Normal, no complaints.    Past Medical History (historical data, reviewed by physician)  Invasive ductal carcinoma, left breast (as above); hyperlipidemia, migraine headaches; shingles.     Past Surgical History (historical data, reviewed by physician)  Left breast lumpectomy with sentinel lymph node biopsy (as above);  x 2; diagnostic laparoscopy.     Family History (historical data, reviewed by physician)  Ms. Marie has an adult son and adult daughter.      Ms. Marie has one older brother and one older sister.  Ms. Thomas’s sister was diagnosed with breast cancer at age 40 and had a lumpectomy. She was diagnosed with a separate ipsilateral breast cancer at age 48 and pursued a bilateral mastectomy. She had BRAC1/2 testing through [employer] and no pathogenic variants were detected by sequencing and deletion/duplication analysis ([id number]).  Ms. Marie’s sister has not had updated genetic testing.       Ms. Marie’s mother has not had cancer.  Ms. Marie’s mother had one brother and one sister, neither of whom had cancer.  The daughter of Ms. Marie’s maternal uncle had breast cancer at age 50.  Ms. Marie’s maternal grandfather  young from a gunshot wound.  Ms. Marie’s maternal grandmother  in her 90s and did not have cancer.        Ms. Marie’s father had prostate cancer in his 80s and squamous cell carcinoma skin cancer; he  in his 90s.  Ms. Marie’s father had nine brothers and sisters, none of whom had any known cancers.  Neither of Ms. Marie’s paternal grandparents had cancer.        Social History (historical data, reviewed by physician)  Denies tobacco and illicit drug use; social alcohol use.      Current Medications   exemestane 25 MG Oral Tab Take 1 tablet (25 mg total) by mouth daily. 90 tablet 1    rosuvastatin 20 MG Oral Tab Take 1 tablet (20 mg total) by mouth nightly.      Calcium 200 MG Oral Tab Take 600 Units by mouth daily.      omega-3 fatty acids 1000 MG Oral Cap Take 1,000 mg by mouth daily.      Cholecalciferol (VITAMIN D3) 1000 UNITS Oral Cap Take 1 tablet by mouth daily.      MULTI-VITAMIN OR daily       Allergies   Ms. Marie is allergic to trimethoprim.     Vital Signs   BP 99/64 (BP Location: Right arm, Patient Position: Sitting, Cuff Size: large)   Pulse 70   Temp 98.6 °F (37 °C) (Tympanic)   Resp 18   Ht 1.702 m (5' 7.01\")   Wt 77.1 kg (170 lb)   LMP  (LMP Unknown)   SpO2 99%   BMI 26.62 kg/m²     Physical Examination   Constitutional Well developed and well nourished; in no apparent distress; appears close to chronological age.  Head  Normocephalic and atraumatic.  Eyes  Conjunctiva clear; sclera anicteric.  ENMT  External nose normal; external ears normal.  Neck  Supple.  Lymphatics No cervical, supraclavicular, axillary adenopathy.  Breasts  Bilateral breasts without suspicious masses.   Respiratory Normal effort; no respiratory distress; clear to auscultation bilaterally.   Cardiovascular Regular rate and rhythm.  Abdomen Soft; not tender; no masses; no hepatosplenomegaly.  Extremities No lower extremity edema.  Neurologic Motor and sensory grossly intact.  Psychiatric Mood and affect appropriate.    Laboratory  Recent Results (from the past 168 hour(s))   BASIC METABOLIC PANEL [E]     Collection Time: 24 12:34 PM   Result Value Ref Range    Glucose 82 70 - 99 mg/dL    Sodium 143 136 - 145 mmol/L    Potassium 4.2 3.5 - 5.1 mmol/L    Chloride 109 98 - 112 mmol/L    CO2 27.0 21.0 - 32.0 mmol/L    Anion Gap 7 0 - 18 mmol/L    BUN 16 9 - 23 mg/dL    Creatinine 0.88 0.55 - 1.02 mg/dL    BUN/CREA Ratio 18.2 10.0 - 20.0    Calcium, Total 9.6 8.7 - 10.4 mg/dL    Calculated Osmolality 296 (H) 275 - 295 mOsm/kg    eGFR-Cr 75 >=60 mL/min/1.73m2    Patient Fasting for BMP? Yes      Radiology  [facility]  Department of Radiology  [address] [facility] [address]  [place] [address] [phone]      Name: Dalila Marie SETH \"Maddie\"  Ord Dr: Clara Huerta MD  : 1960    Age/Sex: 63 year old Female  Pt. Phone: [phone]  Exam Date: 10/23/2023  Procedure: MRI BREAST (W+WO) W/CAD BILAT (CPT=77049)   -----------------------------------------------------------------------------------------------------------------------------------------------                  Clara Huerta MD  [address] [place] [id number]      Interpretation   PROCEDURE:  MRI BREAST (W+WO) W/CAD BILAT (CPT=77049)     COMPARISON:  [facility] , MG, BRAVO SHAQ 2D+3D DIAGNOSTIC BRAVO  BILAT (UGY=20786/04875), 3/31/2023, 10:40 AM.  MR NEFTALI, MRI BREAST (W+WO) W/CAD BILAT (CPT=77049), 2022, 6:32 PM.     INDICATIONS:  Z17.0 Malignant neoplasm of lower-outer quadrant of left breast of female, estrogen receptor positive  C50.512 Malignant neoplasm of lower-outer quadrant of le*     63-year-old woman with personal history of breast malignancy  presents for high risk screening breast MRI.     She has a history of left breast invasive and in situ ductal carcinoma status post lumpectomy in 2021, also treated with radiation therapy and anastrozole.     TECHNIQUE:  The breasts were imaged using dynamic intravenous gadolinium infusion, thin sections, and a dedicated breast coil.   Bilateral pre-contrast axial 2D/3D T2 weighted and 3D T1 weighted VIBRANT sequences were obtained with and without fat   suppression. Post contrast VIBRANT 3D T1 weighted images after IV gadolinium were obtained. 1.6 mm slice reconstruction of 3D data sets with additional maximum intensity projects, subtraction, graphic, and kinetic analysis were performed from source   data.     PATIENT STATED HISTORY:(As transcribed by Technologist)  Patient is having surviellane scan for malignant neoplasm of left breast.      CONTRAST USED:  15 mL of Dotarem     FINDINGS:    There are scattered areas of fibroglandular tissue.  There is minimal background parenchymal enhancement.     Benign appearing post lumpectomy changes are seen in the lateral mid to posterior left breast.     There are no suspicious areas of enhancement in either breast.  There is no internal mammary or axillary adenopathy.                   =====  CONCLUSION:    Benign post lumpectomy changes of the left breast.     No MRI findings suspicious for malignancy in either breast.     Annual mammography due on or shortly after 3/31/2024.     BI-RADS CATEGORY:  DIAGNOSTIC CATEGORY 2--BENIGN FINDING:       RECOMMENDATIONS:    ANNUAL SCREENING MAMMMOGRAM IN SIX MONTHS. BILATERAL BREASTS       PLEASE NOTE:  A NORMAL MRI DOES NOT EXCLUDE THE POSSIBILITY OF BREAST CANCER.  A CLINICALLY SUSPICIOUS PALPABLE LUMP SHOULD BE BIOPSIED.  CORRELATION WITH CLINICAL EXAM AND OTHER IMAGING STUDIES IS RECOMMENDED.        LOCATION:  [facility]        Dictated by (CST): Afia Queen MD on 10/24/2023 at 3:39 PM       Finalized by (CST): Afia Queen MD on 10/24/2023 at 3:49 PM        Impression and Plan   1.   Invasive ductal carcinoma, left breast: Tumor was estrogen and progesterone receptor positive and Her2 negative. Patient is staged as IA. She has undergone lumpectomy and sentinel lymph node biopsy. OncotypeDX score was 19 and chemotherapy was not pursued. She received adjuvant  radiation therapy.          As her tumor is hormone receptor positive, adjuvant endocrine therapy is recommended for at least 5 years. Continue exemestane without modification.           Mammography as ordered by Dr. Huerta is due in 04/2024.    2.   Osteopenia: Zoledronic acid today. Bone density next due in 07/2025.    Planned Follow Up   Patient will return for follow up in 6 months.    Electronically signed by:    Monroe Mcdowell M.D.  System Medical Director of Oncology Services  [facility]

## 2024-03-04 DIAGNOSIS — C50.912 INVASIVE DUCTAL CARCINOMA OF BREAST, FEMALE, LEFT (HCC): ICD-10-CM

## 2024-03-04 DIAGNOSIS — Z79.811 AROMATASE INHIBITOR USE: ICD-10-CM

## 2024-03-04 RX ORDER — EXEMESTANE 25 MG/1
25 TABLET ORAL DAILY
Qty: 90 TABLET | Refills: 2 | Status: SHIPPED | OUTPATIENT
Start: 2024-03-04

## 2024-03-22 ENCOUNTER — TELEPHONE (OUTPATIENT)
Dept: UROLOGY | Facility: CLINIC | Age: 64
End: 2024-03-22

## 2024-03-26 ENCOUNTER — OFFICE VISIT (OUTPATIENT)
Dept: UROLOGY | Facility: CLINIC | Age: 64
End: 2024-03-26
Attending: OBSTETRICS & GYNECOLOGY
Payer: COMMERCIAL

## 2024-03-26 VITALS
BODY MASS INDEX: 25.74 KG/M2 | HEIGHT: 67 IN | DIASTOLIC BLOOD PRESSURE: 58 MMHG | WEIGHT: 164 LBS | SYSTOLIC BLOOD PRESSURE: 108 MMHG | TEMPERATURE: 98 F

## 2024-03-26 DIAGNOSIS — N81.84 PELVIC MUSCLE WASTING: ICD-10-CM

## 2024-03-26 DIAGNOSIS — R39.15 URINARY URGENCY: Primary | ICD-10-CM

## 2024-03-26 DIAGNOSIS — N95.2 POSTMENOPAUSAL ATROPHIC VAGINITIS: ICD-10-CM

## 2024-03-26 DIAGNOSIS — N39.41 URGE INCONTINENCE: ICD-10-CM

## 2024-03-26 DIAGNOSIS — M62.89 HIGH-TONE PELVIC FLOOR DYSFUNCTION: ICD-10-CM

## 2024-03-26 DIAGNOSIS — R35.1 NOCTURIA: ICD-10-CM

## 2024-03-26 LAB
BILIRUB UR QL STRIP.AUTO: NEGATIVE
CLARITY UR REFRACT.AUTO: CLEAR
COLOR UR AUTO: COLORLESS
CONTROL RUN WITHIN 24 HOURS?: YES
GLUCOSE UR STRIP.AUTO-MCNC: NORMAL MG/DL
KETONES UR STRIP.AUTO-MCNC: NEGATIVE MG/DL
LEUKOCYTE ESTERASE UR QL STRIP.AUTO: NEGATIVE
NITRITE UR QL STRIP.AUTO: NEGATIVE
NITRITE URINE: NEGATIVE
PH UR STRIP.AUTO: 7 [PH] (ref 5–8)
PROT UR STRIP.AUTO-MCNC: NEGATIVE MG/DL
RBC UR QL AUTO: NEGATIVE
SP GR UR STRIP.AUTO: <1.005 (ref 1–1.03)
UROBILINOGEN UR STRIP.AUTO-MCNC: NORMAL MG/DL

## 2024-03-26 PROCEDURE — 81002 URINALYSIS NONAUTO W/O SCOPE: CPT | Performed by: OBSTETRICS & GYNECOLOGY

## 2024-03-26 PROCEDURE — 81003 URINALYSIS AUTO W/O SCOPE: CPT | Performed by: OBSTETRICS & GYNECOLOGY

## 2024-03-26 PROCEDURE — 87086 URINE CULTURE/COLONY COUNT: CPT | Performed by: OBSTETRICS & GYNECOLOGY

## 2024-03-26 PROCEDURE — 99212 OFFICE O/P EST SF 10 MIN: CPT

## 2024-03-26 NOTE — PATIENT INSTRUCTIONS
Name Address City Phone Contact   [facility] [address] [place] [phone]    [facility] [address] [place] [phone] [facility] [address] [place] [phone]    [facility] [address] [facility] [address] [place] [phone] Yohana Levi   [facility] 2[address] [facility] [phone] Afia Karin [facility] 1130 [facility] [phone]    [facility] 500 [facility] [phone] Kira Leigh    [facility] 5 [facility] [phone] [facility] 2[address] [facility] [phone] Gail Salas   [facility] at [facility] [address] [facility] [address] [place] [phone] Sushma [facility] 220 [facility] [address] [place] [phone]    [facility] [address] [facility] [address] [place] [phone] Zoraida Le   [facility] [address] [facility] [phone] Wendi Juarez   [facility] [address] [phone] Afia Martins   [facility] 8990 [facility] [phone] Kierra Sahu Physical Therapy 676 [facility] 950 [place] [phone]    [facility] 4[address] [facility] [address] [place] [phone] Norman SCHREIBER Physical Therapy,  97[address] [facility] [address] [place] [phone] Fausto Davidson   [facility] [address] [phone] Kristen Cronin   [facility] at [facility] 200 [facility] [phone] Tonya Hill    [facility] 400 [facility] [phone]  Marguerite Polanco   [facility] [address] [place] [address] [phone] Antonia Abimaelenrico   [facility] [address] [place] [phone] Zayra Winkler   [facility] 1200 [facility] [place] [phone] Aless[facility] 536 [facility] [address] [place] [phone]    [facility] 429 [facility] [phone] Joan Avni   [facility] 111 [facility] [phone] Megan [facility] 528 [facility] [phone] [address] [phone] Natalie Mullins   [facility] [address] [place] [phone] Maritza [facility] Therapy 854 [facility] [address] Rafael Mccoy [phone]    [facility] 875 [facility] Rafael Mccoy [phone] Dorita Fuentes   [facility] [address] Junie Morales [phone] Rekha Lewis   [facility] Therapy [address] [place] [phone]    [facility] Physical Therapy [address] [place] [phone] Kristen [facility] Therapy [address] [facility] [address] [phone]    [facility] [address] [facility] [phone] Zayra Winkler   [facility] [address] [place] [phone] Meeta [facility] Therapy 1504 [facility]. [address] [place] [phone]    [facility] Services [address] [facility] [address] [place] [phone] Meeta Velasco   [facility] [address] [place]  [phone] Brinda Ga   [facility] Physical Therapy [address] [place] [phone]    [facility] Physical Therapy [address] [facility] [address] [place] [phone]    [facility] Therapy [place] 1019 [facility] [phone] Faby [facility] [address] [place] [phone] Misti Farr   [facility] [address] [place] [phone] [facility] Physical Therapy [facility] [address] [facility] [phone] [facility] Hospital[facility]   [facility] [place] [phone] Jackie Barton   [facility] Physical Therapy 640 [facility] [address] [place] [phone]    [facility] Medical [facility] Physical Therapy [address] [place] [phone]    [facility] [place] [address] [place] [phone]    [facility] [place] [address] [facility] [address] Raza [phone]    [facility] Services at [facility] [address] [facility] [address] [place] [phone] Yue Sahu Physical Therapy 1 [facility] [address] [place] [phone]    [facility] [address] [place] [phone] Mary Saldana   [facility] [address] [place] [phone] [facility] [address] [place] [phone] [facility] 1875 [facility] [address] [place] [phone]    [facility] [address] [facility] [phone] Sarah Maharaj Physical Therapy [facility] 828 [facility] [phone]    [facility] 310 [facility] [phone] Lori Laureano   [address] [phone] Mary [facility]  Group Physical Therapy [address] [place] [phone]    [facility] [place] [address], 2nd Floor [place] [phone]    [facility] 7339 [facility] [phone] [facility] Physical Therapy [facility] 301 [address] [facility] [phone] Jody [facility] 102 [facility] [address] [place] [phone]    Rehabilitation Services at [facility] [address] [facility] [place] [phone] Maren [facility]   [facility] at [facility] [address] [facility] [phone]  Joan Eid   [facility] 18[address] [facility] [phone] Ashlee Zavala   [facility] 710 [facility] [phone] Krystin Davis   [facility] Physical Therapy 330 [facility] [place] [phone] Estela Moran   [facility] [place] [address] [facility] [phone] Miracle Balderrama   [facility] [place] 2111 [facility] [place] [phone] Savannah Sevilla   [facility] [place] [address] [facility] [phone] Cheryl [facility] 67433 [facility] [phone]    [facility] [address] [facility] [phone]    [facility] [address] [place] [phone]    [facility] Physical Therapy [address] [place] [phone] Zoie Wes [facility] [address] [place] [phone] Phoebe [facility] Therapy [address] [place] [phone]    [facility] Physical Therapy 7 [facility] [address] [place] [phone] Wendie Smith   [facility] 25 N [facility]  [place] [phone] Wendie Smith   [facility] [address] [place] [phone] [facility] [place] [address] [place] [phone]

## 2024-03-26 NOTE — PROGRESS NOTES
Christel Holloway,   3/26/2024     Referred by CANDACE Conley  Pt here with self    Chief Complaint   Patient presents with    Urinary Urgency     Referred by TRISH Stock for urinary urgency and UUI    Incontinence       HPI:  No MAX  +UUI, urinary urgency  Nocturia x1-2  Double voids  Vag pressure & fullness  No prolapse sx  Not sexually active, n/a dyspareunia  Reg bowels    PRIOR TREATMENTS:    Kegels    No  UTIs  No gross hematuria    , c/sx2    Breast ca s/p lumpectomy, rad tx, & anti-e meds    Vitals:  /58   Temp 98.3 °F (36.8 °C)   Ht 67\"   Wt 164 lb (74.4 kg)   LMP  (LMP Unknown)   BMI 25.69 kg/m²      HISTORY:  Past Medical History:   Diagnosis Date    Amenorrhea     Anemia     as a child    Anesthesia complication     too much epidural with 1st  was paralyzed from jaw down    Atypical ductal hyperplasia of left breast 2015    Breast cancer (HCC)     left breast ca    Cancer (HCC)     breast (L)    Ductal carcinoma in situ of breast 2021    left breast    Esophageal reflux     H/O mammogram 2013    negative     HEADACHES     History of shingles     HYPERLIPIDEMIA     Infertility, female     Pap smear for cervical cancer screening 2012    WNL -- for years , , , , , , , , , , , ,       Past Surgical History:   Procedure Laterality Date    BIOPSY OF BREAST, INCISIONAL Left 2015    Atypical Ductal Hyperplasia      ,1999    x 2    LAPAROSCOPY,DIAGNOSTIC   and     LUMPECTOMY LEFT  2021    IDC;DCIS    BRAVO BIOPSY STEREO NODULE 2 SITE BILAT (CPT=19081/27464)  2015    atypia    BRAVO LOCALIZATION WIRE 1 SITE LEFT (CPT=19281)  2015    Atypia    NEEDLE BIOPSY LEFT  2021    MRI bx- DCIS    NEEDLE BIOPSY RIGHT  2016    BENIGN   MRI BX    OTHER ACCESSORY Left 2023    rotator cuff repair    RADIATION LEFT        Family History   Problem Relation Age of Onset    Heart  Disorder Father     Hypertension Father     Lipids Mother     Lung Disorder Mother         COPD-previou smoker    Thyroid disease Mother     Cancer Sister         breast    Breast Cancer Sister 40        two times @47 y/o    Heart Disorder Brother     Hypertension Brother     Seizure Disorder Brother     Breast Cancer Self 61    DCIS Self 61    Breast Cancer Niece 39      Social History     Socioeconomic History    Marital status:     Number of children: 2   Occupational History    Occupation: speech language pathologist   Tobacco Use    Smoking status: Never    Smokeless tobacco: Never   Vaping Use    Vaping Use: Never used   Substance and Sexual Activity    Alcohol use: No     Alcohol/week: 0.0 standard drinks of alcohol    Drug use: No    Sexual activity: Yes     Partners: Male   Other Topics Concern    Caffeine Concern No    Exercise No    Seat Belt Yes   Social History Narrative    Single, lives alone    Working full time     Has 1 son in the city, 1 dtr in [place]        Allergies:  Allergies   Allergen Reactions    Trimethoprim RASH       Medications:  Outpatient Medications Prior to Visit   Medication Sig Dispense Refill    exemestane 25 MG Oral Tab Take 1 tablet (25 mg total) by mouth daily. 90 tablet 2    rosuvastatin 20 MG Oral Tab Take 1 tablet (20 mg total) by mouth nightly.      Calcium 200 MG Oral Tab Take 600 Units by mouth daily.      omega-3 fatty acids 1000 MG Oral Cap Take 1,000 mg by mouth daily.      Cholecalciferol (VITAMIN D3) 1000 UNITS Oral Cap Take 1 tablet by mouth daily.      MULTI-VITAMIN OR daily       No facility-administered medications prior to visit.       Urogynecology Summary:  Urogynecology Summary  Prolapse: No  MAX: No  Urge Incontinence: Yes  Nocturia Frequency: 2 (1-2)  Frequency: 2 hours  Incomplete emptying: No  Constipation: No  Wears pad day?: 1  Wears Pad Night?: 0  Activities are limited by UI/POP?: No  Currently Sexually Active: No    Review of Systems:    A  comprehensive 12 point review of systems was completed.  Pertinent positives noted in the the HPI.  No CP  No SOB    GENERAL EXAM:  GENERAL:  Alert and Oriented, and NAD  HEENT:  Normal, no lesions  LUNGS:  Normal effort  HEART:  RRR  ABDOMEN: soft, no mass, no hernia  EXTREM:  Normal, no edema  SKIN:  Normal, no lesions    PELVIC EXAM:  Ext. Gen: no atrophy, no lesions  Urethra: +atrophy, nontender  Bladder:+fullness, nontender  Vagina: +atrophy  Cervix: no bleeding, no lesions, nontender  Uterus: +mobile  Adnexa:no masses, nontender  Perineum: nontender  Anus: wnl  Rectum: defer    PELVIS FLOOR NEUROMUSCULAR FUNCTION:  Strength:  1, Unable to hold greater than 3 sec, Increased tone, Unable to relax, and Tender  Perineal Sensation:  Normal      PELVIC SUPPORT:  [place]:  0  Ant:  0  Post:  0  CST:  negative  UVJ: not hypermobile    Impression/Plan:    ICD-10-CM    1. Urinary urgency  R39.15 POCT urinalysis dipstick[12038]     Urinalysis, Routine     Urine Culture, Routine     PHYSICAL THERAPY - External Location      2. Urge incontinence  N39.41 POCT urinalysis dipstick[66709]     Urinalysis, Routine     Urine Culture, Routine     PHYSICAL THERAPY - External Location      3. Nocturia  R35.1 POCT urinalysis dipstick[[id number]]     Urinalysis, Routine     Urine Culture, Routine     PHYSICAL THERAPY - External Location      4. Pelvic muscle wasting  N81.84 PHYSICAL THERAPY - External Location      5. Postmenopausal atrophic vaginitis  N95.2       6. High-tone pelvic floor dysfunction  N94.89 PHYSICAL THERAPY - External Location          Discussion Items:   Urodynamics and cystoscopy for evaluation of LUTS  Behavioral and pharmacologic treatments for OAB  Pelvic muscle rehabilitation including pelvic floor PT  Topical estrogen therapy for treating UGA  Discussed dietary and behavioral modification, discussed pharmacologic and nonpharmacologic mgmt options for urinary symptoms.     Discussed vag moisturizers  Discussed  PFPT    Consider UDS testing if sx persist despite PFPT    Discussed mgmt of vulvovaginal atrophy with vaginal estrogen cream. Reviewed associated benefits, risks, alternatives, and goals. Recommend low dose twice weekly mgmt upon completion of anti-e meds (with onc approval)    Diagnostic Items:  Urine testing    Medications Discussed:  Vag moisturizers    Treatment Plan, Non-surgical:   RN teaching/pt education done  Vag moisturizers, PFPT    Treatment Plan, Surgical:   None    Pt verbalizes understanding of all above discussed information. All questions answered. She agrees to plan    Return in about 3 months (around 6/26/2024) for PT f/u.    Christel Holloway DO, FACOG, FACS      Discussion undertaken in English, info provided

## 2024-03-29 ENCOUNTER — HOSPITAL ENCOUNTER (OUTPATIENT)
Dept: MAMMOGRAPHY | Facility: HOSPITAL | Age: 64
Discharge: HOME | End: 2024-03-29
Attending: SURGERY
Payer: COMMERCIAL

## 2024-03-29 DIAGNOSIS — Z17.0 CARCINOMA OF LOWER-OUTER QUADRANT OF LEFT BREAST IN FEMALE, ESTROGEN RECEPTOR POSITIVE (HCC): ICD-10-CM

## 2024-03-29 DIAGNOSIS — C50.512 CARCINOMA OF LOWER-OUTER QUADRANT OF LEFT BREAST IN FEMALE, ESTROGEN RECEPTOR POSITIVE (HCC): ICD-10-CM

## 2024-03-29 PROCEDURE — 77062 BREAST TOMOSYNTHESIS BI: CPT | Performed by: SURGERY

## 2024-03-29 PROCEDURE — 77066 DX MAMMO INCL CAD BI: CPT | Performed by: SURGERY

## 2024-04-23 ENCOUNTER — TELEMEDICINE (OUTPATIENT)
Dept: INTERNAL MEDICINE CLINIC | Facility: CLINIC | Age: 64
End: 2024-04-23
Payer: COMMERCIAL

## 2024-04-23 DIAGNOSIS — E66.3 OVERWEIGHT (BMI 25.0-29.9): ICD-10-CM

## 2024-04-23 DIAGNOSIS — E78.5 HYPERLIPIDEMIA, UNSPECIFIED HYPERLIPIDEMIA TYPE: ICD-10-CM

## 2024-04-23 DIAGNOSIS — Z51.81 ENCOUNTER FOR THERAPEUTIC DRUG LEVEL MONITORING: Primary | ICD-10-CM

## 2024-04-23 DIAGNOSIS — R73.09 ELEVATED GLUCOSE: ICD-10-CM

## 2024-04-23 RX ORDER — METFORMIN HYDROCHLORIDE 500 MG/1
500 TABLET, EXTENDED RELEASE ORAL DAILY
Qty: 90 TABLET | Refills: 0 | Status: SHIPPED | OUTPATIENT
Start: 2024-04-23

## 2024-04-23 NOTE — PATIENT INSTRUCTIONS
1200 calories a day    Moderate Carb (30/35/35)  120g  Protein    62g  Fats    140g  Carbs    Lower Carb (40/40/20)  160g  Protein    71g  Fats    80g  Carbs    We are here to support you with weight loss, but please remember that you still need your primary care provider for your routine health maintenance.      PLAN:  Begin metformin  Follow up with me in 3 months  Schedule follow up appointments: Des Cabrera (dietitian) or Marta Blackwell (presurgery dietitian)   Check for insurance coverage for dietitian and labwork prior to scheduling appointment.     Please try to work on the following dietary changes:  Goals: Aim for 20-30 grams of protein/ meal  Eat 4-6 vegetables/day  Avoid skipping meals- eat every 4-5 hours  Aim for 3 meals/day  2. Drink lots of water and cut down on soda/juice consumption if soda/juice drinker  3. Focus on protein: (15-30 grams with each meal) ie. greek yogurt, cottage cheese, string cheese, hard boiled eggs  4. Healthy snacks: peanut butter and apples, hummus and carrots, berries, nuts (1/4 cup), tuna and crackers                 Protein Shakes: Premier protein or Core Power                Protein Bars: Rx Bars, Oatmega, Power Crunch                 Sargento balanced breaks (cheese and nuts)- without chocolate  5. Reduce carbohydrates which includes sweets as well as rice, pasta, potatoes, bread, corn and instead choose whole grain options or more protein or vegetables (4-6 servings of vegetables per day)  6. Get a good night of sleep  7. Try to decrease stress in life     Please download apps:  1. My Fitness Pal, Lose it, My Net Diary marnie to help you to monitor daily dietary intake and you will be able to see if you are eating the right amount of calories, protein, carbs                With My Fitness Pal-->When you set-up the marnie or need to adjust settings:                Goals should include:                 Lose 1.5-2 lbs per week                Activity level: not very active (can't  count exercise towards calorie number per day)                   ** Daily INPUT> Look at nutrition section-- \"nutrients\" and it will break down your macros for the day (ie. Protein, carbs, fibers, sugars and fats). Try to stay within these numbers daily     2. \"7 minute workout\" to help with exercise/activity which takes 7 minutes of your day and that you can do at home!   3. \"Calm\" or \"Headspace\" which helps with mindfulness, meditation, clarity, sleep, and rigoberto to your daily life.   4. [place] [employer] blog for healthy recipe ideas  5. Curverider for low carb resources    HIGH PROTEIN SNACK IDEAS  -cottage cheese  -plain yogurt  -kefir  -hard-boiled eggs  -natural cheeses  -nuts (measure portion size)   -unsweetened nut butters  -dried edamame   -tariq seeds soaked in water or almond milk  -soy nuts  -cured meats (monitor for sodium issues)   -hummus with vegetables  -bean dip with vegetables     FRUIT  Low carb fruit options   Raspberries: Half a cup (60 grams) contains 3 grams of carbs.  Blackberries: Half a cup (70 grams) contains 4 grams of carbs.  Strawberries: Half a cup (100 grams) contains 6 grams of carbs.  Blueberries: Half a cup (50 grams) contains 6 grams of carbs.  Plum: One medium-sized (80 grams) contains 6 grams of carbs.     VEGETABLES  Low carb vegetables            Delicious and Healthy Snacks      All snacks are under 200 calories and chocked full of nutrition!  Quick Caprese salad- Mix 1 cup grape tomatoes, 1 oz. fresh mini mozzarella balls, 1 teaspoon olive oil, ½ tsp. balsamic vinegar.  Add fresh or dried basil for flavor.  Cottage cheese and berries- Top ½ cup low fat cottage cheese with 1/2 cup of blueberries  Peanut butter and apple slices- Cut up an apple and dip in 1 Tablespoon of peanut butter  Hummus and veggies- Dip baby carrots, pepper strips or snap peas in ¼ cup of hummus  Nuts- Munch on 1 ounce of any kind of nut (about ¼ cup)  Wrap it up- Wrap 2 ounces of low sodium, nitrate free  turkey around red pepper or cucumber slices  Yogurt and berries- 1/2 cup berries on a 6 ounce container of plain or low sugar fruit flavored 2% Greek yogurt (less than 15 grams sugar per serving).  Chobani (Less Sugar)® or Siggis® are examples.  Hardboiled egg and fruit- 1 hardboiled egg and a tangerine or other small serving of fruit  Tuna and avocado- Put 2 oz tuna (one pouch) on 1/3 of an avocado or 2 Tbsp guacamole and top with salsa  String cheese and veggies- one piece cheese (3/4 ounce) and 1 cup snap peas or other vegetables  Cauliflower rice and cheese- Sprinkle 1/4 cup shredded cheese on 1 cup cauliflower rice and microwave until cheese melts  Deviled eggs- 3 deviled egg halves  Bean dip and veggies-½ cup refried beans with ¼ cup shredded cheese melted on top. Use as a dip for celery or red peppers strips or just eat plain  Sargento Balanced Breaks® (or make your own-1/2 ounce nuts, 1/2 ounce cheese and 1 teaspoon dried fruit)  Edamame-1 cup warmed and lightly salted  Low fat chicken, egg, or tuna salad- Mix 2 oz chicken, tuna, or 2 eggs with 1 tsp flowers,1 tsp Ward mustard and 1 tsp plain yogurt.  Add chopped celery, onions, walnuts, Granny smith apples or dried cranberries to make it interesting.  [place] break- Turkey, chicken, peanut butter or almond butter on 1 slice whole grain bread   Protein shake-Anglin®, Core Power®, Orgain®, Premier Protein®  Protein bar-Quest ®, Oatmega®, RX Bar®    Patient Instructions   RESOURCES   Atomic Habits -by Sameer Loaiza  The hungry brain: the science behind why we overeat    PODCASTS    School Podcasts by Marie Richey   10 Percent Happier   Losing 100 lbs with Corinne   Brain over Binge by Wendi Castellano  Dishing Up Nutrition    Patient Resources:    Personal Training/Fitness Classes/Health Coaching    [facility] [place]: Full fitness center with group fitness and personal training located in [place].  Health Coaching with Dee Dee  Kilo Nixon, and Flakito Hanley at our [facility]- individual coaching to work on your health goals. Call [phone] and/or email @ [email or web address]. Free 60 minute consult when client of [facility].  ClaudiaFIT [place] @ http://www.Oxlo Systems. A variety of group fitness options plus various yoga classes [phone] and/or email Savannah at savannah@[employer]  [facility] Fitness Centers with multiple locations: Titansan (www.Flywheel), Indiewalls5 Training (www.SensiGen), WSO2 Body Bootcamp (www.Vusion), Giggem (www.Firework), The Exercise  (www.exercisecoach.com), Club PilNeuro Kinetics (www.Offerboxx.Qpyn)    Online Fitness  Fitness  on Gaopeng  Fit in 10 DVD series   www.CryoLife  Chair exercises via Sit and Be Fit (www.sitAntenna Software.Lean Train) and X2TV (www.McAfee.com) or Franki Vargas or Mello Greco videos on YouTube.  Hip Hop Fit with Oneal Ramirez at www.hiphopfit.codetag    Apps for on the Go Fitness  ModeWalk 7 Minute Workout (orange box with white 7) - free on the go HIIT training maryann  Peloton Maryann @ www.onepeloton.com    Nutrition Trackers and Programs  LoseIT! And My Fitness Pal apps and on line for tracking nutrition  NOOM - virtual health coaching  FitFoundation (healthy meals on the go) in [place] @ www.ozkzfaamuskhm4c.Qpyn  Shereen ARNETT @ www.bistromd.Qpyn and Shrtzw98 (calorie smart and low carb plans recommended) @ www.pblxvw97.com, Metabolic Meals @ www.MyMetabolicMeals.com - individual prepared meals to go  Gobble, Blue Apron, Home , Every Plate, Sunbasket- on line meal delivery programs for preparation at home  Meal Village in [place] for homemade meals to go @ www.mealvillage.Qpyn  Diet Doctor @ www.dietdoctor.com - low carb swaps  Yucecilia - meal prep and planning maryann (www.yummly.com)    Stress, Anxiety, Depression, Trauma  CALM meditation maryann  (www.calm.com)  Headspace  Don't let anxiety run your life. Using the science of emotion regulation and mindfulness to overcome fear and worry by Rafa Lux PsyD and Tl Poon MA.  The [employer]em[employer] Podcast (September 27, 2023): 6 Magic Words That Stop Anxiety  What Happened to You?- a look at the impact trauma has on behavior written by Kirit James and Dr. Yazan Wright  Whole Again by Shaquille Mendez - discovering your true self after trauma    Mindful Eating/The Hungry Brain  Am I Hungry? Mindful eating virtual  marnie (www.amihungry.com)  The Hungry Brain by Laura Disla, PhD  Mindless Eating by Angus Mina  Weight Loss Surgery Will Not Treat Food Addiction by Lou Rolle Ph.D    Metabolic Dysfunction, Hormones and Cravings  Why We Get Sick? By Rl Raza (insulin resistance)  Your Body in Balance: The New Science of Food, Hormones, and Health by Dr. Brayden Gustafson  The Complete Guide to fasting by Dr. Camejo  Fast Like a Girl by Dr. Francheska Teixeira  The Menopause Reset by Dr. Francheska Teixeira  Sugar, Salt & Fat by Anastasiya Zafar, Ph.D, R.D.  The Truth About Sugar - documentary on sugar (Free on T[employer] Reseach, https://youQ1Mediau.be/4W1tleoRI4s)  Reverse Visceral Fat: #1 Way to Increase Your Lifespan & End Inflammation with Dr. Adalberto Morris on Utube @ https://Nimbus LLC.be/nupPRnvUpJY?si=dg2plzGdKFV0VedC    Nutrition Support  You Are What You Eat - Netfix series on twin study looking at impact of nutrition changes on health  The End of Dieting: How to Live for Life by Dr. Aamir Lopez M.D. or listen to The TaposÃ©Â© Podcast Episode 63: Understanding \"Nutritarian\" Eating w/Dr. Aamir Lopez  The Game Changers- Netflix Documentary on plant based nutrition  The Dr. Kim  Wellness Plan by Dr. Carlos Kim MD  The Complete Guide to fasting by Dr. Camejo  @[facility] (Instagram Dietician with support surrounding nutrition and meal prep/planning)    Education, Motivation and Support Resources  Live to 100: Secrets  of the Blue Zones - Netflix series on the secrets to communities living over 100 years old  Atomic Habits by Sameer Loaiza (a book about taking small steps to promote greater behavior change)   Motivation marnie (black box with white \")- daily supportive messages sent to your phone  Can't Hurt Me by Rafa Rao (a book exploring the power of discipline in achieving your goals)  Fed Up - documentary about obesity (Free on Utube)  Www.yourweightmatters.org - Obesity Action Coalition sponsored Blog posts  Obesity Action Coalition Resources on topics specific to weight management (www.obesityaction.org)  Fitlosophy Fitspiration - journal to better health (journal book found at [employer] in fitness aisle)  Tayo Gregg talk titled: The Call to Courage (Netflix)  The Exam Room by the Physician's Committee (Podcast)  Nutrition Facts by Dr. Carrington (Podcast)

## 2024-04-23 NOTE — PROGRESS NOTES
[facility] WEIGHT MANAGEMENT VIRTUAL VIDEO ENCOUNTER     Dalila Marie verbally consents to a Virtual/Telephone Check-In service on 04/23/24  Patient understands and accepts financial responsibility for any deductible, co-insurance and/or co-pays associated with this service.    HISTORY OF PRESENT ILLNESS  Chief Complaint   Patient presents with    Weight Problem     Dalila Marie is a 63 year old female new patient, is being evaluated as a video visit using Telemedicine with live, interactive video and audio. Dalila Marie for initiation of medical weight loss program.  Patient presents today with c/o excess weight. 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       Reviewed C patient contract: Readiness for Lifestyle change: 10/10, Interest in Medication: 10/10, Bariatric surgery interest: 0/10    Weight  Starting weight: 170lbs - currently 162lbs after 8 weeks of WW      Wt Readings from Last 6 Encounters:   03/26/24 164 lb (74.4 kg)   02/08/24 170 lb (77.1 kg)   12/26/23 172 lb (78 kg)   11/22/23 171 lb (77.6 kg)   08/09/23 169 lb (76.7 kg)   08/03/23 169 lb (76.7 kg)        Typical diet   Breakfast Lunch Dinner Snacks Fluids   20 oz H2O, 8 oz skim mild, 1 c whole grain cereal, 1 banana  12 oz unsweetened tea, 1 green salad with 4 oz chicken and balsamic vinegarette, mixed berries, 4 crackers  1 cup chicken caccitore, 1/2 cup brown rice, broccolini, 20 oz water   8 oz hot tea unsweatened        Social hx and lifestyle reviewed:    Work: speech pathologist  Marital status: Partner  Support: yes  Tobacco use: none  ETOH use:   none  Supplements: calcium, Vitamin D3, fish oil   Exercise: RTC surgery last summer, very active, likes to garden, no exercise routine, started yoga this week  Stress level: 7/10  Sleep hours and integrity:  7 hours per night    MEDICAL HISTORY  PMH reviewed:   Cardiac disorders:negative   Depression/anxiety: negative  Glaucoma: negative  Kidney stones: negative  Eating disorder: negative  Migraines/seizures: negative  Joint-related conditions:negative  Liver disease: negative  Thyroid disease: negative  Constipation: negative  Diabetes: negative  Sleep Apnea hx: n/a   Cancer hx: negative  DVT: negative  Family or personal history of Pancreatic issues / Medullary Thyroid Cancer: Dad - thyroid cancer  History of bariatric surgery: negative    FMH reviewed: obesity in parent/s or sibling:     REVIEW OF SYSTEMS  GENERAL: feels well otherwise, and negative fatigue   SKIN: denies any rashes to skin folds   HEENT: denies neck thickening  LUNGS: denies shortness of breath with exertion, no apnea  CARDIOVASCULAR: denies chest pain on exertion, denies palpitations or pedal edema  GI: denies abdominal pain, distention, No N/V/D/C  MUSCULOSKELETAL: denies back pain, joint pains   NEURO: denies headaches or dizziness  ENDOCRINE: denies any excess hunger, urination or thirst, denies any purple striae  PSYCH: denies change in behavior or mood, denies feeling sad or depressed    EXAM  LMP  (LMP Unknown) , Percent body fat: unable to complete (will perform in office)   Reviewed recent set of vitals       GENERAL: well developed, well nourished, in no apparent distress, speaking in full sentences comfortably   SKIN: warm, pink, dry without rashes to exposed area   EYES: conjunctiva pink  HEENT: atraumatic, normocephalic  LUNGS: normal work of breathing, non labored  CARDIO: normal work, no exertion  EXTREMITIES: no cyanosis, no clubbing, no edema  NEURO: Oriented times three  PSYCH: pleasant, cooperative, normal mood and  affect    Lab Results   Component Value Date    GLU 82 02/03/2024    BUN 16 02/03/2024    BUNCREA 18.2 02/03/2024    CREATSERUM 0.88 02/03/2024    ANIONGAP 7 02/03/2024     06/27/2015    GFRNAA 75 02/10/2022    GFRAA 87 02/10/2022    CA 9.6 02/03/2024    OSMOCALC 296 (H) 02/03/2024    ALKPHO 40 (L) 08/09/2022    AST 16 08/09/2022    ALT 27 08/09/2022    BILT 0.3 08/09/2022    TP 7.3 08/09/2022    ALB 3.5 08/09/2022    GLOBULIN 3.8 08/09/2022    AGRATIO 0.9 (L) 06/13/2013     02/03/2024    K 4.2 02/03/2024     02/03/2024    CO2 27.0 02/03/2024     Lab Results   Component Value Date     06/14/2021    A1C 5.6 06/14/2021     Lab Results   Component Value Date    CHOLEST 169.00 06/14/2021    TRIG 76.00 06/14/2021    HDL 62.9 06/14/2021    LDL 91 06/14/2021    VLDL 15 06/14/2021     No results found for: \"B12\", \"VITB12\"  Lab Results   Component Value Date    VITD 37.1 09/14/2021       Current Outpatient Medications on File Prior to Visit   Medication Sig Dispense Refill    exemestane 25 MG Oral Tab Take 1 tablet (25 mg total) by mouth daily. 90 tablet 2    rosuvastatin 20 MG Oral Tab Take 1 tablet (20 mg total) by mouth nightly.      Calcium 200 MG Oral Tab Take 600 Units by mouth daily.      omega-3 fatty acids 1000 MG Oral Cap Take 1,000 mg by mouth daily.      Cholecalciferol (VITAMIN D3) 1000 UNITS Oral Cap Take 1 tablet by mouth daily.      MULTI-VITAMIN OR daily       No current facility-administered medications on file prior to visit.       ASSESSMENT/PLAN    ICD-10-CM    1. Encounter for therapeutic drug level monitoring  Z51.81       2. Overweight (BMI 25.0-29.9)  E66.3       3. Hyperlipidemia, unspecified hyperlipidemia type  E78.5 [facility]etician Referral  (C USE ONLY)      4. Elevated glucose  R73.09         Initial Weight Data and Goal Weight Loss:     Initial consult:  lbs on /2021, Down  Lb:  lbs total     PLAN   Initial Weight: 162lbs  Initial Weight Date: 4/23/24  Today's Weight:  162lbs  5% Goal: 8.1lbs  10% Goal: 16.2lbs  Total Weight Loss:     Baseline labs reviewed  Will start medications: metformin, discussed MOA, advised side effects and adverse effects of medication  --advised of side effects and adverse effects of this medication  Contradictions: stimulant, caution GLP-1  Body composition will be done in the office   HLD - stable on current medication rosuvastatin, will continue, will continue to work on lifestyle modifications  Begin logging foods - discussed macronutrient goals  -low carb high protein  -portion control  -Limit carbohydrates  -Eat breakfast every day   -Don't skip meals   -Read nutrition labels and keep a food log  -drink a lot of water 65 oz of water per day  - Do not drink your calories (no soda, juice, high calorie coffee drinks, limit alcohol)  - Do not eat late at night  Decrease carbs, increase protein, no skipping meals   Needs to incorporate exercise regimen FITTE: ACSM recommendations (150-300 minutes/ week in active weight loss)   Follow up with dietitian and psychologist as recommended.  Discussed the role of sleep and stress in weight management.  Counseled on comprehensive weight loss plan including attention to nutrition, exercise and behavior/stress management for success. See patient instruction below for more details.    Total time spent on chart review, pre-charting, obtaining history, counseling, and educating, reviewing labs was 45 minutes.       Patient Instructions   1200 calories a day    Moderate Carb (30/35/35)  120g  Protein    62g  Fats    140g  Carbs    Lower Carb (40/40/20)  160g  Protein    71g  Fats    80g  Carbs    We are here to support you with weight loss, but please remember that you still need your primary care provider for your routine health maintenance.      PLAN:  Begin metformin  Follow up with me in 3 months  Schedule follow up appointments: Des Cabrera (dietitian) or Marta Blackwell (presurgery dietitian)   Check for insurance  coverage for dietitian and labwork prior to scheduling appointment.     Please try to work on the following dietary changes:  Goals: Aim for 20-30 grams of protein/ meal  Eat 4-6 vegetables/day  Avoid skipping meals- eat every 4-5 hours  Aim for 3 meals/day  2. Drink lots of water and cut down on soda/juice consumption if soda/juice drinker  3. Focus on protein: (15-30 grams with each meal) ie. greek yogurt, cottage cheese, string cheese, hard boiled eggs  4. Healthy snacks: peanut butter and apples, hummus and carrots, berries, nuts (1/4 cup), tuna and crackers                 Protein Shakes: Premier protein or Core Power                Protein Bars: Rx Bars, Oatmega, Power Crunch                 Sargento balanced breaks (cheese and nuts)- without chocolate  5. Reduce carbohydrates which includes sweets as well as rice, pasta, potatoes, bread, corn and instead choose whole grain options or more protein or vegetables (4-6 servings of vegetables per day)  6. Get a good night of sleep  7. Try to decrease stress in life     Please download apps:  1. My Fitness Pal, Lose it, My Net Diary marnie to help you to monitor daily dietary intake and you will be able to see if you are eating the right amount of calories, protein, carbs                With My Fitness Pal-->When you set-up the marnie or need to adjust settings:                Goals should include:                 Lose 1.5-2 lbs per week                Activity level: not very active (can't count exercise towards calorie number per day)                   ** Daily INPUT> Look at nutrition section-- \"nutrients\" and it will break down your macros for the day (ie. Protein, carbs, fibers, sugars and fats). Try to stay within these numbers daily     2. \"7 minute workout\" to help with exercise/activity which takes 7 minutes of your day and that you can do at home!   3. \"Calm\" or \"Headspace\" which helps with mindfulness, meditation, clarity, sleep, and rigoberto to your daily life.    4. Skinnytaste blog for healthy recipe ideas  5. DietVerticalResponse for low carb resources    HIGH PROTEIN SNACK IDEAS  -cottage cheese  -plain yogurt  -kefir  -hard-boiled eggs  -natural cheeses  -nuts (measure portion size)   -unsweetened nut butters  -dried edamame   -tariq seeds soaked in water or almond milk  -soy nuts  -cured meats (monitor for sodium issues)   -hummus with vegetables  -bean dip with vegetables     FRUIT  Low carb fruit options   Raspberries: Half a cup (60 grams) contains 3 grams of carbs.  Blackberries: Half a cup (70 grams) contains 4 grams of carbs.  Strawberries: Half a cup (100 grams) contains 6 grams of carbs.  Blueberries: Half a cup (50 grams) contains 6 grams of carbs.  Plum: One medium-sized (80 grams) contains 6 grams of carbs.     VEGETABLES  Low carb vegetables            Delicious and Healthy Snacks      All snacks are under 200 calories and chocked full of nutrition!  Quick Caprese salad- Mix 1 cup grape tomatoes, 1 oz. fresh mini mozzarella balls, 1 teaspoon olive oil, ½ tsp. balsamic vinegar.  Add fresh or dried basil for flavor.  Cottage cheese and berries- Top ½ cup low fat cottage cheese with 1/2 cup of blueberries  Peanut butter and apple slices- Cut up an apple and dip in 1 Tablespoon of peanut butter  Hummus and veggies- Dip baby carrots, pepper strips or snap peas in ¼ cup of hummus  Nuts- Munch on 1 ounce of any kind of nut (about ¼ cup)  Wrap it up- Wrap 2 ounces of low sodium, nitrate free turkey around red pepper or cucumber slices  Yogurt and berries- 1/2 cup berries on a 6 ounce container of plain or low sugar fruit flavored 2% Greek yogurt (less than 15 grams sugar per serving).  Chobani (Less Sugar)® or Siggis® are examples.  Hardboiled egg and fruit- 1 hardboiled egg and a tangerine or other small serving of fruit  Tuna and avocado- Put 2 oz tuna (one pouch) on 1/3 of an avocado or 2 Tbsp guacamole and top with salsa  String cheese and veggies- one piece cheese  (3/4 ounce) and 1 cup snap peas or other vegetables  Cauliflower rice and cheese- Sprinkle 1/4 cup shredded cheese on 1 cup cauliflower rice and microwave until cheese melts  Deviled eggs- 3 deviled egg halves  Bean dip and veggies-½ cup refried beans with ¼ cup shredded cheese melted on top. Use as a dip for celery or red peppers strips or just eat plain  Sargento Balanced Breaks® (or make your own-1/2 ounce nuts, 1/2 ounce cheese and 1 teaspoon dried fruit)  Edamame-1 cup warmed and lightly salted  Low fat chicken, egg, or tuna salad- Mix 2 oz chicken, tuna, or 2 eggs with 1 tsp flowers,1 tsp Ward mustard and 1 tsp plain yogurt.  Add chopped celery, onions, walnuts, Granny smith apples or dried cranberries to make it interesting.  Marblemount break- Turkey, chicken, peanut butter or almond butter on 1 slice whole grain bread   Protein shake-Anglin®, Core Power®, Orgain®, Premier Protein®  Protein bar-Quest ®, Oatmega®, RX Bar®    Patient Instructions   RESOURCES   Atomic Habits -by Sameer Loaiza  The hungry brain: the science behind why we overeat    PODCASTS    School Podcasts by Marie Richey   10 Percent Happier   Losing 100 lbs with Corinne   Brain over Binge by Wendi Castellano  Dishing Up Nutrition    Patient Resources:    Personal Training/Fitness Classes/Health Coaching    [facility] in [place]: Full fitness center with group fitness and personal training located in [place].  Health Coaching with Dee Dee Nixon, Kilo Aldrich, and Flakito Hanley at our [facility]- individual coaching to work on your health goals. Call [phone] and/or email @ [email or web address]. Free 60 minute consult when client of [facility] [employer].  NIKHIL Raza @ http://www.Be At One. A variety of group fitness options plus various yoga classes [phone] and/or email Savannah at [email or web address]  [facility] Fitness Centers with multiple locations: Orange  Theory Fitness (www.Blueprint Software Systems.Vita Coco), F45 Training (www.c24fhlgsbqp.Vita Coco), Fit Body Bootcamp (www.fitbodybootcamp.Vita Coco), IngBoo Fitness (www.Bee There.Vita Coco), The Exercise  (www.exercisecoach.com), Club Pilates (www.clubpilates.Vita Coco)    Online Fitness  Fitness  on Utube  Fit in 10 DVD series   www.xxggy53XIUIntiza  Chair exercises via Sit and Be Fit (www.sitandbefit.org) and Theravance (www.Youtuo.com) or Franki Vargas or Mello Greco videos on YouTube.  Hip Hop Fit with Oneal Ellingtonks at www.hiphopfit.[employer] Technologies    Apps for on the Go Fitness  Silver Peak Systems 7 Minute Workout (orange box with white 7) - free on the go HIIT training maryann  Peloton Maryann @ www.onepeloton.com    Nutrition Trackers and Programs  LoseIT! And My Fitness Pal apps and on line for tracking nutrition  NOOM - virtual health coaching  FitFoundation (healthy meals on the go) in [place] @ www.txukptlzncwau6c.Vita Coco  Bistro MD @ wwwTradeobistrEnergy Microd.Vita Coco and Ewwjoz33 (calorie smart and low carb plans recommended) @ www.mhmhzc85.com, Metabolic Meals @ www.MyMetabolicMeals.com - individual prepared meals to go  Gobble, Blue Apron, Home , Every Plate, Sunbasket- on line meal delivery programs for preparation at home  Meal [facility] in [place] for homemade meals to go @ www.mealvillage.com  Diet Doctor @ www.dietdoctor.com - low carb swaps  Yummly - meal prep and planning maryann (www.yummly.com)    Stress, Anxiety, Depression, Trauma  CALM meditation maryann (www.calm.com)  Headspace  Don't let anxiety run your life. Using the science of emotion regulation and mindfulness to overcome fear and worry by Rafa Lux PsyD and Tl Poon MA.  The Nubefy Podcast (September 27, 2023): 6 Magic Words That Stop Anxiety  What Happened to You?- a look at the impact trauma has on behavior written by Kirit James and Dr. Yazan Wright  Whole Again by Shaquille Mendez - discovering your true self after trauma    Mindful Eating/The Hungry Brain  Am I  Hungry? Mindful eating virtual  marnie (www.amihungry.com)  The Hungry Brain by Laura Disla, PhD  Mindless Eating by Angus Mina  Weight Loss Surgery Will Not Treat Food Addiction by Lou Rolle Ph.D    Metabolic Dysfunction, Hormones and Cravings  Why We Get Sick? By Rl Raza (insulin resistance)  Your Body in Balance: The New Science of Food, Hormones, and Health by Dr. Brayden Gustafson  The Complete Guide to fasting by Dr. Cameoj  Fast Like a Girl by Dr. Francheska Teixeira  The Menopause Reset by Dr. Francheska Teixeira  Sugar, Salt & Fat by Anastasiya Zafar, Ph.D, R.D.  The Truth About Sugar - documentary on sugar (Free on Trinean, https://youPeerbyu.be/9D9krjxFS0x)  Reverse Visceral Fat: #1 Way to Increase Your Lifespan & End Inflammation with Dr. Adalberto Morris on Utube @ https://MakeMeReach.be/nupPRnvUpJY?si=[email or web address]    Nutrition Support  You Are What You Eat - Netfix series on twin study looking at impact of nutrition changes on health  The End of Dieting: How to Live for Life by Dr. Aamir Lopez M.D. or listen to The Kids Note Podcast Episode 63: Understanding \"Nutritarian\" Eating w/Dr. Aamir Lopez  The Game Changers- Netflix Documentary on plant based nutrition  The Dr. Kmi T5 Wellness Plan by Dr. Carlos Kim MD  The Complete Guide to fasting by Dr. Camejo  [email or web address] ([facility] Dietician with support surrounding nutrition and meal prep/planning)    Education, Motivation and Support Resources  Live to 100: Secrets of the Blue Zones - Netflix series on the secrets to communities living over 100 years old  Atomic Habits by Sameer Loaiza (a book about taking small steps to promote greater behavior change)   Motivation marnie (black box with white \")- daily supportive messages sent to your phone  Can't Hurt Me by Rafa Rao (a book exploring the power of discipline in achieving your goals)  Fed Up - documentary about obesity (Free on Utube)  Www.yourweightmatters.org - Obesity Action Coalition sponsored Blog  posts  Obesity Action Coalition Resources on topics specific to weight management (www.obesityaction.org)  Fitlosophy Fitspiration - journal to better health (journal book found at [employer] in fitness aisle)  Tayo Gregg talk titled: The Call to Courage (Netflix)  The Exam Room by the Physician's Committee (Podcast)  Nutrition Facts by Dr. Carrington (Podcast)        No follow-ups on file.    Patient verbalizes understanding.    Pt understands phone/video evaluation is not a substitute for face to face examination or emergency care. Pt advised to go to the ER or call 911 for worsening symptoms or acute distress.       Please note that the following visit was completed using two-way, real-time interactive audio and/or video communication.  This has been done in good roscoe to provide continuity of care in the best interest of the provider-patient relationship, due to the ongoing public health crisis/national emergency and because of restrictions of visitation.  There are limitations of this visit as no physical exam could be performed.  Every conscious effort was taken to allow for sufficient and adequate time.  This billing was spent on reviewing labs, medications, radiology tests and decision making.  Appropriate medical decision-making and tests are ordered as detailed in the plan of care above.     Kaitlynn Maynard PA-C

## 2024-06-03 ENCOUNTER — TELEPHONE (OUTPATIENT)
Dept: PHYSICAL THERAPY | Facility: HOSPITAL | Age: 64
End: 2024-06-03

## 2024-06-05 ENCOUNTER — OFFICE VISIT (OUTPATIENT)
Dept: PHYSICAL THERAPY | Facility: HOSPITAL | Age: 64
End: 2024-06-05
Attending: OBSTETRICS & GYNECOLOGY
Payer: COMMERCIAL

## 2024-06-05 DIAGNOSIS — N81.84 PELVIC MUSCLE WASTING: ICD-10-CM

## 2024-06-05 DIAGNOSIS — N39.41 URGE INCONTINENCE: ICD-10-CM

## 2024-06-05 DIAGNOSIS — R35.1 NOCTURIA: ICD-10-CM

## 2024-06-05 DIAGNOSIS — M62.89 HIGH-TONE PELVIC FLOOR DYSFUNCTION: ICD-10-CM

## 2024-06-05 DIAGNOSIS — R39.15 URINARY URGENCY: Primary | ICD-10-CM

## 2024-06-05 PROCEDURE — 97162 PT EVAL MOD COMPLEX 30 MIN: CPT

## 2024-06-05 PROCEDURE — 97112 NEUROMUSCULAR REEDUCATION: CPT

## 2024-06-05 NOTE — PROGRESS NOTES
MUSCULOSKELETAL AND PELVIC FLOOR EVALUATION:     Diagnosis:   Urinary urgency (R39.15)  Urge incontinence (N39.41)  Nocturia (R35.1)  Pelvic muscle wasting (N81.84)  High-tone pelvic floor dysfunction (M62.89)      Referring Provider: Christel Holloway  Date of Evaluation:    6/5/2024    Precautions:   History of breast cancer Next MD visit:   none scheduled  Date of Surgery: n/a     PATIENT SUMMARY   Dalila Marie is a 63 year old female  who presents to therapy today with complaints of vaginal pain and overactive bladder symptoms. She does have a history of breast cancer, so she takes estrogen blockers, which she believes is likely contributing to her symptoms. She is not currently sexually active, so she did not realize that she had vaginal pain until a recent gynecological exam. She has also been having low back pain, which she thought was her tailbone due to most consistent symptoms occurring with prolonged sitting. After a max of 1.5 hours of sitting, she starts to get radiating pain down her legs (L>R) and feels she must stand up to alleviate the pain. Walking/being active consistently helps to minimize the back pain. Since she is not able to go on an estrogen supplement, she started using a vaginal moisturizer (Replens), which has helped with the pain. In addition to back/pelvic pain, she also experiences OAB symptoms (which began at the same time as the pain onset). She feels that she is constantly thinking about urinating now. She is drinking a lot of water currently (~48-60 oz) and reports urinating 1-2x/night and 6-8x/day. She feels that she has gotten good at ignoring the urge to go, but she still finds it annoying and inconvenient to feel urinary urgency constantly. She rarely experiences incontinence (~1x/month), but when it does occur, it's always in association with urgency. Reports being very busy at her job and experiencing a fair amount of stress. She is off for the Summer currently.      Current symptoms include: back pain, vaginal pain, and urgency/frequency    Pt describes pain level: current 3/10, best 0/10, worst 8/10.   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 Pt goals include to alleviate the pain and urinary urgency.  Past medical history was reviewed with Dalila. She has a past medical history of Amenorrhea, Anemia, Anesthesia complication, Atypical ductal hyperplasia of left breast (2015), Breast cancer (HCC), Cancer (HCC), Ductal carcinoma in situ of breast (2021), Esophageal reflux, H/O mammogram (2013), HEADACHES, History of shingles (), HYPERLIPIDEMIA, Infertility, female, and Pap smear for cervical cancer screening (2012). Lumpectomy with radiation.       URINARY HABITS  Types of symptoms: urge incontinence and other: constant urgency  Events associated with the onset of urinary complaints: insidious  Abdominal/Vaginal Pressure complaints: no  Urinary Frequency: every 2-3 hours  Urine Stream: WNL  Amount: diminished to WNL  Leaking occurs: with urgency, rarely  Episodes of Leakage: 1 times per month  Amount of leakage: minimal  Pad use: yes  Pad Change frequency: 1 per day max   Nocturia: 1-2x  Fluid Intake: Good  Bladder irritants: NA  Urine Stop test: NA  Post void dribble: No  Hovering: No  Empty bladder just in case: Yes  Do you ever leak urine without knowing it? No    BOWEL HABITS  Types of symptoms: other WNL    Frequency of bowel movements: WNL  Stool consistency: Whitley Stool Scale: 4  Do you strain with defecation: No   Laxative use: No    SEXUAL HEALTH STATUS  Tres  Scale: NA  History of Sexual Abuse: NA  Sexual Haliimaile Status: Not currently active  Pain with initial and/or deep penetration: Yes  Pain with pelvic exam/tampon use: Yes    ASSESSMENT  Dalila presents to physical therapy evaluation with primary c/o pelvic pain and urinary urgency. The results of the objective tests and measures show altered postural mechanics, decreased hip ROM and flexibility, suboptimal LE strength, and suspected PFM dysfunction (to be confirmed in future session with internal assessment).  Functional deficits include but are not limited to impaired ability to sit for >1.5 hours or tolerate gynecological exam due to pain.  Signs and symptoms are consistent with diagnosis of pelvic pain and OAB due to suspected overactive PFM. Pt and PT discussed evaluation findings, pathology, POC and HEP.  Pt voiced understanding and performs HEP correctly without reported pain. Skilled Pelvic Physical Therapy is medically necessary to address the above impairments and reach functional goals.    OBJECTIVE:   Posture: weight shifted to R in sitting with increased weight through RUE  Pelvic Alignment: NT  Deep Tendon Reflexes: NT  Gait: pt ambulates on level ground with normal mechanics.     External Palpation: unremarkable  Abdominal Wall Integrity: mildly increased tone in BLQ of abdomen      Range Of Motion  Lumbar AROM screen: NT  LE AROM screen: grossly WNL except: decreased IR on L, impaired knee flexion ROM SHANTAL    Strength (MMT) 5/5 SHANTAL LE except 3+/5 hip abductors/extensors  Transverse Abdominis: 3+/5    Flexibility Summary: WNL SHANTAL LE except mod deficits in hamstring length B, adductors B    Special Tests  ASLR (-)    Informed consent for internal pelvic evaluation given: Yes, but not today due to time constraints      Today's Treatment and Response:   Patient education was provided on objective findings of external and internal evaluation and expectations with treatment outcomes. Educated on pelvic  anatomy and function with diagrams and pelvic model, bladder normatives, adequate hydration levels, and diaphragmatic breathing for PNS activation and pelvic floor relaxation     Neuro: 40'  Provided education on PFM anatomy and physiology. Discussed psychosocial aspects of pelvic pain and utility of down-training PFM via diaphragmatic breathing and lumbopelvic stretching exercise. Cued in diaphragmatic breathing in various positions with emphasis on pelvic floor elongation/relaxation. Also discussed bladder health considerations including avoiding \"JIC\" urination, utilizing distraction techniques and performing PFM relaxation to mitigate urgency and restore normal urine voiding intervals of 2.5-3 hours.   Cued in:  Supine DB x 10 breaths (today)  Child's pose with DB x 10 breaths (today)  SL DB x 10 breaths (as part of HEP)        Charges: PT Eval Moderate Complexity, neuro x 3      Total Timed Treatment: 40 min     Total Treatment Time: 56 min     Based on clinical rationale and outcome measures, this evaluation involved Moderate Complexity decision making due to 1-2 personal factors/comorbidities, 3 body structures involved/activity limitations, and evolving symptoms including urge urinary incontinence and pelvic pain  PLAN OF CARE:    Goals: (to be met in 10 visits)  Patient will demonstrate optimal PFM lengthening with coordinated breathing x 10 reps to alleviate PFM pain and muscle tension.  Patient will demonstrate normalized tone and minimal pain onset (</= 2/10) with internal assessment for increased tolerance to gynecological exam.   Patient will demonstrate hamstring/adductor muscle length WNL to alleviate tension in support structures of pelvic floor musculature.   Patient will report urination intervals of 2-3 hours indicating improved bladder health and function.   Patient will report adherence to HEP for continued exercise benefits following cessation of PT.       Frequency / Duration: Patient will be  seen for 1-2 x/week or a total of 10 visits over a 90 day period.  Treatment will include: Manual Therapy, Neuromuscular Re-education, Self-Care Home Management, Therapeutic Activities, Therapeutic Exercise, and Home Exercise Program instruction     Education or treatment limitation: None  Rehab Potential:good      Patient/Family/Caregiver was advised of these findings, precautions, and treatment options and has agreed to actively participate in planning and for this course of care.    Thank you for your referral. Please co-sign or sign and return this letter via fax as soon as possible to [phone]. If you have any questions, please contact me at Dept: [phone]    Sincerely,  Electronically signed by therapist: Gifty Borja PT  Physician's certification required: Yes  I certify the need for these services furnished under this plan of treatment and while under my care.    X___________________________________________________ Date____________________    Certification From: 6/5/2024  To:9/3/2024

## 2024-06-12 ENCOUNTER — OFFICE VISIT (OUTPATIENT)
Dept: PHYSICAL THERAPY | Facility: HOSPITAL | Age: 64
End: 2024-06-12
Attending: INTERNAL MEDICINE
Payer: COMMERCIAL

## 2024-06-12 PROCEDURE — 97112 NEUROMUSCULAR REEDUCATION: CPT

## 2024-06-12 PROCEDURE — 97140 MANUAL THERAPY 1/> REGIONS: CPT

## 2024-06-12 NOTE — PROGRESS NOTES
Diagnosis:   Urinary urgency (R39.15)  Urge incontinence (N39.41)  Nocturia (R35.1)  Pelvic muscle wasting (N81.84)  High-tone pelvic floor dysfunction (M62.89)        Referring Provider: No ref. provider found  Date of Evaluation:    24    Precautions:   Hx of breast cancer Next MD visit:   none scheduled  Date of Surgery: n/a   Insurance Primary/Secondary: BCBS POS / N/A     # Auth Visits: 10            Subjective: Reports near constant pressure and discomfort in the pelvic region. She did have two episodes of UI this past week. One occurred when she got home from a road trip.     Pain: 6/10      Objective:   Internal vaginal assessment :  PERF: 3/3/5//10  Lengthening: impaired but improved with VC  Bearing down: WNL    Mod myofascial restriction and TTP (burning sensation) in R compressor urethrae, PC, IC  Mild to mod myofascial restriction in middle and deep ms layers on L side but no TTP    Abdominal assessment: mild to moderate myofascial restriction in mons pubis and along  scar       Assessment: Internal assessment today revealed myofascial restriction and tenderness in R pelvic musculature; however, symptoms did resolve mostly with sustained pressure and cues for DB. Pt with initial difficulty lengthening PFM, but performance did improve with cues. Cued in elevators with emphasis on controlled descent and added to HEP. Plan to continue with DB exercise next session.       Goals: (to be met in 10 visits)  Patient will demonstrate optimal PFM lengthening with coordinated breathing x 10 reps to alleviate PFM pain and muscle tension.  Patient will demonstrate normalized tone and minimal pain onset (</= 2/10) with internal assessment for increased tolerance to gynecological exam.   Patient will demonstrate hamstring/adductor muscle length WNL to alleviate tension in support structures of pelvic floor musculature.   Patient will report urination intervals of 2-3 hours indicating improved bladder  health and function.   Patient will report adherence to HEP for continued exercise benefits following cessation of PT.     Plan: Continue with abdominal myofascial work, DB in various positions, generalized down-training. Internal checks every 2-3 sessions.   Date: 6/12/2024  TX#: 2/10 Date:                 TX#: 3/ Date:                 TX#: 4/ Date:                 TX#: 5/ Date:   Tx#: 6/   Neuro: 12'  Discussed nocturia symptoms including likely contributing factors to nightly urination    Discussed findings/implications of internal assessment            Manual: 32'  Internal vaginal assessment performed and cues provided on proper PFM contraction, relaxation, and bearing down. Cued in proper endurance and fast-twitch contractions, as well as elevator contractions. Assessed cough reflex and KNACK contraction.     Lower abdominal myofascial work                        HEP: elevators with emphasis on descent  Supine DB x 10 breaths   Child's pose with DB x 10 breaths   SL DB x 10 breaths     Charges: manual x 2, neuro x 1       Total Timed Treatment: 44 min  Total Treatment Time: 44 min

## 2024-06-19 ENCOUNTER — OFFICE VISIT (OUTPATIENT)
Dept: PHYSICAL THERAPY | Facility: HOSPITAL | Age: 64
End: 2024-06-19
Attending: INTERNAL MEDICINE

## 2024-06-19 PROCEDURE — 97112 NEUROMUSCULAR REEDUCATION: CPT

## 2024-06-19 NOTE — PROGRESS NOTES
Diagnosis:   Urinary urgency (R39.15)  Urge incontinence (N39.41)  Nocturia (R35.1)  Pelvic muscle wasting (N81.84)  High-tone pelvic floor dysfunction (M62.89)        Referring Provider: No ref. provider found  Date of Evaluation:    24    Precautions:   Hx of breast cancer Next MD visit:   none scheduled  Date of Surgery: n/a   Insurance Primary/Secondary: BCBS POS / N/A     # Auth Visits: 10            Subjective: Reports that she feels much less pressure, but she has had two episodes of loss of bladder control in the last week. They have both occurred as she was entering her house from outside.     Pain: 4/10      Objective:   Internal vaginal assessment :  PERF: 3/3/5//10  Lengthening: impaired but improved with VC  Bearing down: WNL    Mod myofascial restriction and TTP (burning sensation) in R compressor urethrae, PC, IC  Mild to mod myofascial restriction in middle and deep ms layers on L side but no TTP    Abdominal assessment: mild to moderate myofascial restriction in mons pubis and along  scar       Assessment: Patient has experienced two episodes of UI upon entering her home after yard work this past week. Discussed common bladder triggers that elicit urgency and cause UI (in this case, entering her home). Reviewed strategies for mitigating urgency prior to entering her home including quick flicks, DB, distraction techniques, sitting down, etc. Encouraged her to prioritize this practice as her main homework this week; she verbalized understanding.       Goals: (to be met in 10 visits)  Patient will demonstrate optimal PFM lengthening with coordinated breathing x 10 reps to alleviate PFM pain and muscle tension.  Patient will demonstrate normalized tone and minimal pain onset (</= 2/10) with internal assessment for increased tolerance to gynecological exam.   Patient will demonstrate hamstring/adductor muscle length WNL to alleviate tension in support structures of pelvic floor  musculature.   Patient will report urination intervals of 2-3 hours indicating improved bladder health and function.   Patient will report adherence to HEP for continued exercise benefits following cessation of PT.     Plan: Continue with abdominal myofascial work, DB in various positions, generalized down-training. Internal checks every 2-3 sessions. F/u on urge deferment as she walks in the door.   Date: 6/12/2024  TX#: 2/10 Date:  6/19/24              TX#: 3/10 Date:                 TX#: 4/ Date:                 TX#: 5/ Date:   Tx#: 6/   Neuro: 12'  Discussed nocturia symptoms including likely contributing factors to nightly urination    Discussed findings/implications of internal assessment      Neuro: 43'  Discussed bladder triggers that elicit urgency and cause UI, reviewed strategies for mitigating urgency including quick flicks, DB, distraction techniques, sitting down, etc.     Supine DB with cues for slow inhale and hold x 5 mins    Supine quick flicks 3 x 5    SL DB x 4 mins    SL open books x 10 B    Supine SB DKTC with DB x 20    Supine SB rotations x 20            Manual: 32'  Internal vaginal assessment performed and cues provided on proper PFM contraction, relaxation, and bearing down. Cued in proper endurance and fast-twitch contractions, as well as elevator contractions. Assessed cough reflex and KNACK contraction.     Lower abdominal myofascial work                        HEP: elevators with emphasis on descent  Supine DB x 10 breaths   Child's pose with DB x 10 breaths   SL DB x 10 breaths     Charges: neuro x 3       Total Timed Treatment: 43 min  Total Treatment Time: 45 min

## 2024-06-25 ENCOUNTER — VIRTUAL PHONE E/M (OUTPATIENT)
Dept: UROLOGY | Facility: CLINIC | Age: 64
End: 2024-06-25
Attending: OBSTETRICS & GYNECOLOGY

## 2024-06-25 DIAGNOSIS — N81.84 PELVIC MUSCLE WASTING: ICD-10-CM

## 2024-06-25 DIAGNOSIS — R35.1 NOCTURIA: ICD-10-CM

## 2024-06-25 DIAGNOSIS — N39.41 URGE INCONTINENCE: ICD-10-CM

## 2024-06-25 DIAGNOSIS — R39.15 URINARY URGENCY: Primary | ICD-10-CM

## 2024-06-25 DIAGNOSIS — M62.89 HIGH-TONE PELVIC FLOOR DYSFUNCTION: ICD-10-CM

## 2024-06-25 NOTE — PROGRESS NOTES
Patient to follow up pelvic floor sx  Given circumstances surrounding COVID-19 this visit is being conducted as a televisit with pt's consent.  Pt in safe, private environment for televisit, provider located in the office setting    She is currently using [facility]   Weekly sessions    Min improvement  Has learned a lot    Working on strategies    8 total sessions booked  No s/sx of UTI    Bowels reg    Breast ca s/p lumpectomy, rad tx, & anti-e meds     Impression/Plan:    ICD-10-CM    1. Urinary urgency  R39.15       2. Urge incontinence  N39.41       3. High-tone pelvic floor dysfunction  M62.89       4. Nocturia  R35.1       5. Pelvic muscle wasting  N81.84           Discussion Items:   Discussed dietary and behavioral modifications for mgmt of urinary symptoms  Discussed weight management and benefits of weight loss on urinary symptoms  Reviewed AUA/SUFU guidelines on mgmt of non-neurogenic OAB  Discussed pharmacologic and nonpharmacologic mgmt options of urinary symptoms - reviewed risks, benefits, alternatives, and goals of treatment  Discussed specific risks related to OAB meds including, but not limited to dry mouth, constipation, blurry vision, cognitive changes, and BP elevation.      All questions answered  She understands and agrees to plan    Return in about 2 months (around 8/25/2024) for PT f/u.    Christel Holloway, , FACOG, FACS    The 21st Century Cures Act makes medical notes like these available to patients in the interest of transparency. However, be advised this is a medical document. It is intended as peer to peer communication. It is written in medical language and may contain abbreviations or verbiage that are unfamiliar. It may appear blunt or direct. Medical documents are intended to carry relevant information, facts as evident, and the clinical opinion of the practitioner.

## 2024-06-25 NOTE — PATIENT INSTRUCTIONS
[email or web address]    [email or web address]

## 2024-06-26 ENCOUNTER — OFFICE VISIT (OUTPATIENT)
Dept: PHYSICAL THERAPY | Facility: HOSPITAL | Age: 64
End: 2024-06-26
Attending: INTERNAL MEDICINE

## 2024-06-26 PROCEDURE — 97112 NEUROMUSCULAR REEDUCATION: CPT

## 2024-06-26 PROCEDURE — 97140 MANUAL THERAPY 1/> REGIONS: CPT

## 2024-06-26 NOTE — PROGRESS NOTES
Diagnosis:   Urinary urgency (R39.15)  Urge incontinence (N39.41)  Nocturia (R35.1)  Pelvic muscle wasting (N81.84)  High-tone pelvic floor dysfunction (M62.89)        Referring Provider: No ref. provider found  Date of Evaluation:    24    Precautions:   Hx of breast cancer Next MD visit:   none scheduled  Date of Surgery: n/a   Insurance Primary/Secondary: BCBS POS / N/A     # Auth Visits: 10            Subjective: Reports that she has been trying some of the strategies talked about last session. She has been trying the quick flicks and distracting herself as she walks inside her house, and she has been able to successfully make it to the restroom without leaking.     Pain: 3/10      Objective:   Internal vaginal assessment :  PERF: 3/3/5//10  Lengthening: impaired but improved with VC  Bearing down: WNL    Mod myofascial restriction and TTP (burning sensation) in R compressor urethrae, PC, IC  Mild to mod myofascial restriction in middle and deep ms layers on L side but no TTP    Abdominal assessment: mild to moderate myofascial restriction in mons pubis and along  scar       Assessment: Performed myofascial work on abdominal wall today to help alleviate tension/reflexive guarding of abdominal wall musculature over bladder, which likely contributes to feelings of urgency and pelvic pressure. Pt presented with heightened urgency at the start of the session today, but she was successfully able to wait until the end to urinate, indicating improvement in use of urge deferment strategies. Pt continues to make steady progress towards LTGs.       Goals: (to be met in 10 visits)  Patient will demonstrate optimal PFM lengthening with coordinated breathing x 10 reps to alleviate PFM pain and muscle tension.  Patient will demonstrate normalized tone and minimal pain onset (</= 2/10) with internal assessment for increased tolerance to gynecological exam.   Patient will demonstrate hamstring/adductor muscle  length WNL to alleviate tension in support structures of pelvic floor musculature.   Patient will report urination intervals of 2-3 hours indicating improved bladder health and function.   Patient will report adherence to HEP for continued exercise benefits following cessation of PT.     Plan: Continue with abdominal myofascial work, DB in various positions, generalized down-training. Internal reassessment.  Date: 6/12/2024  TX#: 2/10 Date:  6/19/24              TX#: 3/10 Date:  6/26/24               TX#: 4/10 Date:                 TX#: 5/ Date:   Tx#: 6/   Neuro: 12'  Discussed nocturia symptoms including likely contributing factors to nightly urination    Discussed findings/implications of internal assessment      Neuro: 43'  Discussed bladder triggers that elicit urgency and cause UI, reviewed strategies for mitigating urgency including quick flicks, DB, distraction techniques, sitting down, etc.     Supine DB with cues for slow inhale and hold x 5 mins    Supine quick flicks 3 x 5    SL DB x 4 mins    SL open books x 10 B    Supine SB DKTC with DB x 20    Supine SB rotations x 20       Neuro: 12'  Supine SB DKTC x 4 mins    SL open books x 10 B    Standing thread the needles x 10 B     Manual: 32'  Internal vaginal assessment performed and cues provided on proper PFM contraction, relaxation, and bearing down. Cued in proper endurance and fast-twitch contractions, as well as elevator contractions. Assessed cough reflex and KNACK contraction.     Lower abdominal myofascial work     Manual: 26'  Myofascial release to abdominal wall, BLQ>BUQ                   HEP: elevators with emphasis on descent  Supine DB x 10 breaths   Child's pose with DB x 10 breaths   SL DB x 10 breaths     Charges: neuro x 1, manual x 2       Total Timed Treatment: 38 min  Total Treatment Time: 42 min

## 2024-07-01 ENCOUNTER — OFFICE VISIT (OUTPATIENT)
Dept: PHYSICAL THERAPY | Facility: HOSPITAL | Age: 64
End: 2024-07-01
Attending: INTERNAL MEDICINE
Payer: COMMERCIAL

## 2024-07-01 PROCEDURE — 97140 MANUAL THERAPY 1/> REGIONS: CPT

## 2024-07-01 NOTE — PROGRESS NOTES
Diagnosis:   Urinary urgency (R39.15)  Urge incontinence (N39.41)  Nocturia (R35.1)  Pelvic muscle wasting (N81.84)  High-tone pelvic floor dysfunction (M62.89)        Referring Provider: No ref. provider found  Date of Evaluation:    24    Precautions:   Hx of breast cancer Next MD visit:   none scheduled  Date of Surgery: n/a   Insurance Primary/Secondary: BCBS POS / N/A     # Auth Visits: 10            Subjective: Reports that symptoms have been a little better, and she's had no incontinence. She even was able to ride on a bumpy  without experiencing any leakage. She has noticed a decrease in pelvic pressure.    Pain: 2/10      Objective:   Internal vaginal assessment :  PERF: 3/3/5//10  Lengthening: impaired but improved with VC  Bearing down: WNL    Mod myofascial restriction and TTP (burning sensation) in R compressor urethrae, PC, IC  Mild to mod myofascial restriction in middle and deep ms layers on L side but no TTP    Abdominal assessment: mild to moderate myofascial restriction in mons pubis and along  scar   Internal reassessment : residual mild to moderate myofascial restriction on L>R, no TTP today  Continued impaired lengthening ability       Assessment: Internal reassessment today revealed residual mild tissue restrictions but no tenderness with palpation. Pt with continued difficulty lengthening her PFM, but performance does improve somewhat with cues. Gradual improvement in symptoms suggests favorable response to interventions, so encouraged pt to continue with HEP and bladder retraining. Progress as tolerated.       Goals: (to be met in 10 visits)  Patient will demonstrate optimal PFM lengthening with coordinated breathing x 10 reps to alleviate PFM pain and muscle tension.  Patient will demonstrate normalized tone and minimal pain onset (</= 2/10) with internal assessment for increased tolerance to gynecological exam.   Patient will demonstrate hamstring/adductor  muscle length WNL to alleviate tension in support structures of pelvic floor musculature.   Patient will report urination intervals of 2-3 hours indicating improved bladder health and function.   Patient will report adherence to HEP for continued exercise benefits following cessation of PT.     Plan: Continue with abdominal myofascial work, DB in various positions, generalized down-training.   Date: 6/12/2024  TX#: 2/10 Date:  6/19/24              TX#: 3/10 Date:  6/26/24               TX#: 4/10 Date: 7/1/24                TX#: 5/10 Date:   Tx#: 6/   Neuro: 12'  Discussed nocturia symptoms including likely contributing factors to nightly urination    Discussed findings/implications of internal assessment      Neuro: 43'  Discussed bladder triggers that elicit urgency and cause UI, reviewed strategies for mitigating urgency including quick flicks, DB, distraction techniques, sitting down, etc.     Supine DB with cues for slow inhale and hold x 5 mins    Supine quick flicks 3 x 5    SL DB x 4 mins    SL open books x 10 B    Supine SB DKTC with DB x 20    Supine SB rotations x 20       Neuro: 12'  Supine SB DKTC x 4 mins    SL open books x 10 B    Standing thread the needles x 10 B Manual: 42'  Internal reassessment followed by myofascial release on L>R; cues provided for PFM lengthening with DB    Abdominal wall myofascial work      Manual: 32'  Internal vaginal assessment performed and cues provided on proper PFM contraction, relaxation, and bearing down. Cued in proper endurance and fast-twitch contractions, as well as elevator contractions. Assessed cough reflex and KNACK contraction.     Lower abdominal myofascial work     Manual: 26'  Myofascial release to abdominal wall, BLQ>BUQ                   HEP: elevators with emphasis on descent  Supine DB x 10 breaths   Child's pose with DB x 10 breaths   SL DB x 10 breaths     Charges: manual x 3       Total Timed Treatment: 42 min  Total Treatment Time: 44 min

## 2024-07-03 ENCOUNTER — APPOINTMENT (OUTPATIENT)
Dept: PHYSICAL THERAPY | Facility: HOSPITAL | Age: 64
End: 2024-07-03
Payer: COMMERCIAL

## 2024-07-03 ENCOUNTER — OFFICE VISIT (OUTPATIENT)
Dept: SURGERY | Facility: CLINIC | Age: 64
End: 2024-07-03
Payer: COMMERCIAL

## 2024-07-03 VITALS
RESPIRATION RATE: 18 BRPM | BODY MASS INDEX: 24 KG/M2 | TEMPERATURE: 98 F | OXYGEN SATURATION: 97 % | HEART RATE: 78 BPM | DIASTOLIC BLOOD PRESSURE: 68 MMHG | WEIGHT: 156 LBS | SYSTOLIC BLOOD PRESSURE: 108 MMHG

## 2024-07-03 DIAGNOSIS — Z17.0 MALIGNANT NEOPLASM OF LOWER-OUTER QUADRANT OF LEFT BREAST OF FEMALE, ESTROGEN RECEPTOR POSITIVE (HCC): Primary | ICD-10-CM

## 2024-07-03 DIAGNOSIS — C50.512 MALIGNANT NEOPLASM OF LOWER-OUTER QUADRANT OF LEFT BREAST OF FEMALE, ESTROGEN RECEPTOR POSITIVE (HCC): Primary | ICD-10-CM

## 2024-07-03 DIAGNOSIS — Z12.31 SCREENING MAMMOGRAM, ENCOUNTER FOR: ICD-10-CM

## 2024-07-03 PROCEDURE — 3078F DIAST BP <80 MM HG: CPT | Performed by: SURGERY

## 2024-07-03 PROCEDURE — 3074F SYST BP LT 130 MM HG: CPT | Performed by: SURGERY

## 2024-07-03 PROCEDURE — 99213 OFFICE O/P EST LOW 20 MIN: CPT | Performed by: SURGERY

## 2024-07-03 NOTE — PROGRESS NOTES
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     Stage: Cancer Staging  Invasive ductal carcinoma of breast, female, left (HCC)  Staging form: Breast, AJCC 8th Edition  - Pathologic: No stage assigned - Unsigned     Disease Status:  Surgical treatment complete, Oncotype demonstrated no benefit for chemotherapy, on exemestane, and radiation therapy completed 11/23/2021.     History:   This 62 year old woman presented with imaging detected microcalcifications.  She says she is doing well. She denies any new breast symptoms.  She denies lumps. She denies any nipple discharge. She denies any skin changes. She denies any lymphadenopathy. She denies any unitentional weight loss.  She denies any bone pain.  She denies any SOB. Mood, weight, appetite and energy are stable.      The patient underwent Bilateral Screening Tomosynthesis at [facility] on May 18, 2015 where she was noted to have no new findings on the Right but a new cluster of calcifications at 3:00 on the Left. On May 28, 2015 her Diagnostic imaging confirmed the calcifications to be heterogeneous for which stereotactic biopsy was recommended. Biopsy was performed at [place] on Lin 3, 2015 and pathology showed Atypical Ductal Hyperplasia and Flat Epithelial Atypia. She met with Dr. Mcdowell and declined Tamoxifen for concern of side effects. She had a breast MRI on June 5, 2019 that showed  a 7 mm indeterminate enhancing focus 3:00 position right breast. A focal right breast ultrasound done on Lin 10, 2019 showed no correlate.  Since her last visit her screening mammogram in December 2020 was unremarkable.  She had a surveillance MRI for high risk surveillance on July 14, 2021 that confirms a new 6 mm mass at 5:00 in the left breast for which  biopsy was recommended and took place on 2021. She was confirmed to have invasive ductal carcinoma, grade 1 associated with LCIS, %, NE 5%, HER-2/reyna negative, Ki-67 3%.  She elected for breast conserving therapy which took place without complication. She had a bilateral diagnostic mammogram on 2024 which showed no suspicious findings.  She denies any new clinical concerns related to her breast bilaterally and is attending shoulder therapy for her left upper extremity.  She is here for recommendations regarding further management.         Past Medical History:    Amenorrhea    Anemia    as a child    Anesthesia complication    too much epidural with 1st  was paralyzed from jaw down    Atypical ductal hyperplasia of left breast    Breast cancer (HCC)    left breast ca    Cancer (HCC)    breast (L)    Ductal carcinoma in situ of breast    left breast    Esophageal reflux    H/O mammogram    negative     HEADACHES    History of shingles    HYPERLIPIDEMIA    Infertility, female    Pap smear for cervical cancer screening    WNL -- for years , , , , , , , , , , , ,        Past Surgical History:   Procedure Laterality Date    Biopsy of breast, incisional Left 2015    Atypical Ductal Hyperplasia      ,1999    x 2    Laparoscopy,diagnostic   and     Lumpectomy left  2021    IDC;DCIS    Kasey biopsy stereo nodule 2 site bilat (cpt=19081/73666)  2015    atypia    Kasey localization wire 1 site left (cpt=19281)  2015    Atypia    Needle biopsy left  2021    MRI bx- DCIS    Needle biopsy right  2016    BENIGN   MRI BX    Other accessory Left 2023    rotator cuff repair    Radiation left         Gynecological History:  Pt is a   Pt was 35 years old at time of first pregnancy.    She achieved menarche at age 14 and LMP No LMP recorded (lmp unknown). Patient is postmenopausal.  She has a history of  oral contraceptive use for 2 years, last many years ago.  She has recieved infertility treatment to achieve pregnancy.    Medications:     metFORMIN  MG Oral Tablet 24 Hr Take 1 tablet (500 mg total) by mouth daily. 90 tablet 0    exemestane 25 MG Oral Tab Take 1 tablet (25 mg total) by mouth daily. 90 tablet 2    rosuvastatin 20 MG Oral Tab Take 1 tablet (20 mg total) by mouth nightly.      Calcium 200 MG Oral Tab Take 600 Units by mouth daily.      omega-3 fatty acids 1000 MG Oral Cap Take 1,000 mg by mouth daily.      Cholecalciferol (VITAMIN D3) 1000 UNITS Oral Cap Take 1 tablet by mouth daily.      MULTI-VITAMIN OR daily         Allergies:    Allergies   Allergen Reactions    Trimethoprim RASH       Family History:   Family History   Problem Relation Age of Onset    Heart Disorder Father     Hypertension Father     Lipids Mother     Lung Disorder Mother         COPD-previou smoker    Thyroid disease Mother     Cancer Sister         breast    Breast Cancer Sister 40        two times @49 y/o    Heart Disorder Brother     Hypertension Brother     Seizure Disorder Brother     Breast Cancer Self 61    DCIS Self 61    Breast Cancer Niece 39       She is not of [employer] ancestry.    Social History:  History   Alcohol Use No         History   Smoking Status    Never   Smokeless Tobacco    Never         Review of Systems:  General:   The patient denies, fever, chills, night sweats, fatigue, generalized weakness, change in appetite or weight loss.    HEENT:     The patient denies eye irritation, cataracts, redness, glaucoma, yellowing of the eyes, change in vision, color blindness, or wearing contacts/glasses. The patient denies hearing loss, ringing in the ears, ear drainage, earaches, nasal congestion, nose bleeds, snoring, pain in mouth/throat, hoarseness, change in voice, facial trauma.    Respiratory:  The patient denies chronic cough, phlegm, hemoptysis, pleurisy/chest pain, pneumonia, asthma,  wheezing, difficulty in breathing with exertion, emphysema, chronic bronchitis, shortness of breath or abnormal sound when breathing.     Cardiovascular:  There is no history of chest pain, chest pressure/discomfort, palpitations, irregular heartbeat, fainting or near-fainting, difficulty breathing when lying flat, SOB/Coughing at night, swelling of the legs or chest pain while walking.    Breasts:  See history of present illness    Gastrointestinal:     There is no history of difficulty or pain with swallowing, reflux symptoms, vomiting, dark or bloody stools, constipation, yellowing of the skin, indigestion, nausea, change in bowel habits, diarrhea, abdominal pain or vomiting blood.     Genitourinary:  The patient denies frequent urination, needing to get up at night to urinate, urinary hesitancy or retaining urine, painful urination, urinary incontinence, decreased urine stream, blood in the urine or vaginal/penile discharge.    Skin:    The patient denies rash, itching, skin lesions, dry skin, change in skin color or change in moles.     Hematologic/Lymphatic:  The patient denies easily bruising or bleeding or persistent swollen glands or lymph nodes.     Musculoskeletal:  The patient denies muscle aches/pain, joint pain, stiff joints, neck pain, back pain or bone pain.    Neuropsychiatric:  There is no history of migraines or severe headaches, seizure/epilepsy, speech problems, coordination problems, trembling/tremors, fainting/black outs, dizziness, memory problems, loss of sensation/numbness, problems walking, weakness, tingling or burning in hands/feet. There is no history of abusive relationship, bipolar disorder, sleep disturbance, anxiety, depression or feeling of despair.    Endocrine:    There is no history of poor/slow wound healing, weight loss/gain, fertility or hormone problems, cold intolerance, thyroid disease.     Allergic/Immunologic:  There is no history of hives, hay fever, angioedema or  anaphylaxis.    /68 (BP Location: Right arm, Patient Position: Sitting, Cuff Size: adult)   Pulse 78   Temp 97.8 °F (36.6 °C) (Temporal)   Resp 18   Wt 70.8 kg (156 lb)   LMP  (LMP Unknown)   SpO2 97%   BMI 24.43 kg/m²     Physical Examination:  This is a well-nourished, alert and oriented woman. There is not palpable cervical, supraclavicular or axillary adenopathy.  The neck is supple with a midline trachea and no thyromegaly. Range of motion is good at both shoulders aside from recent left shoulder surgery which limits evaluation today. Breasts are symmetrical. The tumorectomy site is in the lower outer left  breast and shows no evidence of new or recurrent disease. Both nipples are everted with no discharge. There is not dominant masses, focal nodularity or skin changes on either side. The abdomen is soft, flat and nontender, with no palpable masses or organomegaly.     Impression:   Ms. Dalila Marie is a 63 year old woman s/p Left breast excisional biopsy for left breast ADH/FEA as well as a family history of breast cancer with recent left breast MRI enhancement and biopsy confirmed left breast cancer status post lumpectomy and sentinel lymph node biopsy.     Discussion and Plan:  I had a discussion with the Patient regarding her breast exam. On exam today, she is healing well since her surgery with no signs of new or recurrent disease.  I personally reviewed the recent imaging which is concordant with her clinical exam today.  She continues on exemestane under the direction of medical oncology and tolerated radiation without sequelae.  For high risk protocol we will plan for MRI in October 2024 and a bilateral diagnostic mammogram in March 2025.  I have advised she return for clinical exam at that time. MRI will be part of her integrated imaging plan given the initial underestimation of this disease on mammogram alone.  I encouraged her to continue monitoring her ROM and strength and  explained that a referral to physical therapy may be warranted in the future if she identifies any limitations or restrictions. She was encouraged to contact the office with any questions or concerns prior to her next scheduled appointment.     This encounter lasted a total of 25 minutes, more than 50% of which was dedicated to the discussion of management options.

## 2024-07-10 ENCOUNTER — APPOINTMENT (OUTPATIENT)
Dept: PHYSICAL THERAPY | Facility: HOSPITAL | Age: 64
End: 2024-07-10
Payer: COMMERCIAL

## 2024-07-17 ENCOUNTER — OFFICE VISIT (OUTPATIENT)
Dept: PHYSICAL THERAPY | Facility: HOSPITAL | Age: 64
End: 2024-07-17
Attending: OBSTETRICS & GYNECOLOGY
Payer: COMMERCIAL

## 2024-07-17 PROCEDURE — 97140 MANUAL THERAPY 1/> REGIONS: CPT

## 2024-07-17 NOTE — PROGRESS NOTES
Diagnosis:   Urinary urgency (R39.15)  Urge incontinence (N39.41)  Nocturia (R35.1)  Pelvic muscle wasting (N81.84)  High-tone pelvic floor dysfunction (M62.89)        Referring Provider: No ref. provider found  Date of Evaluation:    24    Precautions:   Hx of breast cancer Next MD visit:   none scheduled  Date of Surgery: n/a   Insurance Primary/Secondary: BCBS POS / N/A     # Auth Visits: 10            Subjective: Reports abdominal pressure and discomfort daily, but the urinary symptoms have otherwise resolved. Reports having a lot of knee pain and limited ROM in the last few months.     Pain: 7/10 discomfort in the abdomen      Objective:   Internal vaginal assessment :  PERF: 3/3/5//10  Lengthening: impaired but improved with VC  Bearing down: WNL    Mod myofascial restriction and TTP (burning sensation) in R compressor urethrae, PC, IC  Mild to mod myofascial restriction in middle and deep ms layers on L side but no TTP    Abdominal assessment: mild to moderate myofascial restriction in mons pubis and along  scar   Internal reassessment : residual mild to moderate myofascial restriction on L>R, no TTP today  Continued impaired lengthening ability       Assessment: Today's session focused on symptom management, as abdominal pressure and knee pain limit patient's activity tolerance and QoL most notably at this point. Pt tolerated all soft tissue work well without reported increase in symptoms. Encouraged her to continue with exercise she's doing at home and to follow up with doctor regarding significant knee pain with position changes and weight bearing activity.       Goals: (to be met in 10 visits)  Patient will demonstrate optimal PFM lengthening with coordinated breathing x 10 reps to alleviate PFM pain and muscle tension.  Patient will demonstrate normalized tone and minimal pain onset (</= 2/10) with internal assessment for increased tolerance to gynecological exam.   Patient will  demonstrate hamstring/adductor muscle length WNL to alleviate tension in support structures of pelvic floor musculature.   Patient will report urination intervals of 2-3 hours indicating improved bladder health and function.   Patient will report adherence to HEP for continued exercise benefits following cessation of PT.     Plan: Continue with abdominal myofascial work, DB in various positions, generalized down-training. Update HEP.  Date: 6/12/2024  TX#: 2/10 Date:  6/19/24              TX#: 3/10 Date:  6/26/24               TX#: 4/10 Date: 7/1/24                TX#: 5/10 Date: 7/17/24  Tx#: 6/10   Neuro: 12'  Discussed nocturia symptoms including likely contributing factors to nightly urination    Discussed findings/implications of internal assessment      Neuro: 43'  Discussed bladder triggers that elicit urgency and cause UI, reviewed strategies for mitigating urgency including quick flicks, DB, distraction techniques, sitting down, etc.     Supine DB with cues for slow inhale and hold x 5 mins    Supine quick flicks 3 x 5    SL DB x 4 mins    SL open books x 10 B    Supine SB DKTC with DB x 20    Supine SB rotations x 20       Neuro: 12'  Supine SB DKTC x 4 mins    SL open books x 10 B    Standing thread the needles x 10 B Manual: 42'  Internal reassessment followed by myofascial release on L>R; cues provided for PFM lengthening with DB    Abdominal wall myofascial work   Manual: 42'  Abdominal wall myofascial work with increased emphasis on BLQ of abdomen    STM to quads, adductors, hamstrings, calves SHANTAL    Gentle tibiofemoral joint mobs SHANTAL   Manual: 32'  Internal vaginal assessment performed and cues provided on proper PFM contraction, relaxation, and bearing down. Cued in proper endurance and fast-twitch contractions, as well as elevator contractions. Assessed cough reflex and KNACK contraction.     Lower abdominal myofascial work     Manual: 26'  Myofascial release to abdominal wall, BLQ>BUQ                    HEP: elevators with emphasis on descent  Supine DB x 10 breaths   Child's pose with DB x 10 breaths   SL DB x 10 breaths     Charges: manual x 3       Total Timed Treatment: 42 min  Total Treatment Time: 42 min

## 2024-07-24 ENCOUNTER — OFFICE VISIT (OUTPATIENT)
Dept: PHYSICAL THERAPY | Facility: HOSPITAL | Age: 64
End: 2024-07-24
Attending: INTERNAL MEDICINE
Payer: COMMERCIAL

## 2024-07-24 PROCEDURE — 97110 THERAPEUTIC EXERCISES: CPT

## 2024-07-24 PROCEDURE — 97140 MANUAL THERAPY 1/> REGIONS: CPT

## 2024-07-24 NOTE — PROGRESS NOTES
Diagnosis:   Urinary urgency (R39.15)  Urge incontinence (N39.41)  Nocturia (R35.1)  Pelvic muscle wasting (N81.84)  High-tone pelvic floor dysfunction (M62.89)        Referring Provider: No ref. provider found  Date of Evaluation:    24    Precautions:   Hx of breast cancer Next MD visit:   none scheduled  Date of Surgery: n/a   Insurance Primary/Secondary: BCBS POS / N/A     # Auth Visits: 10            Subjective: Reports that she discontinued the estrogen blocker temporarily, which has seemed to help alleviate her knee pain somewhat. No episodes of incontinence. Pressure is less but still present most of the time.     Pain: 4/10 abdominal pressure      Objective:   Internal vaginal assessment :  PERF: 3/3/5//10  Lengthening: impaired but improved with VC  Bearing down: WNL    Mod myofascial restriction and TTP (burning sensation) in R compressor urethrae, PC, IC  Mild to mod myofascial restriction in middle and deep ms layers on L side but no TTP    Abdominal assessment: mild to moderate myofascial restriction in mons pubis and along  scar   Internal reassessment : residual mild to moderate myofascial restriction on L>R, no TTP today  Continued impaired lengthening ability       Assessment: Continued with abdominal soft tissue work today and continue to note gradual reduction in abdominal myofascial restriction. Progressed exercise today with increased emphasis on trunk mobility. Updated HEP. Plan to continue to progress through exercise to help reduce residual abdominal pressure sensation.       Goals: (to be met in 10 visits)  Patient will demonstrate optimal PFM lengthening with coordinated breathing x 10 reps to alleviate PFM pain and muscle tension.  Patient will demonstrate normalized tone and minimal pain onset (</= 2/10) with internal assessment for increased tolerance to gynecological exam.   Patient will demonstrate hamstring/adductor muscle length WNL to alleviate tension in  support structures of pelvic floor musculature.   Patient will report urination intervals of 2-3 hours indicating improved bladder health and function.   Patient will report adherence to HEP for continued exercise benefits following cessation of PT.     Plan: Continue with abdominal myofascial work, DB in various positions, generalized down-training.  Date: 6/12/2024  TX#: 2/10 Date:  6/19/24              TX#: 3/10 Date:  6/26/24               TX#: 4/10 Date: 7/1/24                TX#: 5/10 Date: 7/17/24  Tx#: 6/10 Date: 7/24/24  Tx#: 7/10   Neuro: 12'  Discussed nocturia symptoms including likely contributing factors to nightly urination    Discussed findings/implications of internal assessment      Neuro: 43'  Discussed bladder triggers that elicit urgency and cause UI, reviewed strategies for mitigating urgency including quick flicks, DB, distraction techniques, sitting down, etc.     Supine DB with cues for slow inhale and hold x 5 mins    Supine quick flicks 3 x 5    SL DB x 4 mins    SL open books x 10 B    Supine SB DKTC with DB x 20    Supine SB rotations x 20       Neuro: 12'  Supine SB DKTC x 4 mins    SL open books x 10 B    Standing thread the needles x 10 B Manual: 42'  Internal reassessment followed by myofascial release on L>R; cues provided for PFM lengthening with DB    Abdominal wall myofascial work   Manual: 42'  Abdominal wall myofascial work with increased emphasis on BLQ of abdomen    STM to quads, adductors, hamstrings, calves SHANTAL    Gentle tibiofemoral joint mobs SHANTAL Manual: 28'  Abdominal wall myofascial work with increased emphasis on BLQ of abdomen   Manual: 32'  Internal vaginal assessment performed and cues provided on proper PFM contraction, relaxation, and bearing down. Cued in proper endurance and fast-twitch contractions, as well as elevator contractions. Assessed cough reflex and KNACK contraction.     Lower abdominal myofascial work     Manual: 26'  Myofascial release to abdominal  wall, BLQ>BUQ   There-ex: 14'  Supine trunk rotations x 10    Supine glute stretch x 1 min B    Supine happy baby pelvic clocks, CW x 10, CCW x 10    SL open books x 10 B    HEP                   HEP: elevators with emphasis on descent  Supine DB x 10 breaths   Child's pose with DB x 10 breaths   SL DB x 10 breaths   Access Code: [email or web address]  Date: 07/24/2024  - Supine Lower Trunk Rotation  - 1 x daily - 7 x weekly - 10 reps  - Supine Pelvic Floor Stretch  - 1 x daily - 7 x weekly - 10 reps  - Supine Gluteus Stretch  - 1 x daily - 7 x weekly - 1 min hold  - Prone Press Up  - 1 x daily - 7 x weekly - 2 reps - 1 min hold  - Quadruped Rock Back into Prone Press Up  - 1 x daily - 7 x weekly - 10 reps  - Sidelying Open Book Thoracic Rotation with Knee on Foam Roll  - 1 x daily - 7 x weekly - 10 reps    Charges: manual x 2, ther-ex x 1       Total Timed Treatment: 42 min  Total Treatment Time: 42 min

## 2024-07-31 ENCOUNTER — OFFICE VISIT (OUTPATIENT)
Dept: PHYSICAL THERAPY | Facility: HOSPITAL | Age: 64
End: 2024-07-31
Attending: INTERNAL MEDICINE
Payer: COMMERCIAL

## 2024-07-31 PROCEDURE — 97140 MANUAL THERAPY 1/> REGIONS: CPT

## 2024-07-31 NOTE — PROGRESS NOTES
Diagnosis:   Urinary urgency (R39.15)  Urge incontinence (N39.41)  Nocturia (R35.1)  Pelvic muscle wasting (N81.84)  High-tone pelvic floor dysfunction (M62.89)        Referring Provider: No ref. provider found  Date of Evaluation:    24    Precautions:   Hx of breast cancer Next MD visit:   none scheduled  Date of Surgery: n/a   Insurance Primary/Secondary: BCBS POS / N/A     # Auth Visits: 10            Subjective: Reports that pelvic pressure continues to slowly improve. She can go 2-3 hours without having to urinate during the day now. No incontinent episodes.     Pain: 4/10 abdominal pressure      Objective:   Internal vaginal assessment :  PERF: 3/3/5//10  Lengthening: impaired but improved with VC  Bearing down: WNL    Mod myofascial restriction and TTP (burning sensation) in R compressor urethrae, PC, IC  Mild to mod myofascial restriction in middle and deep ms layers on L side but no TTP    Abdominal assessment: mild to moderate myofascial restriction in mons pubis and along  scar   Internal reassessment : residual mild to moderate myofascial restriction on L>R, no TTP today  Continued impaired lengthening ability       Assessment: Patient's urinary symptoms continue to resolve and are now WNL, as she is urinating at ideal intervals, and she has not experienced UI in several weeks. Her only remaining symptom is pelvic pressure, which is gradually improving, suggesting favorable response to myofascial work. Plan to continue with manual work, as needed, to mitigate remaining symptoms.      Goals: (to be met in 10 visits)  Patient will demonstrate optimal PFM lengthening with coordinated breathing x 10 reps to alleviate PFM pain and muscle tension.  Patient will demonstrate normalized tone and minimal pain onset (</= 2/10) with internal assessment for increased tolerance to gynecological exam.   Patient will demonstrate hamstring/adductor muscle length WNL to alleviate tension in support  structures of pelvic floor musculature.   Patient will report urination intervals of 2-3 hours indicating improved bladder health and function.   Patient will report adherence to HEP for continued exercise benefits following cessation of PT.     Plan: Continue with abdominal myofascial work, DB in various positions, generalized down-training.  Date:  6/19/24              TX#: 3/10 Date:  6/26/24               TX#: 4/10 Date: 7/1/24                TX#: 5/10 Date: 7/17/24  Tx#: 6/10 Date: 7/24/24  Tx#: 7/10 Date: 7/31/24  Tx#: 8/10   Neuro: 43'  Discussed bladder triggers that elicit urgency and cause UI, reviewed strategies for mitigating urgency including quick flicks, DB, distraction techniques, sitting down, etc.     Supine DB with cues for slow inhale and hold x 5 mins    Supine quick flicks 3 x 5    SL DB x 4 mins    SL open books x 10 B    Supine SB DKTC with DB x 20    Supine SB rotations x 20       Neuro: 12'  Supine SB DKTC x 4 mins    SL open books x 10 B    Standing thread the needles x 10 B Manual: 42'  Internal reassessment followed by myofascial release on L>R; cues provided for PFM lengthening with DB    Abdominal wall myofascial work   Manual: 42'  Abdominal wall myofascial work with increased emphasis on BLQ of abdomen    STM to quads, adductors, hamstrings, calves SHANTAL    Gentle tibiofemoral joint mobs SHANTAL Manual: 28'  Abdominal wall myofascial work with increased emphasis on BLQ of abdomen Manual: 40'  Abdominal wall myofascial work with increased emphasis on BLQ of abdomen    STM to quads, adductors, hamstrings, calves SHANTAL    Manual: 26'  Myofascial release to abdominal wall, BLQ>BUQ   There-ex: 14'  Supine trunk rotations x 10    Supine glute stretch x 1 min B    Supine happy baby pelvic clocks, CW x 10, CCW x 10    SL open books x 10 B    HEP                    HEP: elevators with emphasis on descent  Supine DB x 10 breaths   Child's pose with DB x 10 breaths   SL DB x 10 breaths   Access Code:  [email or web address]  Date: 07/24/2024  - Supine Lower Trunk Rotation  - 1 x daily - 7 x weekly - 10 reps  - Supine Pelvic Floor Stretch  - 1 x daily - 7 x weekly - 10 reps  - Supine Gluteus Stretch  - 1 x daily - 7 x weekly - 1 min hold  - Prone Press Up  - 1 x daily - 7 x weekly - 2 reps - 1 min hold  - Quadruped Rock Back into Prone Press Up  - 1 x daily - 7 x weekly - 10 reps  - Sidelying Open Book Thoracic Rotation with Knee on Foam Roll  - 1 x daily - 7 x weekly - 10 reps    Charges: manual x 3     Total Timed Treatment: 40 min  Total Treatment Time: 42 min

## 2024-08-07 ENCOUNTER — OFFICE VISIT (OUTPATIENT)
Dept: PHYSICAL THERAPY | Facility: HOSPITAL | Age: 64
End: 2024-08-07
Attending: INTERNAL MEDICINE
Payer: COMMERCIAL

## 2024-08-07 PROCEDURE — 97140 MANUAL THERAPY 1/> REGIONS: CPT

## 2024-08-07 PROCEDURE — 97530 THERAPEUTIC ACTIVITIES: CPT

## 2024-08-07 NOTE — PROGRESS NOTES
Diagnosis:   Urinary urgency (R39.15)  Urge incontinence (N39.41)  Nocturia (R35.1)  Pelvic muscle wasting (N81.84)  High-tone pelvic floor dysfunction (M62.89)        Referring Provider: No ref. provider found  Date of Evaluation:    24    Precautions:   Hx of breast cancer Next MD visit:   none scheduled  Date of Surgery: n/a   Insurance Primary/Secondary: BCBS POS / N/A     # Auth Visits: 10            Subjective: Reports that symptoms are about the same as last time. She is seeing her doctor tomorrow and planning to try a new Statin, as she believes that's contributing to her knee pain. She did have a long 4.5 hour road trip recently and she felt pretty good during it. Back pain is 75% improved since starting PT. Bladder pressure is 50% improved. Still with a few drops of UI on occasion, but that is much better. Still doing bladder retraining.     Pain: 4/10 abdominal pressure      Objective:   Internal vaginal assessment :  PERF: 3/3/5//10  Lengthening: impaired but improved with VC  Bearing down: WNL    Mod myofascial restriction and TTP (burning sensation) in R compressor urethrae, PC, IC  Mild to mod myofascial restriction in middle and deep ms layers on L side but no TTP    Abdominal assessment: mild to moderate myofascial restriction in mons pubis and along  scar   Internal reassessment : residual mild to moderate myofascial restriction on L>R, no TTP today  Continued impaired lengthening ability       Assessment: Discussed POC today including plan to continue PT after reassessment next session, as pt has made notable improvements, but she does continue to experience abdominal pressure/discomfort, as well as occasional UI. Continued with abdominal soft tissue work today with increased emphasis on lateral trunk and mons pubis region, as she continues to present with tissue restriction here. Plan to reassess including internal assessment next session.       Goals: (to be met in 10  visits)  Patient will demonstrate optimal PFM lengthening with coordinated breathing x 10 reps to alleviate PFM pain and muscle tension.  Patient will demonstrate normalized tone and minimal pain onset (</= 2/10) with internal assessment for increased tolerance to gynecological exam.   Patient will demonstrate hamstring/adductor muscle length WNL to alleviate tension in support structures of pelvic floor musculature.   Patient will report urination intervals of 2-3 hours indicating improved bladder health and function.   Patient will report adherence to HEP for continued exercise benefits following cessation of PT.     Plan: Continue with abdominal myofascial work, DB in various positions, generalized down-training. Plan for reassessment including f/u internal.   Date:  6/26/24               TX#: 4/10 Date: 7/1/24                TX#: 5/10 Date: 7/17/24  Tx#: 6/10 Date: 7/24/24  Tx#: 7/10 Date: 7/31/24  Tx#: 8/10 Date: 8/7/24  Tx#: 9/10   Neuro: 12'  Supine SB DKTC x 4 mins    SL open books x 10 B    Standing thread the needles x 10 B Manual: 42'  Internal reassessment followed by myofascial release on L>R; cues provided for PFM lengthening with DB    Abdominal wall myofascial work   Manual: 42'  Abdominal wall myofascial work with increased emphasis on BLQ of abdomen    STM to quads, adductors, hamstrings, calves SHANTAL    Gentle tibiofemoral joint mobs SHANTAL Manual: 28'  Abdominal wall myofascial work with increased emphasis on BLQ of abdomen Manual: 40'  Abdominal wall myofascial work with increased emphasis on BLQ of abdomen    STM to quads, adductors, hamstrings, calves SHANTAL Manual: 34'  Abdominal myofascial work with emphasis on lateral trunk musculature, as well as mons pubis region today   Manual: 26'  Myofascial release to abdominal wall, BLQ>BUQ   There-ex: 14'  Supine trunk rotations x 10    Supine glute stretch x 1 min B    Supine happy baby pelvic clocks, CW x 10, CCW x 10    SL open books x 10 B    HEP   Ther-act: 10'  POC/goal discussion and plan for reassessment next session                       HEP: elevators with emphasis on descent  Supine DB x 10 breaths   Child's pose with DB x 10 breaths   SL DB x 10 breaths   Access Code: [email or web address]  Date: 07/24/2024  - Supine Lower Trunk Rotation  - 1 x daily - 7 x weekly - 10 reps  - Supine Pelvic Floor Stretch  - 1 x daily - 7 x weekly - 10 reps  - Supine Gluteus Stretch  - 1 x daily - 7 x weekly - 1 min hold  - Prone Press Up  - 1 x daily - 7 x weekly - 2 reps - 1 min hold  - Quadruped Rock Back into Prone Press Up  - 1 x daily - 7 x weekly - 10 reps  - Sidelying Open Book Thoracic Rotation with Knee on Foam Roll  - 1 x daily - 7 x weekly - 10 reps    Charges: manual x 2, ther-act  x 1 Total Timed Treatment: 44 min  Total Treatment Time: 46 min

## 2024-08-08 ENCOUNTER — OFFICE VISIT (OUTPATIENT)
Dept: HEMATOLOGY/ONCOLOGY | Facility: HOSPITAL | Age: 64
End: 2024-08-08
Attending: GENETIC COUNSELOR, MS
Payer: COMMERCIAL

## 2024-08-08 VITALS
WEIGHT: 159.19 LBS | HEART RATE: 70 BPM | RESPIRATION RATE: 16 BRPM | OXYGEN SATURATION: 98 % | TEMPERATURE: 98 F | SYSTOLIC BLOOD PRESSURE: 131 MMHG | DIASTOLIC BLOOD PRESSURE: 80 MMHG | HEIGHT: 67.01 IN | BODY MASS INDEX: 24.99 KG/M2

## 2024-08-08 DIAGNOSIS — C50.912 INVASIVE DUCTAL CARCINOMA OF BREAST, FEMALE, LEFT (HCC): ICD-10-CM

## 2024-08-08 DIAGNOSIS — E78.00 HYPERCHOLESTEROLEMIA: Primary | ICD-10-CM

## 2024-08-08 DIAGNOSIS — N60.92 ATYPICAL DUCTAL HYPERPLASIA OF LEFT BREAST: ICD-10-CM

## 2024-08-08 DIAGNOSIS — M85.80 OSTEOPENIA AFTER MENOPAUSE: ICD-10-CM

## 2024-08-08 DIAGNOSIS — C50.512 CARCINOMA OF LOWER-OUTER QUADRANT OF LEFT BREAST IN FEMALE, ESTROGEN RECEPTOR POSITIVE (HCC): ICD-10-CM

## 2024-08-08 DIAGNOSIS — Z79.811 AROMATASE INHIBITOR USE: ICD-10-CM

## 2024-08-08 DIAGNOSIS — Z17.0 CARCINOMA OF LOWER-OUTER QUADRANT OF LEFT BREAST IN FEMALE, ESTROGEN RECEPTOR POSITIVE (HCC): ICD-10-CM

## 2024-08-08 DIAGNOSIS — C50.512 MALIGNANT NEOPLASM OF LOWER-OUTER QUADRANT OF LEFT BREAST OF FEMALE, ESTROGEN RECEPTOR POSITIVE (HCC): ICD-10-CM

## 2024-08-08 DIAGNOSIS — Z80.3 FAMILY HISTORY OF BREAST CANCER IN SISTER: ICD-10-CM

## 2024-08-08 DIAGNOSIS — Z17.0 MALIGNANT NEOPLASM OF LOWER-OUTER QUADRANT OF LEFT BREAST OF FEMALE, ESTROGEN RECEPTOR POSITIVE (HCC): ICD-10-CM

## 2024-08-08 DIAGNOSIS — Z78.0 OSTEOPENIA AFTER MENOPAUSE: ICD-10-CM

## 2024-08-08 DIAGNOSIS — C50.912 INVASIVE DUCTAL CARCINOMA OF BREAST, FEMALE, LEFT (HCC): Primary | ICD-10-CM

## 2024-08-08 LAB
ANION GAP SERPL CALC-SCNC: 8 MMOL/L (ref 0–18)
BUN BLD-MCNC: 17 MG/DL (ref 9–23)
CALCIUM BLD-MCNC: 10.2 MG/DL (ref 8.7–10.4)
CHLORIDE SERPL-SCNC: 109 MMOL/L (ref 98–112)
CO2 SERPL-SCNC: 24 MMOL/L (ref 21–32)
CREAT BLD-MCNC: 0.77 MG/DL
EGFRCR SERPLBLD CKD-EPI 2021: 88 ML/MIN/1.73M2 (ref 60–?)
GLUCOSE BLD-MCNC: 94 MG/DL (ref 70–99)
OSMOLALITY SERPL CALC.SUM OF ELEC: 293 MOSM/KG (ref 275–295)
POTASSIUM SERPL-SCNC: 4.1 MMOL/L (ref 3.5–5.1)
SODIUM SERPL-SCNC: 141 MMOL/L (ref 136–145)

## 2024-08-08 PROCEDURE — G2211 COMPLEX E/M VISIT ADD ON: HCPCS | Performed by: SPECIALIST

## 2024-08-08 PROCEDURE — 99214 OFFICE O/P EST MOD 30 MIN: CPT | Performed by: SPECIALIST

## 2024-08-08 PROCEDURE — 96374 THER/PROPH/DIAG INJ IV PUSH: CPT

## 2024-08-08 RX ORDER — TAMOXIFEN CITRATE 20 MG/1
20 TABLET ORAL DAILY
Qty: 90 TABLET | Refills: 1 | Status: SHIPPED | OUTPATIENT
Start: 2024-08-08

## 2024-08-08 NOTE — PROGRESS NOTES
Patient is here for MD f/u and Zometa infusion. Patient reports ongoing joint pain in lower extremities especially in both knees. Patient is getting pelvic floor therapy as ordered by Dr Holloway. Patient continues on Exemestane daily.    Education Record    Learner:  Patient    Disease / Diagnosis:  Breast cancer     Barriers / Limitations:  None   Comments:    Method:  Discussion   Comments:    General Topics:  Plan of care reviewed   Comments:    Outcome:  Shows understanding   Comments:

## 2024-08-08 NOTE — PROGRESS NOTES
[facility] Progress Note      Patient Name: Dalila Marie   YOB: 1960  Medical Record Number: [id number]  Attending Physician: Monroe Mcdowell M.D.     The 21st Century Cures Act makes medical notes like these available to patients in the interest of transparency. Please be advised this is a medical document. Medical documents are intended to carry relevant information, facts as evident, and the clinical opinion of the practitioner. The medical note is intended as peer to peer communication and may appear blunt or direct. It is written in medical language and may contain abbreviations or verbiage that are unfamiliar.     Date of Visit: 8/8/2024       Chief Complaint  Invasive ductal carcinoma, left breast - follow up.    Oncologic History  Dalila Marie is a 63 year old post-menopausal female with history of atypical ductal hyperplasia diagnosed in 2015. She declined chemopreventative endocrine therapy.     On 07/14/2021 she underwent MRI breast which showed a stable 0.7 cm area of enhancement medial to the nipple line in the right breast. In the left breast was an irregularly shaped mass measuring 0.6 cm at the 5 o'clock position. There was no axillary adenopathy bilaterally. On 08/04/2021 she underwent MRI guided biopsy of the left breast. Pathology showed grade 1 invasive ductal carcinoma in a background of lobular carcinoma in situ and atypical lobular hyperplasia. Immunohistochemical studies were as follows: estrogen receptor 100% positive (strong), progesterone receptor 5% (strong), Her2 0 (negative), Ki67 3%.     On 09/09/2021 she underwent left breast lumpectomy with sentinel lymph node biopsy. Pathology showed intermediate grade invasive ductal carcinoma measuring 0.7 cm, associated intermediate grade ductal carcinoma-in-situ, atypical lobular hyperplasia, and 2 sentinel lymph nodes negative for metastasis (0/2). All surgical margins were negative. Her2 by IHC  was repeated on this final specimen and was 2+. Her2 by FISH was not amplified. OncotypeDX recurrence score was 19.     On 10/27/2021 she began adjuvant radiation therapy.     She began adjuvant aromatase inhibitor therapy with anastrazole in 2022. She later switched to exemestane for arthralgias.     Genetic testing revealed no known pathogenic variants in the following 9 genes: BRITTANY, BRCA1, BRCA2, CDH1, CHEK2, PALB2, PTEN, STK11, and TP53.      History of Present Illness  Patient returns for follow up. She reports stable and manageable hot flashes. She complains of arthralgias that interfere with activity. No vaginal bleeding. No jaw pain.    Performance Status   Karnofsky 100% - Normal, no complaints.    Past Medical History (historical data, reviewed by physician)  Invasive ductal carcinoma, left breast (as above); hyperlipidemia, migraine headaches; shingles.     Past Surgical History (historical data, reviewed by physician)  Left breast lumpectomy with sentinel lymph node biopsy (as above);  x 2; diagnostic laparoscopy.     Family History (historical data, reviewed by physician)  Ms. Marie has an adult son and adult daughter.      Ms. Marie has one older brother and one older sister.  Ms. Thomas’s sister was diagnosed with breast cancer at age 40 and had a lumpectomy. She was diagnosed with a separate ipsilateral breast cancer at age 48 and pursued a bilateral mastectomy. She had BRAC1/2 testing through [employer] and no pathogenic variants were detected by sequencing and deletion/duplication analysis ([id number]).  Ms. Marie’s sister has not had updated genetic testing.       Ms. Marie’s mother has not had cancer.  Ms. Marie’s mother had one brother and one sister, neither of whom had cancer.  The daughter of Ms. Marie’s maternal uncle had breast cancer at age 50.  Ms. Marie’s maternal grandfather  young from a gunshot wound.  Ms. Marie’s maternal grandmother  in her 90s and  did not have cancer.       Ms. Marie’s father had prostate cancer in his 80s and squamous cell carcinoma skin cancer; he  in his 90s.  Ms. Marie’s father had nine brothers and sisters, none of whom had any known cancers.  Neither of Ms. Marie’s paternal grandparents had cancer.        Social History (historical data, reviewed by physician)  Denies tobacco and illicit drug use; social alcohol use.      Current Medications   REPLENS VAGINAL MOISTURIZER [facility] vaginally.      tamoxifen 20 MG Oral Tab Take 1 tablet (20 mg total) by mouth daily. 90 tablet 1    metFORMIN  MG Oral Tablet 24 Hr Take 1 tablet (500 mg total) by mouth daily. 90 tablet 0    exemestane 25 MG Oral Tab Take 1 tablet (25 mg total) by mouth daily. 90 tablet 2    rosuvastatin 20 MG Oral Tab Take 1 tablet (20 mg total) by mouth nightly.      Calcium 200 MG Oral Tab Take 600 Units by mouth daily.      omega-3 fatty acids 1000 MG Oral Cap Take 1,000 mg by mouth daily.      Cholecalciferol (VITAMIN D3) 1000 UNITS Oral Cap Take 1 tablet by mouth daily.      MULTI-VITAMIN OR daily       Allergies   Ms. Marie is allergic to trimethoprim.     Vital Signs   /80 (BP Location: Left arm, Patient Position: Sitting, Cuff Size: adult)   Pulse 70   Temp 97.9 °F (36.6 °C) (Temporal)   Resp 16   Ht 1.702 m (5' 7.01\")   Wt 72.2 kg (159 lb 3.2 oz)   LMP  (LMP Unknown)   SpO2 98%   BMI 24.93 kg/m²     Physical Examination   Constitutional Well developed and well nourished; in no apparent distress; appears close to chronological age.  Head  Normocephalic and atraumatic.  Eyes  Conjunctiva clear; sclera anicteric.  ENMT  External nose normal; external ears normal.  Neck  Supple.  Lymphatics No cervical, supraclavicular, axillary adenopathy.  Breasts  Bilateral breasts without suspicious masses.   Respiratory Normal effort; no respiratory distress; clear to auscultation bilaterally.   Cardiovascular Regular rate and rhythm; normal  S1S2.  Abdomen Soft; not tender; no masses; no hepatosplenomegaly.  Extremities No lower extremity edema.  Neurologic Motor and sensory grossly intact.  Psychiatric Mood and affect appropriate.    Laboratory  Recent Results (from the past 168 hour(s))   BASIC METABOLIC PANEL [E]    Collection Time: 24  8:40 AM   Result Value Ref Range    Glucose 94 70 - 99 mg/dL    Sodium 141 136 - 145 mmol/L    Potassium 4.1 3.5 - 5.1 mmol/L    Chloride 109 98 - 112 mmol/L    CO2 24.0 21.0 - 32.0 mmol/L    Anion Gap 8 0 - 18 mmol/L    BUN 17 9 - 23 mg/dL    Creatinine 0.77 0.55 - 1.02 mg/dL    Calcium, Total 10.2 8.7 - 10.4 mg/dL    Calculated Osmolality 293 275 - 295 mOsm/kg    eGFR-Cr 88 >=60 mL/min/1.73m2    Patient Fasting for BMP? Patient not present      Radiology  [facility]  Department of Radiology  [address] [place] [address] [address]  [place] [address] [phone]      Name: Dalila Marie \"Maddie\"  Ord Dr: Clara Huerta MD  : 1960    Age/Sex: 63 year old Female  Pt. Phone: [phone]  Exam Date: 2024  Procedure: [facility] SHAQ 2D+3D DIAGNOSTIC [facility]  BILAT (XIO=30202/04731)   -----------------------------------------------------------------------------------------------------------------------------------------------                  Clara Huerta MD  [address] [place] [address]  [address] [place] [id number]      Interpretation   PROCEDURE:  [facility] SHAQ 2D+3D DIAGNOSTIC BRAVO  BILAT (CPT=77066/38176)     COMPARISON:  [facility] , [facility], BRAVO SHAQ 2D+3D DIAGNOSTIC BRAVO LEFT (CPT=77065/14645), 10/03/2022, 3:04 PM.  [facility] NEFTALI, BRAVO SHAQ 2D+3D DIAGNOSTIC BRAVO  BILAT (XEF=[id number]), 3/28/2022, 1:13 PM.  [facility] NEFTALI, BRAVO POST PROCEDURE IMAGE LEFT (CPT=77065),   2021, 9:11 AM.  [facility] NEFTALI, BRAVO SHAQ 2D+3D DIAGNOSTIC BRAVO  BILAT (LWY=51970/21515), 3/31/2023, 10:40 AM.     INDICATIONS:  Z17.0 Carcinoma of lower-outer quadrant of left breast in female, estrogen receptor positive (HCC) C50.512  Carcinoma of lower-outer quadrant of left breast in *     VIEWS OBTAINED:  Diagnostic views of both breasts were obtained.    Standard 2D and additional multiplane thin sections of the breast were obtained for the purpose of Tomosynthesis evaluation.  The images were reconstructed and reviewed on the dedicated Tomosynthesis workstation.     BREAST COMPOSITION:  Scattered areas fibroglandular density.     FINDINGS:  No suspicious mass or microcalcifications.  No significant change.  Stable biopsy marking clip in right breast.  Stable post lumpectomy changes in left breast.                   =====  CONCLUSION:       BI-RADS CATEGORY:    DIAGNOSTIC CATEGORY 2--BENIGN FINDING NO CHANGE FROM COMPARISON ASSESSMENT.       RECOMMENDATIONS:    ROUTINE MAMMOGRAM AND CLINICAL EVALUATION IN 12 MONTHS.                A letter explaining the results in lay terms has been sent to the patient.  This exam was evaluated with a computer-aided device.  This patient's information has been entered into a reminder system with a target due date for the next mammogram.        LOCATION:  [facility]        Dictated by (CST): Mello Calvo MD on 3/29/2024 at 10:57 AM       Finalized by (CST): Mello Calvo MD on 3/29/2024 at 11:08 AM        Impression and Plan   1.   Invasive ductal carcinoma, left breast: Tumor was estrogen and progesterone receptor positive and Her2 negative. Patient is staged as IA. She has undergone lumpectomy and sentinel lymph node biopsy. OncotypeDX score was 19 and chemotherapy was not pursued. She received adjuvant radiation therapy.          As her tumor is hormone receptor positive, adjuvant endocrine therapy is recommended for at least 5 years. Patient is not tolerating exemestane well. Switch to tamoxifen to complete 5 years of therapy.          Patient's breast imaging is managed by Dr. Huerta.    2.   Osteopenia: Zoledronic acid today. Bone density next due in 07/2025.    Patient will continue to receive  longitudinal care by me for the complex care required for the cancer diagnosis including the expected complications related to anticancer therapy.    Planned Follow Up   Patient will return for follow up in 6 months.    Electronically signed by:    Monroe Mcdowell M.D.  System Medical Director of Oncology Services  [facility]

## 2024-08-08 NOTE — PROGRESS NOTES
Education Record    Learner:  Patient    Disease / Diagnosis: breast ca    Barriers / Limitations:  None   Comments:    Method:  Discussion   Comments:    General Topics:  Medication, Side effects and symptom management, Plan of care reviewed, and Fall risk and prevention   Comments:    Outcome:  Shows understanding   Comments:    Zometa infusion tolerated well. Next appt scheduled in 6 months - pt will have labs done prior at outpatient lab. Discharged ambulatory in stable condition upon completion of infusion.

## 2024-08-12 ENCOUNTER — OFFICE VISIT (OUTPATIENT)
Dept: PHYSICAL THERAPY | Facility: HOSPITAL | Age: 64
End: 2024-08-12
Attending: INTERNAL MEDICINE
Payer: COMMERCIAL

## 2024-08-12 PROCEDURE — 97140 MANUAL THERAPY 1/> REGIONS: CPT

## 2024-08-12 PROCEDURE — 97530 THERAPEUTIC ACTIVITIES: CPT

## 2024-08-12 NOTE — PROGRESS NOTES
Progress Summary  Pt has attended 10 visits in Physical Therapy.     Diagnosis:   Urinary urgency (R39.15)  Urge incontinence (N39.41)  Nocturia (R35.1)  Pelvic muscle wasting (N81.84)  High-tone pelvic floor dysfunction (M62.89)        Referring Provider: No ref. provider found  Date of Evaluation:    24    Precautions:   Hx of breast cancer Next MD visit:   none scheduled  Date of Surgery: n/a   Insurance Primary/Secondary: BCBS POS / N/A     # Auth Visits: 10            Subjective: Reports that back pain is 75% improved since starting PT. Bladder pressure is 50% improved. She still experiences a few drops of UI on occasion, but that is much better.     Pain: 6/10 vaginal pain       Objective:   Internal vaginal assessment :  PERF: 3/3/5//10  Lengthening: impaired but improved with VC  Bearing down: WNL    Mod myofascial restriction and TTP (burning sensation) in R compressor urethrae, PC, IC  Mild to mod myofascial restriction in middle and deep ms layers on L side but no TTP    Abdominal assessment: mild to moderate myofascial restriction in mons pubis and along  scar   Internal reassessment : residual mild to moderate myofascial restriction on L>R, no TTP today  Continued impaired lengthening ability      reassessment:  Notable reduction in abdominal myofascial restriction compared to start of care, residual restriction most notable in LLQ of abdomen  Internal reassessment: residual generalized tension and residual restriction/TTP in L perineal membrane and deep ms layer with reported 6/10 tenderness \"stinging\"  Improving lengthening ability, especially with cues to perform gentle contraction on exhale followed by \"drop\" on inhale      Assessment: Maddie has made notable objective and subjective progress over this POC. She has been able to successfully implement bladder retraining/urge deferment strategies resulting in a marked reduction in UI. Diaphragmatic breathing, exercise and  abdominal myofascial work have contributed to a 50% reduction in bladder pressure/discomfort; however, patient does continue to experience this symptom daily. Internal reassessment today also revealed residual myofascial restrictions, L sided tenderness, and residual deficits in PFM lengthening ability (although she demonstrates improvement since start of care). Given remaining deficits, pt would continue to benefit from pelvic health PT 1-2x/week for up to 10 more visits.     Goals: (to be met in 10 visits)  Patient will demonstrate optimal PFM lengthening with coordinated breathing x 10 reps to alleviate PFM pain and muscle tension. Progressing.  Patient will demonstrate normalized tone and minimal pain onset (</= 2/10) with internal assessment for increased tolerance to gynecological exam. Progressing.  Patient will demonstrate hamstring/adductor muscle length WNL to alleviate tension in support structures of pelvic floor musculature. Progressing.  Patient will report urination intervals of 2-3 hours indicating improved bladder health and function. Met.  Patient will report adherence to HEP for continued exercise benefits following cessation of PT. Met and progressing.      Plan: Continue skilled Physical Therapy 1-2 x/week or a total of 10 visits over a 90 day period. Treatment will include: ther-ex, neuro re-ed, manual, ther-act       Patient/Family/Caregiver was advised of these findings, precautions, and treatment options and has agreed to actively participate in planning and for this course of care.    Thank you for your referral. If you have any questions, please contact me at Dept: [phone].    Sincerely,  Electronically signed by therapist: Gifty Borja PT     Physician's certification required:  Yes  Please co-sign or sign and return this letter via fax as soon as possible to [phone].   I certify the need for these services furnished under this plan of treatment and while under my  care.    X___________________________________________________ Date____________________    Certification From: 8/13/2024  To:11/11/2024     Date: 7/1/24                TX#: 5/10 Date: 7/17/24  Tx#: 6/10 Date: 7/24/24  Tx#: 7/10 Date: 7/31/24  Tx#: 8/10 Date: 8/7/24  Tx#: 9/10 Date: 8/12/24  Tx#: 10/10   Manual: 42'  Internal reassessment followed by myofascial release on L>R; cues provided for PFM lengthening with DB    Abdominal wall myofascial work   Manual: 42'  Abdominal wall myofascial work with increased emphasis on BLQ of abdomen    STM to quads, adductors, hamstrings, calves SHANTAL    Gentle tibiofemoral joint mobs SHANTAL Manual: 28'  Abdominal wall myofascial work with increased emphasis on BLQ of abdomen Manual: 40'  Abdominal wall myofascial work with increased emphasis on BLQ of abdomen    STM to quads, adductors, hamstrings, calves SHANTAL Manual: 34'  Abdominal myofascial work with emphasis on lateral trunk musculature, as well as mons pubis region today Manual: 32'  Internal reassessment with L>R myofascial release and cues for PFM lengthening with coordinated breathing     Abdominal myofascial release      There-ex: 14'  Supine trunk rotations x 10    Supine glute stretch x 1 min B    Supine happy baby pelvic clocks, CW x 10, CCW x 10    SL open books x 10 B    HEP  Ther-act: 10'  POC/goal discussion and plan for reassessment next session     Ther-act: 10'  Reassessment/POC update including adding internal treatments into future sessions                            HEP: elevators with emphasis on descent  Supine DB x 10 breaths   Child's pose with DB x 10 breaths   SL DB x 10 breaths   Access Code: [email or web address]  Date: 07/24/2024  - Supine Lower Trunk Rotation  - 1 x daily - 7 x weekly - 10 reps  - Supine Pelvic Floor Stretch  - 1 x daily - 7 x weekly - 10 reps  - Supine Gluteus Stretch  - 1 x daily - 7 x weekly - 1 min hold  - Prone Press Up  - 1 x daily - 7 x weekly - 2 reps - 1 min hold  - Quadruped Rock Back into  Prone Press Up  - 1 x daily - 7 x weekly - 10 reps  - Sidelying Open Book Thoracic Rotation with Knee on Foam Roll  - 1 x daily - 7 x weekly - 10 reps    Charges: manual x 2, ther-act  x 1 Total Timed Treatment: 42 min  Total Treatment Time: 42 min

## 2024-08-16 ENCOUNTER — APPOINTMENT (OUTPATIENT)
Dept: PHYSICAL THERAPY | Facility: HOSPITAL | Age: 64
End: 2024-08-16
Payer: COMMERCIAL

## 2024-08-19 ENCOUNTER — TELEMEDICINE (OUTPATIENT)
Facility: CLINIC | Age: 64
End: 2024-08-19
Payer: COMMERCIAL

## 2024-08-19 DIAGNOSIS — E78.5 HYPERLIPIDEMIA, UNSPECIFIED HYPERLIPIDEMIA TYPE: ICD-10-CM

## 2024-08-19 DIAGNOSIS — R73.09 ELEVATED GLUCOSE: ICD-10-CM

## 2024-08-19 DIAGNOSIS — E66.3 OVERWEIGHT (BMI 25.0-29.9): ICD-10-CM

## 2024-08-19 DIAGNOSIS — Z51.81 ENCOUNTER FOR THERAPEUTIC DRUG LEVEL MONITORING: Primary | ICD-10-CM

## 2024-08-19 PROCEDURE — 99213 OFFICE O/P EST LOW 20 MIN: CPT | Performed by: PHYSICIAN ASSISTANT

## 2024-08-19 NOTE — PROGRESS NOTES
This visit is conducted using Telemedicine with live, interactive video and audio.    Patient has been referred to the [facility] website at [email or web address] to review the yearly Consent to Treat document.    Patient understands and accepts financial responsibility for any deductible, co-insurance and/or co-pays associated with this service.      HISTORY OF PRESENT ILLNESS  Chief Complaint   Patient presents with    Weight Check       Dalila Marie is a 63 year old female here for follow up in medical weight loss program.   151lbs  Pt is compliant on metformin  Denies chest pain, shortness of breath, dizziness, blurred vision, headache, paresthesia, nausea/vomiting.   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  Sleep: 7 hours/night         Wt Readings from Last 6 Encounters:   08/08/24 159 lb 3.2 oz (72.2 kg)   07/03/24 156 lb (70.8 kg)   03/26/24 164 lb (74.4 kg)   02/08/24 170 lb (77.1 kg)   12/26/23 172 lb (78 kg)   11/22/23 171 lb (77.6 kg)            Breakfast Lunch Dinner Snacks Fluids   Reviewed           REVIEW OF SYSTEMS  GENERAL HEALTH: feels well otherwise, denied any fevers chills or night sweats   RESPIRATORY: denies shortness of breath   CARDIOVASCULAR: denies chest pain  GI: denies abdominal pain    EXAM  LMP  (LMP Unknown)   GENERAL: well developed, well nourished,in no apparent distress, A/O x3  SKIN: no rashes,no suspicious lesions on visible skin  HEENT: atraumatic, normocephalic  LUNGS: no increased effort or work with breathing   NEURO: speaking  fluently and in clear sentences    Lab Results   Component Value Date    WBC 4.95 06/14/2021    RBC 4.58 06/14/2021    HGB 13.4 06/14/2021    HCT 42.0 06/14/2021    MCV 91.7 06/14/2021    MCH 29.3 06/14/2021    MCHC 31.9 06/14/2021    RDW 12.7 06/14/2021     06/14/2021     Lab Results   Component Value Date    GLU 94 08/08/2024    BUN 17 08/08/2024    BUNCREA 18.2 02/03/2024    CREATSERUM 0.77 08/08/2024    ANIONGAP 8 08/08/2024     06/27/2015    GFRNAA 75 02/10/2022    GFRAA 87 02/10/2022    CA 10.2 08/08/2024    OSMOCALC 293 08/08/2024    ALKPHO 40 (L) 08/09/2022    AST 16 08/09/2022    ALT 27 08/09/2022    BILT 0.3 08/09/2022    TP 7.3 08/09/2022    ALB 3.5 08/09/2022    GLOBULIN 3.8 08/09/2022    AGRATIO 0.9 (L) 06/13/2013     08/08/2024    K 4.1 08/08/2024     08/08/2024    CO2 24.0 08/08/2024     Lab Results   Component Value Date     06/14/2021    A1C 5.6 06/14/2021     Lab Results   Component Value Date    CHOLEST 169.00 06/14/2021    TRIG 76.00 06/14/2021    HDL 62.9 06/14/2021    LDL 91 06/14/2021    VLDL 15 06/14/2021     Lab Results   Component Value Date    T4F 0.96 12/31/2014    TSH 1.290 12/22/2018     No results found for: \"B12\", \"VITB12\"  Lab Results   Component Value Date    VITD 37.1 09/14/2021       Current Outpatient Medications on File Prior to Visit   Medication Sig Dispense Refill    REPLENS VAGINAL MOISTURIZER [facility] Place vaginally.      tamoxifen 20 MG Oral Tab Take 1 tablet (20 mg total) by mouth daily. 90 tablet 1    metFORMIN  MG Oral Tablet 24 Hr Take 2 tablets (1,000 mg total) by mouth daily. (Patient not taking: Reported on 8/8/2024) 180 tablet 0    metFORMIN  MG Oral Tablet 24 Hr Take 1 tablet (500 mg total) by mouth daily. 90 tablet 0    exemestane 25 MG Oral Tab Take 1 tablet (25 mg total) by mouth daily. 90 tablet 2    rosuvastatin 20 MG Oral Tab Take 1 tablet (20 mg total) by mouth nightly.      Calcium 200 MG Oral Tab Take 600 Units by  mouth daily.      omega-3 fatty acids 1000 MG Oral Cap Take 1,000 mg by mouth daily.      Cholecalciferol (VITAMIN D3) 1000 UNITS Oral Cap Take 1 tablet by mouth daily.      MULTI-VITAMIN OR daily       No current facility-administered medications on file prior to visit.       ASSESSMENT  Analyzed weight data:       Diagnoses and all orders for this visit:    Encounter for therapeutic drug level monitoring    Overweight (BMI 25.0-29.9)    Hyperlipidemia, unspecified hyperlipidemia type    Elevated glucose        PLAN  Initial Weight: 162lbs  Initial Weight Date: 4/23/24  Today's Weight: 151lbs  5% Goal: 8.1lbs  10% Goal: 16.2lbs  Total Weight Loss: Net loss 11lbs    Will continue metformin - advised side effects and adverse effects of medication   HLD - stable on current medication rosuvastatin, will continue, will continue to work on lifestyle modifications  Consistency with logging foods - protein and produce  Continue to work on incorporating more strength/resistance training  Nutrition: low carb diet/ recommended to eat breakfast daily/ regular protein intake  Medication use and side effects reviewed with patient.  Medication contraindications: caution GLP-1  Follow up with dietitian and psychologist as recommended.  Discussed the role of sleep and stress in weight management.  Counseled on comprehensive weight loss plan including attention to nutrition, exercise and behavior/stress management for success. See patient instruction below for more details.  Discussed strategies to overcome barriers to successful weight loss and weight maintenance  FITTE: ACSM recommendations (150-300 minutes/ week in active weight loss)   Weight Loss consent to treat reviewed and signed     There are no Patient Instructions on file for this visit.    No follow-ups on file.    Patient verbalizes understanding.    Kaitlynn Maynard PA-C  8/19/2024    Please note that the following visit was completed using two-way, real-time  interactive audio and video communication.  This has been done in good roscoe to provide continuity of care in the best interest of the provider-patient relationship, due to the ongoing public health crisis/national emergency and because of restrictions of visitation.  There are limitations of this visit as no physical exam could be performed.  Every conscious effort was taken to allow for sufficient and adequate time.  This billing was spent on reviewing labs, medications, radiology tests and decision making.  Appropriate medical decision-making and tests are ordered as detailed in the plan of care above    Kaitlynn Maynard PA-C

## 2024-08-20 ENCOUNTER — VIRTUAL PHONE E/M (OUTPATIENT)
Dept: UROLOGY | Facility: CLINIC | Age: 64
End: 2024-08-20
Attending: OBSTETRICS & GYNECOLOGY
Payer: COMMERCIAL

## 2024-08-20 DIAGNOSIS — R39.15 URINARY URGENCY: Primary | ICD-10-CM

## 2024-08-20 NOTE — PROGRESS NOTES
Patient to follow up pelvic floor sx  Given circumstances surrounding COVID-19 this visit is being conducted as a televisit with pt's consent.  Pt in safe, private environment for televisit, provider located in the office setting     She is currently using PFPT (appts in Sept)  Weekly sessions     Min improvement  Has learned a lot     Working on strategies  Less worry     No s/sx of UTI     Bowels reg     Less urgency   Some occas UUI, rare (2x/mo)    Vag moisturizer helpful    Started tamoxifen    Impression/Plan:    ICD-10-CM    1. Urinary urgency  R39.15           Discussion Items:   Discussed dietary and behavioral modifications for mgmt of urinary symptoms  Discussed weight management and benefits of weight loss on urinary symptoms  Reviewed AUA/SUFU guidelines on mgmt of non-neurogenic OAB  Discussed pharmacologic and nonpharmacologic mgmt options of urinary symptoms - reviewed risks, benefits, alternatives, and goals of treatment  Discussed specific risks related to OAB meds including, but not limited to dry mouth, constipation, blurry vision, cognitive changes, and BP elevation.    All questions answered  She understands and agrees to plan    Return in about 1 year (around 8/20/2025).    Christel Holloway, , FACOG, FACS    The 21st Century Cures Act makes medical notes like these available to patients in the interest of transparency. However, be advised this is a medical document. It is intended as peer to peer communication. It is written in medical language and may contain abbreviations or verbiage that are unfamiliar. It may appear blunt or direct. Medical documents are intended to carry relevant information, facts as evident, and the clinical opinion of the practitioner.

## 2024-09-09 ENCOUNTER — OFFICE VISIT (OUTPATIENT)
Dept: PHYSICAL THERAPY | Facility: HOSPITAL | Age: 64
End: 2024-09-09
Attending: INTERNAL MEDICINE
Payer: COMMERCIAL

## 2024-09-09 PROCEDURE — 97140 MANUAL THERAPY 1/> REGIONS: CPT

## 2024-09-09 NOTE — PROGRESS NOTES
Diagnosis:   Urinary urgency (R39.15)  Urge incontinence (N39.41)  Nocturia (R35.1)  Pelvic muscle wasting (N81.84)  High-tone pelvic floor dysfunction (M62.89)        Referring Provider: No ref. provider found  Date of Evaluation:    24    Precautions:   Hx of breast cancer Next MD visit:   none scheduled  Date of Surgery: n/a   Insurance Primary/Secondary: BCBS POS / N/A     # Auth Visits: 20            Subjective: Reports that she has been switched to Tamoxifen, and a  lot of her symptoms have markedly improved since including pelvic pressure. She did hurt her L rotator cuff, so she is starting PT for it soon. She is taking an anti-inflammatory med, which has helped. She has not had any incontinence in ~3 weeks. She's still stiff but not very sore or painful.     Pain: 0/10 vaginal pain       Objective:   Internal vaginal assessment :  PERF: 3/3/5//10  Lengthening: impaired but improved with VC  Bearing down: WNL    Mod myofascial restriction and TTP (burning sensation) in R compressor urethrae, PC, IC  Mild to mod myofascial restriction in middle and deep ms layers on L side but no TTP    Abdominal assessment: mild to moderate myofascial restriction in mons pubis and along  scar   Internal reassessment : residual mild to moderate myofascial restriction on L>R, no TTP today  Continued impaired lengthening ability      reassessment:  Notable reduction in abdominal myofascial restriction compared to start of care, residual restriction most notable in LLQ of abdomen  Internal reassessment: residual generalized tension and residual restriction/TTP in L perineal membrane and deep ms layer with reported 6/10 tenderness \"stinging\"  Improving lengthening ability, especially with cues to perform gentle contraction on exhale followed by \"drop\" on inhale      Assessment: Since switching medications, Maddie has experienced a significant symptom improvement in joint pain, pelvic pressure, and pelvic  pain. Internal reassessment today revealed marked reduction in tissue tension and resolution of tenderness, as well as improved excursion of PFM. Abdominal tension and pain has also markedly improved. Plan to follow up in ~1 month and discuss discharge if pain remains minimal and all LTGs are met.       Goals: (to be met in 10 visits)  Patient will demonstrate optimal PFM lengthening with coordinated breathing x 10 reps to alleviate PFM pain and muscle tension. Progressing.  Patient will demonstrate normalized tone and minimal pain onset (</= 2/10) with internal assessment for increased tolerance to gynecological exam. Progressing.  Patient will demonstrate hamstring/adductor muscle length WNL to alleviate tension in support structures of pelvic floor musculature. Progressing.  Patient will report urination intervals of 2-3 hours indicating improved bladder health and function. Met.  Patient will report adherence to HEP for continued exercise benefits following cessation of PT. Met and progressing.      Plan: Follow up in ~1 month and discharge if all LTGs have been sufficiently met.     Date: 7/17/24  Tx#: 6/10 Date: 7/24/24  Tx#: 7/10 Date: 7/31/24  Tx#: 8/10 Date: 8/7/24  Tx#: 9/10 Date: 8/12/24  Tx#: 10/10 Date: 9/9/24  Tx#: 11/20   Manual: 42'  Abdominal wall myofascial work with increased emphasis on BLQ of abdomen    STM to quads, adductors, hamstrings, calves SHANTAL    Gentle tibiofemoral joint mobs SHANTAL Manual: 28'  Abdominal wall myofascial work with increased emphasis on BLQ of abdomen Manual: 40'  Abdominal wall myofascial work with increased emphasis on BLQ of abdomen    STM to quads, adductors, hamstrings, calves SHANTAL Manual: 34'  Abdominal myofascial work with emphasis on lateral trunk musculature, as well as mons pubis region today Manual: 32'  Internal reassessment with L>R myofascial release and cues for PFM lengthening with coordinated breathing     Abdominal myofascial release  Manual: 40'  Internal  reassessment including PERF    Abdominal myofascial release BLQ of abdomen    There-ex: 14'  Supine trunk rotations x 10    Supine glute stretch x 1 min B    Supine happy baby pelvic clocks, CW x 10, CCW x 10    SL open books x 10 B    HEP  Ther-act: 10'  POC/goal discussion and plan for reassessment next session     Ther-act: 10'  Reassessment/POC update including adding internal treatments into future sessions          There-act: <8 mins, unbilled  Discussed POC including discharge planning                   HEP: elevators with emphasis on descent  Supine DB x 10 breaths   Child's pose with DB x 10 breaths   SL DB x 10 breaths   Access Code: [email or web address]  Date: 07/24/2024  - Supine Lower Trunk Rotation  - 1 x daily - 7 x weekly - 10 reps  - Supine Pelvic Floor Stretch  - 1 x daily - 7 x weekly - 10 reps  - Supine Gluteus Stretch  - 1 x daily - 7 x weekly - 1 min hold  - Prone Press Up  - 1 x daily - 7 x weekly - 2 reps - 1 min hold  - Quadruped Rock Back into Prone Press Up  - 1 x daily - 7 x weekly - 10 reps  - Sidelying Open Book Thoracic Rotation with Knee on Foam Roll  - 1 x daily - 7 x weekly - 10 reps    Charges: manual x 3 Total Timed Treatment: 40 min  Total Treatment Time: 45 min

## 2024-09-13 ENCOUNTER — APPOINTMENT (OUTPATIENT)
Dept: PHYSICAL THERAPY | Facility: HOSPITAL | Age: 64
End: 2024-09-13
Payer: COMMERCIAL

## 2024-09-20 ENCOUNTER — APPOINTMENT (OUTPATIENT)
Dept: PHYSICAL THERAPY | Facility: HOSPITAL | Age: 64
End: 2024-09-20
Payer: COMMERCIAL

## 2024-10-01 ENCOUNTER — HOSPITAL ENCOUNTER (OUTPATIENT)
Dept: MRI IMAGING | Facility: HOSPITAL | Age: 64
Discharge: HOME | End: 2024-10-01
Attending: SURGERY
Payer: COMMERCIAL

## 2024-10-01 DIAGNOSIS — C50.512 MALIGNANT NEOPLASM OF LOWER-OUTER QUADRANT OF LEFT BREAST OF FEMALE, ESTROGEN RECEPTOR POSITIVE (HCC): ICD-10-CM

## 2024-10-01 DIAGNOSIS — Z17.0 MALIGNANT NEOPLASM OF LOWER-OUTER QUADRANT OF LEFT BREAST OF FEMALE, ESTROGEN RECEPTOR POSITIVE (HCC): ICD-10-CM

## 2024-10-01 PROCEDURE — A9575 INJ GADOTERATE MEGLUMI 0.1ML: HCPCS

## 2024-10-01 PROCEDURE — 77049 MRI BREAST C-+ W/CAD BI: CPT | Performed by: SURGERY

## 2024-10-01 RX ORDER — GADOTERATE MEGLUMINE 376.9 MG/ML
15 INJECTION INTRAVENOUS
Status: COMPLETED | OUTPATIENT
Start: 2024-10-01 | End: 2024-10-01

## 2024-10-01 RX ADMIN — GADOTERATE MEGLUMINE 14 ML: 376.9 INJECTION INTRAVENOUS at 19:24:00

## 2024-10-18 NOTE — TELEPHONE ENCOUNTER
Requesting   Requested Prescriptions     Pending Prescriptions Disp Refills    METFORMIN  MG Oral Tablet 24 Hr [Pharmacy Med Name: METFORMIN HCL  MG TABLET] 180 tablet 0     Sig: TAKE 2 TABLETS BY MOUTH EVERY DAY      LOV: 08/19/24  RTC: 3-6mo  Last Relevant Labs:   Filled: 07/18/24 #180 with 0 refills    Future Appointments   Date Time Provider Department Center   10/20/2024  2:00 PM [facility]   1/13/2025  4:40 PM Kaitlynn Maynard, SADAF [id number] [address]   2/6/2025  3:15 PM Monroe Mcdowell MD  [facility]   2/6/2025  3:30 PM TERRA [place] RN5  [facility]   8/5/2025 11:00 AM Christel Holloway DO [facility] None

## 2024-10-20 ENCOUNTER — IMMUNIZATION (OUTPATIENT)
Dept: LAB | Age: 64
End: 2024-10-20
Attending: EMERGENCY MEDICINE
Payer: COMMERCIAL

## 2024-10-20 DIAGNOSIS — Z23 NEED FOR VACCINATION: Primary | ICD-10-CM

## 2024-10-20 PROCEDURE — 90471 IMMUNIZATION ADMIN: CPT

## 2024-10-20 PROCEDURE — 90656 IIV3 VACC NO PRSV 0.5 ML IM: CPT

## 2024-10-20 PROCEDURE — 90480 ADMN SARSCOV2 VAC 1/ONLY CMP: CPT

## 2024-10-22 RX ORDER — METFORMIN HYDROCHLORIDE 500 MG/1
1000 TABLET, EXTENDED RELEASE ORAL DAILY
Qty: 180 TABLET | Refills: 0 | Status: SHIPPED | OUTPATIENT
Start: 2024-10-22

## 2024-11-21 ENCOUNTER — OFFICE VISIT (OUTPATIENT)
Dept: OBGYN CLINIC | Facility: CLINIC | Age: 64
End: 2024-11-21
Payer: COMMERCIAL

## 2024-11-21 VITALS
HEIGHT: 67 IN | BODY MASS INDEX: 24.17 KG/M2 | WEIGHT: 154 LBS | HEART RATE: 72 BPM | SYSTOLIC BLOOD PRESSURE: 124 MMHG | DIASTOLIC BLOOD PRESSURE: 76 MMHG

## 2024-11-21 DIAGNOSIS — Z11.51 SCREENING FOR HUMAN PAPILLOMAVIRUS (HPV): ICD-10-CM

## 2024-11-21 DIAGNOSIS — Z12.4 SCREENING FOR CERVICAL CANCER: ICD-10-CM

## 2024-11-21 DIAGNOSIS — Z01.419 WELL WOMAN EXAM WITH ROUTINE GYNECOLOGICAL EXAM: Primary | ICD-10-CM

## 2024-11-21 DIAGNOSIS — R19.4 CHANGE IN BOWEL HABIT: ICD-10-CM

## 2024-11-21 PROCEDURE — 3008F BODY MASS INDEX DOCD: CPT | Performed by: NURSE PRACTITIONER

## 2024-11-21 PROCEDURE — 87624 HPV HI-RISK TYP POOLED RSLT: CPT | Performed by: NURSE PRACTITIONER

## 2024-11-21 PROCEDURE — 3074F SYST BP LT 130 MM HG: CPT | Performed by: NURSE PRACTITIONER

## 2024-11-21 PROCEDURE — 99459 PELVIC EXAMINATION: CPT | Performed by: NURSE PRACTITIONER

## 2024-11-21 PROCEDURE — 99396 PREV VISIT EST AGE 40-64: CPT | Performed by: NURSE PRACTITIONER

## 2024-11-21 PROCEDURE — 3078F DIAST BP <80 MM HG: CPT | Performed by: NURSE PRACTITIONER

## 2024-11-21 NOTE — PROGRESS NOTES
Subjective:  Chief Complaint   Patient presents with    Annual     Annual exam       64 year old female  presents for annual.    Pt feels that pelvic floor PT was helpful  Notes constipateion    No LMP recorded (lmp unknown). Patient is postmenopausal.  Hx Prior Abnormal Pap: No  Pap Date: 23  Pap Result Notes: WNL  Menarche: 15 (2024 11:34 AM)  Period Cycle (Days): postmenopausal (2024 11:34 AM)  Period Duration (Days): NA (2024 11:34 AM)  Period Flow: NA (2024 11:34 AM)  Use of Birth Control (if yes, specify type): Postmenopausal (2024 11:34 AM)  Hx Prior Abnormal Pap: No (2024 11:34 AM)  Pap Date: 23 (2024 11:34 AM)  Pap Result Notes: WNL (2024 11:34 AM)    Last Mammo:  3/2024 per Dr. Huerta    Personal history of breast CA    Feeling safe at home.    Most Recent Immunizations   Administered Date(s) Administered    >=3 YRS TRI  MULTIDOSE VIAL (82558) FLU CLINIC 10/16/2021    Covid-19 Vaccine Moderna 100 mcg/0.5 ml 2021    Covid-19 Vaccine Pfizer 30 mcg/0.3 ml 2021    Covid-19 Vaccine Pfizer Bivalent 30mcg/0.3mL 2022    Covid-19 Vaccine Pfizer Azael-Sucrose 30 mcg/0.3 ml 2022    FLU VAC QIV SPLIT 3 YRS AND OLDER (94010) 10/31/2019    FLUZONE 6 months and older PFS 0.5 ml (66320) 2018    Flucelvax 0.5 Ml Quad PFS Single Dose 10/18/2020    Fluvirin, 3 Years & >, Im 10/14/2015    Influenza 10/07/2017    Influenza Vaccine, trivalent (IIV3), PF 0.5mL (41320) 10/20/2024    Influenza(Afluria)0.5ml QIV PFS 10/28/2019    Kenalog Per 10mg Inj 2013    Pfizer Covid-19 Vaccine 30mcg/0.3ml 12yrs+ 10/20/2024    TDAP 07/15/2013      reports that she has never smoked. She has never used smokeless tobacco.   reports no history of alcohol use.    Past Medical History:    Amenorrhea    Anemia    as a child    Anesthesia complication    too much epidural with 1st  was paralyzed from jaw down    Atypical ductal hyperplasia of  left breast    Breast cancer (HCC)    left breast ca    Cancer (HCC)    breast (L)    Ductal carcinoma in situ of breast    left breast    Esophageal reflux    H/O mammogram    negative     HEADACHES    History of shingles    HYPERLIPIDEMIA    Infertility, female    Pap smear for cervical cancer screening    WNL -- for years , , , , , , , , , , , ,      Past Surgical History:   Procedure Laterality Date    Biopsy of breast, incisional Left 2015    Atypical Ductal Hyperplasia      ,1999    x 2    Laparoscopy,diagnostic   and     Lumpectomy left  2021    IDC;DCIS    Kasey biopsy stereo nodule 2 site bilat (cpt=19081/02461)  2015    atypia    Kasey localization wire 1 site left (cpt=19281)  2015    Atypia    Needle biopsy left  2021    MRI bx- DCIS    Needle biopsy right  2016    BENIGN   MRI BX    Other accessory Left 2023    rotator cuff repair    Radiation left         Review of Systems:  Pertinent items are noted in the HPI.    Objective:  /76   Pulse 72   Ht 67\"   Wt 154 lb (69.9 kg)   LMP  (LMP Unknown)   BMI 24.12 kg/m²    Physical Examination:  General appearance: Well dressed, well nourished in no apparent distress  Neurologic/Psychiatric: Alert and oriented to person, place and time, mood normal, affect appropriate  Head: Normocephalic without obvious deformity, atraumatic  Neck: No thyromegaly, supple, non-tender, no masses, no adenopathy  Lungs: Clear to auscultation bilaterally, no rales, wheezes or rhonchi  Breasts: Symmetric, non-tender, no masses, lesions, retraction, dimpling or discharge bilaterally, no axillary or supraclavicular lymphadenopathy  Heart: Regular rate and rhythm, no gallops or murmurs  Abdomen: Soft, non-tender, non-distended, no masses, no hepatosplenomegaly, no hernias, no inguinal lymphadenopathy  Pelvic:    External genitalia- Normal, Bartholin's, urethra, skeins glands  normal   Vagina- No vaginal lesions, physiologic discharge   Urethra- Non-tender, no masses   Urethral Meatus- No lesions or masses, no prolapse   Bladder- Non-tender, no masses   Cervix- No lesions, long/closed, no cervical motion tenderness   Uterus- Normal sized, non-tender, no masses   Adnexa-  Non-tender, no masses   Anus/Perineum- Normal, no masses or lesions  Extremities: Non-tender, full range of motion, no clubbing, cyanosis or edema  Skin:  General inspection- no rashes, lesions or discoloration      Assessment/Plan:  Normal well-woman exam.  Yearly mammogram per Breast surgery  Pap smear obtained.      Declined chaperone for exam today     Diagnoses and all orders for this visit:    Well woman exam with routine gynecological exam  - self breast exam discussed and encouraged    Screening for cervical cancer  -     ThinPrep PAP Smear; Future    Screening for human papillomavirus (HPV)  -     Hpv Dna  High Risk , Thin Prep Collect; Future    Change in bowel habit  - follow up with PCP and/or GI       Return in about 1 year (around 11/21/2025) for annual well woman exam or sooner if needed.

## 2024-11-22 LAB — HPV E6+E7 MRNA CVX QL NAA+PROBE: NEGATIVE

## 2024-12-04 ENCOUNTER — PATIENT MESSAGE (OUTPATIENT)
Dept: OBGYN CLINIC | Facility: CLINIC | Age: 64
End: 2024-12-04

## 2024-12-04 LAB
.: ABNORMAL
.: ABNORMAL

## 2024-12-05 ENCOUNTER — TELEPHONE (OUTPATIENT)
Dept: OBGYN CLINIC | Facility: CLINIC | Age: 64
End: 2024-12-05

## 2024-12-05 NOTE — TELEPHONE ENCOUNTER
Called and spoke with pt.   She had reviewed her pap results and not comfortable with recommendations to wait 3 years to repeat pap, has them done yearly and they have all been normal so far and these results have her concerned.  Reviewed that you follow the ASCCP guidelines, but is asking about a biopsy to be done.  Pt. a few years back had some a typical cells in breast , pre-cancerous had them removed then a few years later had a breast malignancy in 2021 .so she is very concerned.  Please advise, or call pt. to speak with her and address her concerns..  Thanks

## 2024-12-05 NOTE — TELEPHONE ENCOUNTER
Patient called regarding pap. Read patient message from Mabel. She does not agree with advice. She would like to speak to a nurse

## 2024-12-05 NOTE — TELEPHONE ENCOUNTER
Due to patient's history would rather not wait for atypical cells to develop into cancer as she is already actively dealing with breast cancer treatment. She would like atypical cells evaluated and removed. Patient states she wants pap to be done annually. Not comfortable with waiting three years to wait and watch. Would like to proceed with consult and colpo / biopsy with a doctor.     Future Appointments   Date Time Provider Department Center   12/11/2024  4:00 PM Ambre Mack DO [facility] [place]   1/13/2025  4:40 PM Kaitlynn Maynard, SADAF [facility] [address]   2/4/2025  3:15 PM Monroe Mcdowell MD  HEM ONC [facility]   2/4/2025  3:30 PM [facility] TX RN3  CHEMO [facility]   3/27/2025  5:00 PM  BRAVO RM3  MAMMO [facility]   8/5/2025 11:00 AM Christel Holloway DO [facility] None

## 2024-12-05 NOTE — TELEPHONE ENCOUNTER
We can certainly repeat pap sooner than 3 year recommendation  Based on the ASCCP guidelines, I would not recommend a colpo/biopsy. If pt strongly desires biopsy would need to follow up with MD partner.

## 2024-12-11 ENCOUNTER — OFFICE VISIT (OUTPATIENT)
Dept: OBGYN CLINIC | Facility: CLINIC | Age: 64
End: 2024-12-11
Payer: COMMERCIAL

## 2024-12-11 VITALS
HEART RATE: 70 BPM | DIASTOLIC BLOOD PRESSURE: 64 MMHG | HEIGHT: 67 IN | BODY MASS INDEX: 24.33 KG/M2 | WEIGHT: 155 LBS | SYSTOLIC BLOOD PRESSURE: 112 MMHG

## 2024-12-11 DIAGNOSIS — R87.619 ABNORMAL GLANDULAR PAPANICOLAOU SMEAR OF CERVIX: Primary | ICD-10-CM

## 2024-12-11 PROCEDURE — 3078F DIAST BP <80 MM HG: CPT | Performed by: STUDENT IN AN ORGANIZED HEALTH CARE EDUCATION/TRAINING PROGRAM

## 2024-12-11 PROCEDURE — 3074F SYST BP LT 130 MM HG: CPT | Performed by: STUDENT IN AN ORGANIZED HEALTH CARE EDUCATION/TRAINING PROGRAM

## 2024-12-11 PROCEDURE — 99214 OFFICE O/P EST MOD 30 MIN: CPT | Performed by: STUDENT IN AN ORGANIZED HEALTH CARE EDUCATION/TRAINING PROGRAM

## 2024-12-11 PROCEDURE — 3008F BODY MASS INDEX DOCD: CPT | Performed by: STUDENT IN AN ORGANIZED HEALTH CARE EDUCATION/TRAINING PROGRAM

## 2024-12-11 NOTE — PROGRESS NOTES
[facility]  Obstetrics and Gynecology  Follow Up Progress Note    Subjective:     Dalila Marie is a 64 year old  female who was last seen in office 2024 for annual visit and presents today to discuss abnormal pap smear. The patient reports most recent Pap smear notable for ASCUS with HPV negative cotesting.  This is the first time patient has had an abnormal Pap smear.  She was advised to repeat a Pap smear with HPV testing in 3 years per ASCCP guidelines, however patient would like sooner screening with possible biopsy due to her history of breast cancer and current tamoxifen use.     Review of Systems:  General: no complaints per category. See HPI for additional information.   Breast: no complaints per category. See HPI for additional information.   Respiratory: no complaints per category. See HPI for additional information.   Cardiovascular: no complaints per category. See HPI for additional information.   GI: no complaints per category. See HPI for additional information.   : no complaints per category. See HPI for additional information.   Heme: no complaints per category. See HPI for additional information.     OB History    Para Term  AB Living   2 2 2 0 0 2   SAB IAB Ectopic Multiple Live Births   0 0 0 0 2      # Outcome Date GA Lbr Kunal/2nd Weight Sex Type Anes PTL Lv   2 Term 99 39w0d  9 lb 5 oz (4.224 kg) F CS-LTranv EPI N HENRY   1 Term  39w0d  7 lb 15 oz (3.6 kg) M CS-LTranv   HENRY      Obstetric Comments   Menarche: 14 y/o   Menopause: 56 y/o   OCP use x 2 yrs   Fertility treatment for 4 yrs   No HRT use   Breastfeed: YES   BRA SIZE: 36B/C         Gyne History:     No LMP recorded (lmp unknown). Patient is postmenopausal.      Meds:  Medications Ordered Prior to Encounter[1]    All:  Allergies[2]    PMH:  Past Medical History:    Amenorrhea    Anemia    as a child    Anesthesia complication    too much epidural with 1st  was paralyzed from  jaw down    Atypical ductal hyperplasia of left breast    Breast cancer (HCC)    left breast ca    Cancer (HCC)    breast (L)    Ductal carcinoma in situ of breast    left breast    Esophageal reflux    H/O mammogram    negative     HEADACHES    History of shingles    HYPERLIPIDEMIA    Infertility, female    Pap smear for cervical cancer screening    WNL -- for years , , , , , , 2005, , , , , ,        PSH:  Past Surgical History:   Procedure Laterality Date    Biopsy of breast, incisional Left 2015    Atypical Ductal Hyperplasia      ,1999    x 2    Laparoscopy,diagnostic   and     Lumpectomy left  2021    IDC;DCIS    Kasey biopsy stereo nodule 2 site bilat (cpt=19081/42333)  2015    atypia    Kasey localization wire 1 site left (cpt=19281)  2015    Atypia    Needle biopsy left  2021    MRI bx- DCIS    Needle biopsy right  2016    BENIGN   MRI BX    Other accessory Left 2023    rotator cuff repair    Radiation left           Objective:     Vitals:    24 1615   BP: 112/64   Pulse: 70   Weight: 155 lb (70.3 kg)   Height: 67\"         Body mass index is 24.28 kg/m².    General: AAO.NAD.   CVS exam: normal peripheral perfusion  Chest: non-labored breathing, no tachypnea   Breast: Deferred  Abdominal exam: soft, nontender, nondistended  Pelvic exam: Deferred  Ext: non-tender, no edema    Labs:    Imaging:      Assessment:     Dalila Marie is a 64 year old  female who presents for discussion on abnormal Pap smear        Plan:     Problem List Items Addressed This Visit       Abnormal glandular Papanicolaou smear of cervix - Primary       Abnormal Pap smear  -Patient with recent Pap smear notable for ASCUS and HPV negative; patient has never had an abnormal Pap smear before  -Patient history notable for breast cancer status post lumpectomy and current chemotherapy treatment with tamoxifen  -Per ASCCP guidelines it  is reasonable to repeat a Pap smear with cotesting in 3 years; due to patient history, it is also reasonable to repeat a Pap smear in 1 year versus performing a colposcopy to rule out any other abnormalities  -Patient desires sooner evaluation ideally with biopsy  -Patient to schedule colposcopy procedure at earliest convenience      All of the findings and plan were discussed with the patient.  She notes understanding and agrees with the plan of care.  All questions were answered to the best of my ability at this time.      Total patient time was 30 minutes in evaluation, consultation, and coordination of care. This included face to face and non-face to face actions. The patient's questions and concerns were addressed.       RTC for colposcopy procedure    DO KRIS Marley       Discussed with patient that there will not be further notification of normal or benign results other than receiving results on HipWay. A HipWay message or telephone call will be placed by the physician and/or office staff if results are abnormal.     Note to patient and family   The 21st Century Cures Act makes medical notes available to patients in the interest of transparency.  However, please be advised that this is a medical document.  It is intended as [id number] communication.  It is written and medical language may contain abbreviations or verbiage that are technical and unfamiliar.  It may appear blunt or direct.  Medical documents are intended to carry relevant information, facts as evident, and the clinical opinion of the practitioner.        This note could include assistance by Dragon voice recognition. Errors in content may be related to improper recognition by the system; efforts to review and correct have been done but errors may still exist.          [1]   Current Outpatient Medications on File Prior to Visit   Medication Sig Dispense Refill    METFORMIN  MG Oral Tablet 24 Hr TAKE 2 TABLETS BY  MOUTH EVERY  tablet 0    tamoxifen 20 MG Oral Tab Take 1 tablet (20 mg total) by mouth daily. 90 tablet 1    rosuvastatin 20 MG Oral Tab Take 1 tablet (20 mg total) by mouth nightly.      Calcium 200 MG Oral Tab Take 600 Units by mouth daily.      omega-3 fatty acids 1000 MG Oral Cap Take 1,000 mg by mouth daily.      Cholecalciferol (VITAMIN D3) 1000 UNITS Oral Cap Take 1 tablet by mouth daily.      MULTI-VITAMIN OR daily      REPLENS VAGINAL MOISTURIZER [facility] vaginally.       No current facility-administered medications on file prior to visit.   [2]   Allergies  Allergen Reactions    Trimethoprim RASH

## 2024-12-27 ENCOUNTER — OFFICE VISIT (OUTPATIENT)
Dept: OBGYN CLINIC | Facility: CLINIC | Age: 64
End: 2024-12-27
Payer: COMMERCIAL

## 2024-12-27 VITALS — SYSTOLIC BLOOD PRESSURE: 122 MMHG | DIASTOLIC BLOOD PRESSURE: 72 MMHG | BODY MASS INDEX: 24 KG/M2 | WEIGHT: 156 LBS

## 2024-12-27 DIAGNOSIS — R87.610 PAPANICOLAOU SMEAR OF CERVIX WITH ATYPICAL SQUAMOUS CELLS OF UNDETERMINED SIGNIFICANCE (ASC-US): ICD-10-CM

## 2024-12-27 DIAGNOSIS — R87.619 ASCUS (ATYPICAL SQUAMOUS CELLS OF UNDETERMINED SIGNIFICANCE) ON GYNECOLOGIC PAPANICOLAOU SMEAR COMPLICATING PREGNANCY, ANTEPARTUM: Primary | ICD-10-CM

## 2024-12-27 DIAGNOSIS — R87.610 ASCUS OF CERVIX WITH NEGATIVE HIGH RISK HPV: ICD-10-CM

## 2024-12-27 DIAGNOSIS — O28.2 ASCUS (ATYPICAL SQUAMOUS CELLS OF UNDETERMINED SIGNIFICANCE) ON GYNECOLOGIC PAPANICOLAOU SMEAR COMPLICATING PREGNANCY, ANTEPARTUM: Primary | ICD-10-CM

## 2024-12-27 PROCEDURE — 88305 TISSUE EXAM BY PATHOLOGIST: CPT | Performed by: OBSTETRICS & GYNECOLOGY

## 2024-12-27 PROCEDURE — 88342 IMHCHEM/IMCYTCHM 1ST ANTB: CPT | Performed by: OBSTETRICS & GYNECOLOGY

## 2024-12-27 PROCEDURE — 57454 BX/CURETT OF CERVIX W/SCOPE: CPT | Performed by: OBSTETRICS & GYNECOLOGY

## 2024-12-27 PROCEDURE — 3078F DIAST BP <80 MM HG: CPT | Performed by: OBSTETRICS & GYNECOLOGY

## 2024-12-27 PROCEDURE — 3074F SYST BP LT 130 MM HG: CPT | Performed by: OBSTETRICS & GYNECOLOGY

## 2024-12-27 NOTE — PROCEDURES
Colposcopy Procedure Note    Date of Procedure: 12/27/24    Indications: 64 year old y/o female with ASCUS pap.     Pre-procedure diagnosis:   11/2013 - NILM   11/2014 - NILM   12/2015 - NILM   12/2016 - NILM   11/2017 - NILM   11/2018 - NILM   11/2019 - NILM   12/2020 - NILM   11/2021 - NILM   11/2022 - NILM  11/2023 - NILM   11/21/2024 - ASCUS HPV neg     Post-procedure diagnosis:  CIN1    Procedure:  Colposcopy   Cervical Biopsy   ECC     Procedure Details:   A discussion was held with patient including diagnosis, prognosis and management.   Recommendation was made as per ASCCP guidelines to repeat pap smear with cotesting in 3 years or repeat pap smear in 1 year as opposed to performing a colposcopy. Patient chose to proceed with colposcopy with possible biopsies. Risks, benefits and alteratives were discussed with the patient. The patient was agreed to proceed with procedure. She provided written and verbal consent. The patient was positioned supine and in stir-ups.    The sterile speculum was placed in the vagina and the cervix was visualized. The vagina appeared normal with no lesions or discolorations noted. The cervix was then swabbed and no lesions seen under gross examination. Nabothian cysts were present near the SCJ at 3:00. Green light filter was negative.      After application of acetic acid, white feathery acetowhite changes were noted at 4:00 o'clock. No mocaism, no punctations seen on gross examination. This findings were consistent after the application of Lugol's solution. Per patient request, lidocaine jelly was administered to cervix to aid with pain control. A biopsy was collected at 4:00.     The transformation zone was fully visualized. No lesions noted. Satisfactory Colposcopy. ECC collected.     Biopsy sites were examined. Monsel's solution was applied to the cervix. Good hemostasis noted. All instruments were removed from the vagina.     All tissue were placed into the designated  specimen containers and collected to be sent to pathology.     Impression: JORGE 1 pending final pathology     Disposition: Stable. RTC in 1 year for well woman exam or sooner if needed.      Patient requests to follow up in 2 weeks with in-person appointment and speculum examination of cervix.         Poli Guajardo MD   [facility] - OBGYN      Note to patient and family   The 21st Century Cures Act makes medical notes available to patients in the interest of transparency.  However, please be advised that this is a medical document.  It is intended as [id number]-bf-[id number] communication.  It is written and medical language may contain abbreviations or verbiage that are technical and unfamiliar.  It may appear blunt or direct.  Medical documents are intended to carry relevant information, facts as evident, and the clinical opinion of the practitioner.

## 2025-01-02 RX ORDER — DOXYCYCLINE HYCLATE 100 MG
100 TABLET ORAL 2 TIMES DAILY
Qty: 14 TABLET | Refills: 0 | Status: SHIPPED | OUTPATIENT
Start: 2025-01-02 | End: 2025-01-09

## 2025-01-06 ENCOUNTER — PATIENT MESSAGE (OUTPATIENT)
Dept: OBGYN CLINIC | Facility: CLINIC | Age: 65
End: 2025-01-06

## 2025-01-07 NOTE — TELEPHONE ENCOUNTER
Doxycycline Hyclate 100 MG Oral Tab 14 tablet 0 1/2/2025 1/9/2025   Sig:   Take 1 tablet (100 mg total) by mouth 2 (two) times daily for 7 days. 2 tablets initially then 1 tablet orally twice daily     Per Results notes dated 12/27/2024, Dr. Guajardo message to patient states:  \"I recommend treatment with an antibiotic called doxycycline. Take 100 mg orally twice daily for seven days.\"  Patient notified via [employer].

## 2025-01-08 ENCOUNTER — OFFICE VISIT (OUTPATIENT)
Dept: OBGYN CLINIC | Facility: CLINIC | Age: 65
End: 2025-01-08
Payer: COMMERCIAL

## 2025-01-08 VITALS
WEIGHT: 155 LBS | HEIGHT: 67 IN | DIASTOLIC BLOOD PRESSURE: 68 MMHG | HEART RATE: 65 BPM | BODY MASS INDEX: 24.33 KG/M2 | SYSTOLIC BLOOD PRESSURE: 112 MMHG

## 2025-01-08 DIAGNOSIS — N76.0 VAGINITIS AND VULVOVAGINITIS: Primary | ICD-10-CM

## 2025-01-08 DIAGNOSIS — Z11.3 SCREENING EXAMINATION FOR STD (SEXUALLY TRANSMITTED DISEASE): ICD-10-CM

## 2025-01-08 PROCEDURE — 3008F BODY MASS INDEX DOCD: CPT | Performed by: OBSTETRICS & GYNECOLOGY

## 2025-01-08 PROCEDURE — 87491 CHLMYD TRACH DNA AMP PROBE: CPT | Performed by: OBSTETRICS & GYNECOLOGY

## 2025-01-08 PROCEDURE — 99214 OFFICE O/P EST MOD 30 MIN: CPT | Performed by: OBSTETRICS & GYNECOLOGY

## 2025-01-08 PROCEDURE — 87591 N.GONORRHOEAE DNA AMP PROB: CPT | Performed by: OBSTETRICS & GYNECOLOGY

## 2025-01-08 PROCEDURE — 3074F SYST BP LT 130 MM HG: CPT | Performed by: OBSTETRICS & GYNECOLOGY

## 2025-01-08 PROCEDURE — 81514 NFCT DS BV&VAGINITIS DNA ALG: CPT | Performed by: OBSTETRICS & GYNECOLOGY

## 2025-01-08 PROCEDURE — 3078F DIAST BP <80 MM HG: CPT | Performed by: OBSTETRICS & GYNECOLOGY

## 2025-01-08 NOTE — PROGRESS NOTES
[facility]  Obstetrics and Gynecology  History & Physical    CC: Follow up appointment - colposcopy results     Subjective:     HPI: Dalila Marie is a 64 year old  female here for follow up regarding her colposcopy results.     Patient reports resolution of abnormal vaginal discharge after colposcopy consistent with silver nitrite/monsel's solution particles. She was prescribed doxycycline for chronic endocervicitis and started to take the antibiotic on . She notes the only major change in her vaginal health history is that she started using vaginal moisturizer intermittently after working with pelvic floor PT, as given her breast cancer history she is not a candidate for vaginal estrogen.     OB History:  OB History    Para Term  AB Living   2 2 2 0 0 2   SAB IAB Ectopic Multiple Live Births   0 0 0 0 2   Obstetric Comments   Menarche: 16 y/o   Menopause: 56 y/o   OCP use x 2 yrs   Fertility treatment for 4 yrs   No HRT use   Breastfeed: YES   BRA SIZE: 36B/C       Gyne History:     No LMP recorded (lmp unknown). Patient is postmenopausal.    Pap smear history:  2013 - NILM   2014 - NILM   2015 - NILM   2016 - NILM   2017 - NILM   2018 - NILM   2019 - NILM   2020 - NILM   2021 - NILM   2022 - NILM  2023 - NILM   2024 - ASCUS HPV neg   2024 - colposcopy: ECC and 4:00 site with LSIL and chronic endocervicitis     Denies history of STDs (non-HPV).   Currently sexually active   Birth control: postmenopausal    Review of Systems:  General: no complaints per category. See HPI for additional information.   Breast: no complaints per category. See HPI for additional information.   Respiratory: no complaints per category. See HPI for additional information.   Cardiovascular: no complaints per category. See HPI for additional information.   GI: no complaints per category. See HPI for additional information.   : no complaints per  category. See HPI for additional information.   Heme: no complaints per category. See HPI for additional information.       Objective:     Vitals:    25 1706   BP: 112/68   Pulse: 65   Weight: 155 lb (70.3 kg)   Height: 67\"         Body mass index is 24.28 kg/m².    General: AAO.NAD.   CVS exam: normal peripheral perfusion  Chest: non-labored breathing, no tachypnea   Breast: deferred  Abdominal exam: soft, nontender, nondistended  Pelvic exam:   VULVA: normal appearing vulva with no masses, tenderness or lesions  PERINEUM:  normal appearing, no lesions   URETHRAL MEATUS:  normal appearing, no lesions   VAGINA: normal appearing vagina with normal color and discharge, no lesions  CERVIX: cervix healing from biopsies, brown remnants of silver nitrite removed from cervix. Erythematous. No abnormal vaginal discharge in vault.   UTERUS: uterus is normal size, shape, consistency and nontender  ADNEXA: normal adnexa in size, nontender and no masses  PERIRECTAL: normal appearing, no lesions   Ext: non-tender, no edema    Assessment:     Dalila Marie is a 64 year old  female here for follow up regarding her colposcopy results.     Plan:     Problem List Items Addressed This Visit    None  Visit Diagnoses       Vaginitis and vulvovaginitis    -  Primary    Relevant Orders    Vaginitis Vaginosis PCR Panel    Screening examination for STD (sexually transmitted disease)        Relevant Orders    Chlamydia/Gc Amplification                Colposcopy Results  Chronic endocervicitis   - Discussed implication for LSIL, with recommendation to follow up in 1 year with pap smear and HPV testing.   - Chronic endocervicitis noted on colposcopic biopsies - started treatment with doxycycline 100 mg BID x 7 days   - Discussed testing with vaginosis and gonorrhea/chlamydia swabs. Recommendation to continue antibiotic course as prescribed.   - Vaginal discharge precautions reviewed.       All of the findings and plan were  discussed with the patient.  She notes understanding and agrees with the plan of care.  All questions were answered to the best of my ability at this time.      RTC in 1 year for an annual exam in November 2025    Poli Guajardo MD   [facility] - OBGYN      Note to patient and family   The 21st Century Cures Act makes medical notes available to patients in the interest of transparency.  However, please be advised that this is a medical document.  It is intended as [id number]-[id number] communication.  It is written and medical language may contain abbreviations or verbiage that are technical and unfamiliar.  It may appear blunt or direct.  Medical documents are intended to carry relevant information, facts as evident, and the clinical opinion of the practitioner.           This note could include assistance by Dragon voice recognition. Errors in content may be related to improper recognition by the system; efforts to review and correct have been done but errors may still exist.

## 2025-01-09 LAB
BV BACTERIA DNA VAG QL NAA+PROBE: NEGATIVE
C GLABRATA DNA VAG QL NAA+PROBE: NEGATIVE
C KRUSEI DNA VAG QL NAA+PROBE: NEGATIVE
C TRACH DNA SPEC QL NAA+PROBE: NEGATIVE
CANDIDA DNA VAG QL NAA+PROBE: NEGATIVE
N GONORRHOEA DNA SPEC QL NAA+PROBE: NEGATIVE
T VAGINALIS DNA VAG QL NAA+PROBE: NEGATIVE

## 2025-01-13 ENCOUNTER — OFFICE VISIT (OUTPATIENT)
Facility: CLINIC | Age: 65
End: 2025-01-13
Payer: COMMERCIAL

## 2025-01-13 VITALS
SYSTOLIC BLOOD PRESSURE: 128 MMHG | RESPIRATION RATE: 16 BRPM | HEIGHT: 67 IN | BODY MASS INDEX: 24.64 KG/M2 | HEART RATE: 78 BPM | DIASTOLIC BLOOD PRESSURE: 78 MMHG | WEIGHT: 157 LBS | OXYGEN SATURATION: 98 %

## 2025-01-13 DIAGNOSIS — E66.3 OVERWEIGHT (BMI 25.0-29.9): ICD-10-CM

## 2025-01-13 DIAGNOSIS — R73.09 ELEVATED GLUCOSE: ICD-10-CM

## 2025-01-13 DIAGNOSIS — Z51.81 ENCOUNTER FOR THERAPEUTIC DRUG LEVEL MONITORING: Primary | ICD-10-CM

## 2025-01-13 DIAGNOSIS — E78.5 HYPERLIPIDEMIA, UNSPECIFIED HYPERLIPIDEMIA TYPE: ICD-10-CM

## 2025-01-13 PROCEDURE — 3008F BODY MASS INDEX DOCD: CPT | Performed by: PHYSICIAN ASSISTANT

## 2025-01-13 PROCEDURE — 3078F DIAST BP <80 MM HG: CPT | Performed by: PHYSICIAN ASSISTANT

## 2025-01-13 PROCEDURE — 99213 OFFICE O/P EST LOW 20 MIN: CPT | Performed by: PHYSICIAN ASSISTANT

## 2025-01-13 PROCEDURE — 3074F SYST BP LT 130 MM HG: CPT | Performed by: PHYSICIAN ASSISTANT

## 2025-01-13 RX ORDER — METFORMIN HYDROCHLORIDE 500 MG/1
1000 TABLET, EXTENDED RELEASE ORAL DAILY
Qty: 180 TABLET | Refills: 1 | Status: SHIPPED | OUTPATIENT
Start: 2025-01-13

## 2025-01-13 NOTE — PROGRESS NOTES
HISTORY OF PRESENT ILLNESS  Chief Complaint   Patient presents with    Weight Check     +6lbs       Dalila Marie is a 64 year old female here for follow up in medical weight loss program.   +6lbs  Compliant on metformin  Denies chest pain, shortness of breath, dizziness, blurred vision, headache, paresthesia, nausea/vomiting.   Admits holidays were a little harder  Made her 150lb goal for September  Was able to maintain for awhile  Has been having more sweets and carbs  Was doing well with protein    Exercise/Activity: Is starting yoga twice a week  Nutrition: 24 hour food log reviewed, eating regular meals, +protein  Stress: 8/10  Sleep: 7 hours/night     [facility] Follow Up    General Information  Success Moment: losing 20 lbs for  my son's wedding in Sept.  Challenging Moment: Have gained 2-3 lbs since Sept.  Nutrition Recall  Breakfast: 1 c. high fiber cereal, banana, 8 oz almond milk, 8 oz water   Lunch: 14 oz black tea, 1 1/2 c. homemade turkey chili, 20 oyster   crackers, 2 mandarin oranges, 4 oz water   Dinner: 10 oz water, homemade pot roast with onions, carrots; 1/2 c.   noodles; 1 Tasia May pixie Snacks: 1/2 slice whole wheat bread with tuna   Fluids: see above Dining Out: 2   Exercise   Patient stated exercises # days/week: 2  Patient stated perceived level of   exertion: 1 Anaerobic Days: 0   Aerobic Days: 0   Patient stated average level of stress: 8  Sleep   Patient stated # hours uninterrupted sleep: 7   Patient stated feels   restful: Yes      Goals: increase exercise; decrease sugar intake              Wt Readings from Last 6 Encounters:   01/13/25 157 lb (71.2 kg)   01/08/25 155 lb (70.3 kg)   12/27/24 156 lb (70.8 kg)   12/11/24 155 lb (70.3 kg)   11/21/24 154 lb (69.9 kg)   08/08/24 159 lb 3.2 oz (72.2 kg)            Breakfast Lunch Dinner Snacks Fluids   Reviewed           REVIEW OF SYSTEMS  GENERAL HEALTH: feels well otherwise, denied any fevers chills or night sweats   RESPIRATORY: denies  shortness of breath   CARDIOVASCULAR: denies chest pain  GI: denies abdominal pain    EXAM  /78   Pulse 78   Resp 16   Ht 5' 7\" (1.702 m)   Wt 157 lb (71.2 kg)   LMP  (LMP Unknown)   SpO2 98%   BMI 24.59 kg/m²   GENERAL: well developed, well nourished,in no apparent distress, A/O x3  SKIN: no rashes,no suspicious lesions  HEENT: atraumatic, normocephalic, OP-clear, PERRL  NECK: supple,no adenopathy  LUNGS: clear to auscultation bilaterally   CARDIO: RRR without murmur  GI: good BS's,NT/ND, no masses or HSM  EXTREMITIES: no cyanosis, no clubbing, no edema    Lab Results   Component Value Date    WBC 4.95 06/14/2021    RBC 4.58 06/14/2021    HGB 13.4 06/14/2021    HCT 42.0 06/14/2021    MCV 91.7 06/14/2021    MCH 29.3 06/14/2021    MCHC 31.9 06/14/2021    RDW 12.7 06/14/2021     06/14/2021     Lab Results   Component Value Date    GLU 94 08/08/2024    BUN 17 08/08/2024    BUNCREA 18.2 02/03/2024    CREATSERUM 0.77 08/08/2024    ANIONGAP 8 08/08/2024     06/27/2015    GFRNAA 75 02/10/2022    GFRAA 87 02/10/2022    CA 10.2 08/08/2024    OSMOCALC 293 08/08/2024    ALKPHO 40 (L) 08/09/2022    AST 16 08/09/2022    ALT 27 08/09/2022    BILT 0.3 08/09/2022    TP 7.3 08/09/2022    ALB 3.5 08/09/2022    GLOBULIN 3.8 08/09/2022    AGRATIO 0.9 (L) 06/13/2013     08/08/2024    K 4.1 08/08/2024     08/08/2024    CO2 24.0 08/08/2024     Lab Results   Component Value Date     06/14/2021    A1C 5.6 06/14/2021     Lab Results   Component Value Date    CHOLEST 169.00 06/14/2021    TRIG 76.00 06/14/2021    HDL 62.9 06/14/2021    LDL 91 06/14/2021    VLDL 15 06/14/2021     Lab Results   Component Value Date    T4F 0.96 12/31/2014    TSH 1.290 12/22/2018     No results found for: \"B12\", \"VITB12\"  Lab Results   Component Value Date    VITD 37.1 09/14/2021       Medications Ordered Prior to Encounter[1]    ASSESSMENT  Analyzed weight data:       Diagnoses and all orders for this  visit:    Encounter for therapeutic drug level monitoring  -     metFORMIN  MG Oral Tablet 24 Hr; Take 2 tablets (1,000 mg total) by mouth daily.    Overweight (BMI 25.0-29.9)  -     metFORMIN  MG Oral Tablet 24 Hr; Take 2 tablets (1,000 mg total) by mouth daily.    Hyperlipidemia, unspecified hyperlipidemia type  -     metFORMIN  MG Oral Tablet 24 Hr; Take 2 tablets (1,000 mg total) by mouth daily.    Elevated glucose  -     metFORMIN  MG Oral Tablet 24 Hr; Take 2 tablets (1,000 mg total) by mouth daily.        PLAN  Initial Weight: 162lbs  Initial Weight Date: 4/23/24  Today's Weight: 157lbs  5% Goal: 8.1lbs  10% Goal: 16.2lbs  Total Weight Loss: +6lbs/Net loss 5lbs    Will continue metformin - advised side effects and adverse effects of medication   HLD - stable on current medication rosuvastatn, will continue, will continue to work on lifestyle modifications  Elevated glucose - continue metformin, low carb diet  Consistency with logging foods - protein and produce  Incorporate strength/resistance training  Nutrition: low carb diet/ recommended to eat breakfast daily/ regular protein intake  Medication use and side effects reviewed with patient.  Medication contraindications: caution GLP-1  Follow up with dietitian and psychologist as recommended.  Discussed the role of sleep and stress in weight management.  Counseled on comprehensive weight loss plan including attention to nutrition, exercise and behavior/stress management for success. See patient instruction below for more details.  Discussed strategies to overcome barriers to successful weight loss and weight maintenance  FITTE: ACSM recommendations (150-300 minutes/ week in active weight loss)   Weight Loss consent to treat reviewed and signed       NOTE TO PATIENT: The 21st Century Cures Act makes clinical notes like these available to patients in the interest of transparency. Clinical notes are medical documents used by physicians  and care providers to communicate with each other. These documents include medical language and terminology, abbreviations, and treatment information that may sound technical and at times possibly unfamiliar. In addition, at times, the verbiage may appear blunt or direct. These documents are one tool providers use to communicate relevant information and clinical opinions of the care providers in a way that allows common understanding of the clinical context.     There are no Patient Instructions on file for this visit.    No follow-ups on file.    Patient verbalizes understanding.    Kaitlynn Maynard PA-C  1/13/2025           [1]   Current Outpatient Medications on File Prior to Visit   Medication Sig Dispense Refill    REPLENS VAGINAL MOISTURIZER [facility] vaginally.      tamoxifen 20 MG Oral Tab Take 1 tablet (20 mg total) by mouth daily. 90 tablet 1    rosuvastatin 20 MG Oral Tab Take 1 tablet (20 mg total) by mouth nightly.      Calcium 200 MG Oral Tab Take 600 Units by mouth daily.      omega-3 fatty acids 1000 MG Oral Cap Take 1,000 mg by mouth daily.      Cholecalciferol (VITAMIN D3) 1000 UNITS Oral Cap Take 1 tablet by mouth daily.      MULTI-VITAMIN OR daily       No current facility-administered medications on file prior to visit.

## 2025-02-02 ENCOUNTER — LAB ENCOUNTER (OUTPATIENT)
Dept: LAB | Facility: HOSPITAL | Age: 65
End: 2025-02-02
Attending: SPECIALIST
Payer: COMMERCIAL

## 2025-02-02 DIAGNOSIS — Z79.811 AROMATASE INHIBITOR USE: ICD-10-CM

## 2025-02-02 DIAGNOSIS — Z78.0 OSTEOPENIA AFTER MENOPAUSE: ICD-10-CM

## 2025-02-02 DIAGNOSIS — C50.912 INVASIVE DUCTAL CARCINOMA OF BREAST, FEMALE, LEFT (HCC): ICD-10-CM

## 2025-02-02 DIAGNOSIS — M85.80 OSTEOPENIA AFTER MENOPAUSE: ICD-10-CM

## 2025-02-02 LAB
ANION GAP SERPL CALC-SCNC: 11 MMOL/L (ref 0–18)
BUN BLD-MCNC: 19 MG/DL (ref 9–23)
CALCIUM BLD-MCNC: 9.2 MG/DL (ref 8.7–10.6)
CHLORIDE SERPL-SCNC: 108 MMOL/L (ref 98–112)
CO2 SERPL-SCNC: 24 MMOL/L (ref 21–32)
CREAT BLD-MCNC: 0.82 MG/DL
EGFRCR SERPLBLD CKD-EPI 2021: 81 ML/MIN/1.73M2 (ref 60–?)
FASTING STATUS PATIENT QL REPORTED: NO
GLUCOSE BLD-MCNC: 100 MG/DL (ref 70–99)
OSMOLALITY SERPL CALC.SUM OF ELEC: 298 MOSM/KG (ref 275–295)
POTASSIUM SERPL-SCNC: 4.1 MMOL/L (ref 3.5–5.1)
SODIUM SERPL-SCNC: 143 MMOL/L (ref 136–145)

## 2025-02-02 PROCEDURE — 80048 BASIC METABOLIC PNL TOTAL CA: CPT

## 2025-02-02 PROCEDURE — 36415 COLL VENOUS BLD VENIPUNCTURE: CPT

## 2025-02-02 NOTE — PROGRESS NOTES
[facility] Progress Note       Patient Name: Dalila Marie   YOB: 1960  Medical Record Number: [id number]  Attending Physician: Monroe Mcdowell M.D.     The 21st Century Cures Act makes medical notes like these available to patients in the interest of transparency. Please be advised this is a medical document. Medical documents are intended to carry relevant information, facts as evident, and the clinical opinion of the practitioner. The medical note is intended as peer to peer communication and may appear blunt or direct. It is written in medical language and may contain abbreviations or verbiage that are unfamiliar.     Date of Visit: 2/4/2025       Chief Complaint  Invasive ductal carcinoma, left breast - follow up.    Oncologic History  Dalila Marie is a 64 year old post-menopausal female with history of atypical ductal hyperplasia diagnosed in 2015. She declined chemopreventative endocrine therapy.     On 07/14/2021 she underwent MRI breast which showed a stable 0.7 cm area of enhancement medial to the nipple line in the right breast. In the left breast was an irregularly shaped mass measuring 0.6 cm at the 5 o'clock position. There was no axillary adenopathy bilaterally. On 08/04/2021 she underwent MRI guided biopsy of the left breast. Pathology showed grade 1 invasive ductal carcinoma in a background of lobular carcinoma in situ and atypical lobular hyperplasia. Immunohistochemical studies were as follows: estrogen receptor 100% positive (strong), progesterone receptor 5% (strong), Her2 0 (negative), Ki67 3%.     On 09/09/2021 she underwent left breast lumpectomy with sentinel lymph node biopsy. Pathology showed intermediate grade invasive ductal carcinoma measuring 0.7 cm, associated intermediate grade ductal carcinoma-in-situ, atypical lobular hyperplasia, and 2 sentinel lymph nodes negative for metastasis (0/2). All surgical margins were negative.  Her2 by IHC was repeated on this final specimen and was 2+. Her2 by FISH was not amplified. OncotypeDX recurrence score was 19.     On 10/27/2021 she began adjuvant radiation therapy.     She began adjuvant aromatase inhibitor therapy with anastrazole in 2022. She later switched to exemestane for arthralgias. In 2024, she switched to tamoxifen for continued arthralgias.     Genetic testing revealed no known pathogenic variants in the following 9 genes: BRITTANY, BRCA1, BRCA2, CDH1, CHEK2, PALB2, PTEN, STK11, and TP53.      History of Present Illness  Patient returns for follow up. She states that overall she feel much better since switching to tamoxifen. No new respiratory complaints. She reports that recently she has noticed some depression and is scheduled to see a counselor. She reports right sided mid abdominal pain that began on Saturday. She also reports constipation since Saturday. No blood in the stool or melena.     Patient is up to date with colonoscopy.     Performance Status   Karnofsky 90% - Normal, only minor signs/symptoms.     Past Medical History (historical data, reviewed by physician)  Invasive ductal carcinoma, left breast (as above); hyperlipidemia, migraine headaches; shingles.     Past Surgical History (historical data, reviewed by physician)  Left breast lumpectomy with sentinel lymph node biopsy (as above);  x 2; diagnostic laparoscopy.     Family History (historical data, reviewed by physician)  Ms. Marie has an adult son and adult daughter.      Ms. Marie has one older brother and one older sister.  Ms. Thomas’s sister was diagnosed with breast cancer at age 40 and had a lumpectomy. She was diagnosed with a separate ipsilateral breast cancer at age 48 and pursued a bilateral mastectomy. She had BRAC1/2 testing through [facility] and no pathogenic variants were detected by sequencing and deletion/duplication analysis ([id number]).  Ms. Marie’s sister has not had updated  genetic testing.       Ms. Marie’s mother has not had cancer.  Ms. Marie’s mother had one brother and one sister, neither of whom had cancer.  The daughter of Ms. Marie’s maternal uncle had breast cancer at age 50.  Ms. Marie’s maternal grandfather  young from a gunshot wound.  Ms. Marie’s maternal grandmother  in her 90s and did not have cancer.       Ms. Marie’s father had prostate cancer in his 80s and squamous cell carcinoma skin cancer; he  in his 90s.  Ms. Marie’s father had nine brothers and sisters, none of whom had any known cancers.  Neither of Ms. Marie’s paternal grandparents had cancer.        Social History (historical data, reviewed by physician)  Denies tobacco and illicit drug use; social alcohol use.      Current Medications   metFORMIN  MG Oral Tablet 24 Hr Take 2 tablets (1,000 mg total) by mouth daily. 180 tablet 1    REPLENS VAGINAL MOISTURIZER [facility] vaginally.      tamoxifen 20 MG Oral Tab Take 1 tablet (20 mg total) by mouth daily. 90 tablet 1    rosuvastatin 20 MG Oral Tab Take 1 tablet (20 mg total) by mouth nightly.      Calcium 200 MG Oral Tab Take 600 Units by mouth daily.      omega-3 fatty acids 1000 MG Oral Cap Take 1,000 mg by mouth daily.      Cholecalciferol (VITAMIN D3) 1000 UNITS Oral Cap Take 1 tablet by mouth daily.      MULTI-VITAMIN OR daily       Allergies   Ms. Marie is allergic to trimethoprim.     Vital Signs   /73 (BP Location: Left arm, Patient Position: Sitting)   Pulse 71   Temp 97.3 °F (36.3 °C) (Temporal)   Resp 18   Ht 1.702 m (5' 7.01\")   Wt 71.8 kg (158 lb 3.2 oz)   LMP  (LMP Unknown)   SpO2 98%   BMI 24.77 kg/m²     Physical Examination   Constitutional Well developed and well nourished; in no apparent distress.  Head  Normocephalic and atraumatic.  Eyes  Conjunctiva clear; sclera anicteric.  ENMT  External nose normal; external ears normal.  Neck  Supple.  Lymphatics No cervical, supraclavicular, axillary  adenopathy.  Breasts  Bilateral breasts without suspicious masses.   Respiratory Normal effort; no respiratory distress; clear to auscultation bilaterally.   Cardiovascular Regular rate and rhythm; normal S1S2.  Abdomen Soft; not tender; no masses; no hepatosplenomegaly.  Extremities No lower extremity edema.  Neurologic Motor and sensory grossly intact.  Psychiatric Mood and affect appropriate.    Laboratory  Recent Results (from the past week)   BASIC METABOLIC PANEL [E]    Collection Time: 25  9:36 AM   Result Value Ref Range    Glucose 100 (H) 70 - 99 mg/dL    Sodium 143 136 - 145 mmol/L    Potassium 4.1 3.5 - 5.1 mmol/L    Chloride 108 98 - 112 mmol/L    CO2 24.0 21.0 - 32.0 mmol/L    Anion Gap 11 0 - 18 mmol/L    BUN 19 9 - 23 mg/dL    Creatinine 0.82 0.55 - 1.02 mg/dL    Calcium, Total 9.2 8.7 - 10.6 mg/dL    Calculated Osmolality 298 (H) 275 - 295 mOsm/kg    eGFR-Cr 81 >=60 mL/min/1.73m2    Patient Fasting for BMP? No      Radiology  [facility]  Department of Radiology  [address] [place] [address] [address] [place] [id number] [phone]      Name: Dalila Marie \"Maddie\"  Ord Dr: Clara Huerta MD  : 1960    Age/Sex: 64 year old Female  Pt. Phone: [phone]  Exam Date: 10/01/2024  Procedure: MRI BREAST (W+WO) W/CAD BILAT (CPT=77049)   -----------------------------------------------------------------------------------------------------------------------------------------------                  Clara Huerta MD  [address] [place] [address]  [place] [address]      Interpretation   PROCEDURE:  MRI BREAST (W+WO) W/CAD BILAT (CPT=77049)     COMPARISON:  [facility] , MG, BRAVO SHAQ 2D+3D DIAGNOSTIC [facility]  BILAT (XUU=[id number]), 3/29/2024, 10:30 AM.     INDICATIONS:  Z17.0 Malignant neoplasm of lower-outer quadrant of left breast of female, estrogen receptor positive (HCC) C50.512 Malignant neoplasm of lower-outer quadrant *     64-year-old woman with both personal  and family history of breast malignancy presents for high risk screening breast MRI.     She has a history of left breast malignancy status post lumpectomy in September of 2021, also treated with radiation therapy.     She has a family history of breast malignancy in her sister diagnosed at age 40 and a niece at age 39. She is currently taking anastrozole.  No current breast related complaints.      TECHNIQUE:  The breasts were imaged using dynamic intravenous gadolinium infusion, thin sections, and a dedicated breast coil.  Bilateral pre-contrast axial 2D/3D T2 weighted and 3D T1 weighted VIBRANT sequences were obtained with and without fat   suppression. Post contrast VIBRANT 3D T1 weighted images after IV gadolinium were obtained. 1.6 mm slice reconstruction of 3D data sets with additional maximum intensity projects, subtraction, graphic, and kinetic analysis were performed from source   data.     PATIENT STATED HISTORY:(As transcribed by Technologist)  Patient states yearly surveillance scans for her left breast.      CONTRAST USED:  14 mL of Dotarem     FINDINGS:    There are scattered areas of fibroglandular tissue.  There is minimal background parenchymal enhancement.     Benign post lumpectomy changes without enhancement are seen in the lower outer left breast.  Susceptibility artifact from a biopsy clip is seen in the upper central right breast.  There is no enhancement at the site.     There are no dominant or suspicious areas of enhancement in either breast.  There is no internal mammary or axillary adenopathy.                   =====  CONCLUSION:    Benign post lumpectomy changes of the left breast.     No MRI findings suspicious for malignancy in either breast.     The patient will be due for annual screening mammogram in 6 months.     Given high risk status, recommend consideration of continued annual supplemental screening breast MRI, due in 1 year.     BI-RADS CATEGORY:  DIAGNOSTIC CATEGORY 2 -  BENIGN FINDING:       RECOMMENDATIONS:    SCREENING MRI OF THE BREAST IN ONE YEAR       PLEASE NOTE:  A NORMAL MRI DOES NOT EXCLUDE THE POSSIBILITY OF BREAST CANCER.  A CLINICALLY SUSPICIOUS PALPABLE LUMP SHOULD BE BIOPSIED.  CORRELATION WITH CLINICAL EXAM AND OTHER IMAGING STUDIES IS RECOMMENDED.        LOCATION:  [place]        Dictated by (CST): Afia Queen MD on 10/02/2024 at 12:03 PM       Finalized by (CST): Afia Queen MD on 10/02/2024 at 12:08 PM            Impression and Plan   1.   Invasive ductal carcinoma, left breast: Tumor was estrogen and progesterone receptor positive and Her2 negative. Patient is staged as IA. She has undergone lumpectomy and sentinel lymph node biopsy. OncotypeDX score was 19 and chemotherapy was not pursued. She received adjuvant radiation therapy.          As her tumor is hormone receptor positive, adjuvant endocrine therapy is recommended for at least 5 years. Continue tamoxifen without modification.           Patient's breast imaging is managed by Dr. Huerta.          Survivorship issues were addressed with the patient. No issues were identified by the patient.      2.   Osteopenia: Zoledronic acid today. Bone density next due in 07/2025; order provided to patient.     3.   Right sided abdominal pain: May be related to constipation. Recommend she use Miralax and enemas to resolve the constipation. If her pain persists or her constipation recurs, she is asked to notify me.     Planned Follow Up   Patient will return for follow up in 6 months.    Electronically Signed by:     Monroe Mcdowell M.D.  System Medical Director, Oncology Services  [place] [facility]

## 2025-02-04 ENCOUNTER — OFFICE VISIT (OUTPATIENT)
Age: 65
End: 2025-02-04
Attending: GENETIC COUNSELOR, MS
Payer: COMMERCIAL

## 2025-02-04 VITALS
BODY MASS INDEX: 24.83 KG/M2 | SYSTOLIC BLOOD PRESSURE: 127 MMHG | TEMPERATURE: 97 F | HEIGHT: 67.01 IN | RESPIRATION RATE: 18 BRPM | HEART RATE: 71 BPM | OXYGEN SATURATION: 98 % | WEIGHT: 158.19 LBS | DIASTOLIC BLOOD PRESSURE: 73 MMHG

## 2025-02-04 DIAGNOSIS — N60.92 ATYPICAL DUCTAL HYPERPLASIA OF LEFT BREAST: ICD-10-CM

## 2025-02-04 DIAGNOSIS — C50.512 MALIGNANT NEOPLASM OF LOWER-OUTER QUADRANT OF LEFT BREAST OF FEMALE, ESTROGEN RECEPTOR POSITIVE (HCC): ICD-10-CM

## 2025-02-04 DIAGNOSIS — R10.9 RIGHT SIDED ABDOMINAL PAIN: ICD-10-CM

## 2025-02-04 DIAGNOSIS — K59.00 CONSTIPATION, UNSPECIFIED CONSTIPATION TYPE: ICD-10-CM

## 2025-02-04 DIAGNOSIS — Z79.810 ENCOUNTER FOR MONITORING TAMOXIFEN THERAPY: ICD-10-CM

## 2025-02-04 DIAGNOSIS — Z79.810 LONG-TERM CURRENT USE OF TAMOXIFEN: Primary | ICD-10-CM

## 2025-02-04 DIAGNOSIS — Z17.0 CARCINOMA OF LOWER-OUTER QUADRANT OF LEFT BREAST IN FEMALE, ESTROGEN RECEPTOR POSITIVE (HCC): ICD-10-CM

## 2025-02-04 DIAGNOSIS — Z51.81 ENCOUNTER FOR MONITORING TAMOXIFEN THERAPY: ICD-10-CM

## 2025-02-04 DIAGNOSIS — Z80.3 FAMILY HISTORY OF BREAST CANCER IN SISTER: ICD-10-CM

## 2025-02-04 DIAGNOSIS — E78.00 HYPERCHOLESTEROLEMIA: Primary | ICD-10-CM

## 2025-02-04 DIAGNOSIS — M85.80 OSTEOPENIA AFTER MENOPAUSE: ICD-10-CM

## 2025-02-04 DIAGNOSIS — Z17.0 MALIGNANT NEOPLASM OF LOWER-OUTER QUADRANT OF LEFT BREAST OF FEMALE, ESTROGEN RECEPTOR POSITIVE (HCC): ICD-10-CM

## 2025-02-04 DIAGNOSIS — C50.912 INVASIVE DUCTAL CARCINOMA OF BREAST, FEMALE, LEFT (HCC): ICD-10-CM

## 2025-02-04 DIAGNOSIS — C50.512 CARCINOMA OF LOWER-OUTER QUADRANT OF LEFT BREAST IN FEMALE, ESTROGEN RECEPTOR POSITIVE (HCC): ICD-10-CM

## 2025-02-04 DIAGNOSIS — Z08 ENCOUNTER FOR FOLLOW-UP SURVEILLANCE OF BREAST CANCER: ICD-10-CM

## 2025-02-04 DIAGNOSIS — Z85.3 ENCOUNTER FOR FOLLOW-UP SURVEILLANCE OF BREAST CANCER: ICD-10-CM

## 2025-02-04 DIAGNOSIS — Z78.0 OSTEOPENIA AFTER MENOPAUSE: ICD-10-CM

## 2025-02-04 NOTE — PROGRESS NOTES
Education Record    Learner:  Patient    Disease / Diagnosis:    Barriers / Limitations:  None   Comments:    Method:  Discussion   Comments:    General Topics:  Plan of care reviewed   Comments:    Outcome:  Shows understanding   Comments:    Patient here for MD and zometa - patient tolerated without issue and left in stable condition. Future appointments requested.

## 2025-02-04 NOTE — PROGRESS NOTES
Patient is her for MD f/u and Zometa infusion. Patient c/o pain in right side of abdomen since Saturday. No nausea or vomiting. No diarrhea. Mild constipation since pain started. Patient is on Tamoxifen daily. Mammogram due in March and MRI breast in October.     Education Record    Learner:  Patient    Disease / Diagnosis:  Breast cancer    Barriers / Limitations:  None   Comments:    Method:  Discussion   Comments:    General Topics:  Plan of care reviewed   Comments:    Outcome:  Shows understanding   Comments:

## 2025-02-06 ENCOUNTER — APPOINTMENT (OUTPATIENT)
Dept: HEMATOLOGY/ONCOLOGY | Facility: HOSPITAL | Age: 65
End: 2025-02-06
Attending: GENETIC COUNSELOR, MS
Payer: COMMERCIAL

## 2025-02-10 RX ORDER — TAMOXIFEN CITRATE 20 MG/1
20 TABLET ORAL DAILY
Qty: 90 TABLET | Refills: 1 | Status: SHIPPED | OUTPATIENT
Start: 2025-02-10

## 2025-03-27 ENCOUNTER — HOSPITAL ENCOUNTER (OUTPATIENT)
Dept: MAMMOGRAPHY | Facility: HOSPITAL | Age: 65
Discharge: HOME | End: 2025-03-27
Attending: SURGERY
Payer: COMMERCIAL

## 2025-03-27 DIAGNOSIS — Z12.31 SCREENING MAMMOGRAM, ENCOUNTER FOR: ICD-10-CM

## 2025-03-27 PROCEDURE — 77067 SCR MAMMO BI INCL CAD: CPT | Performed by: SURGERY

## 2025-03-27 PROCEDURE — 77063 BREAST TOMOSYNTHESIS BI: CPT | Performed by: SURGERY

## 2025-04-08 ENCOUNTER — PATIENT MESSAGE (OUTPATIENT)
Dept: OBGYN CLINIC | Facility: CLINIC | Age: 65
End: 2025-04-08

## 2025-04-09 DIAGNOSIS — Z79.810 CARE RELATED TO CURRENT TAMOXIFEN USE: Primary | ICD-10-CM

## 2025-04-09 NOTE — TELEPHONE ENCOUNTER
Pt called back. Below instructions regarding US & EMB discussed with pt. Advised pt to call office back with any additional questions or concerns. Pt expresses understanding.

## 2025-04-09 NOTE — TELEPHONE ENCOUNTER
Please see pt message & advise if pt needs OV or monitor & follow up if it re-occurs since it was only once? LOV 1/8/25

## 2025-04-09 NOTE — TELEPHONE ENCOUNTER
Poli Guajardo MD to Me  Reynold [facility] Nurse  [facility]        4/9/25  9:18 AM  Please inform patient that the endometrial lining can become thicker while on tamoxifen and she can develop benign endometrial polyps. I have ordered a pelvic ultrasound for her to complete with the radiology department and we can also get her scheduled for an endometrial biopsy given an increased risk of endometrial carcinoma.      Ashlee - Please review EMB preparation with ibuprofen with patient and the above recommendations with preference to obtain ultrasound prior to EMB.      PSRs - Please schedule patient for EMB with me in the next 1-2 weeks (30 minute appointment), ok to schedule in acute spot or gyne consult spot. If there is difficulty with scheduling, please contact me and I will find a date in my schedule.         Ultrasound:    An ultrasound is like a special camera that lets us see inside your body. It can show us things like cysts (small fluid-filled sacs), fibroids (non-cancerous growths), or a thickened lining of the uterus. But it can’t always show smaller things like benign (non-cancerous) polyps that might cause the bleeding.    Endometrial Biopsy:    An endometrial biopsy is when we take a tiny sample from the endometrium (lining of your uterus). This sample gives us more detailed information.  It helps us find out if there’s something serious causing the bleeding, like abnormal endometrial cells or cancer, which an ultrasound can’t always show.    Why We Do Both:    Using both pelvic ultrasound and endometrial biopsy helps us get a complete picture. This way, we can make the best plan to further assist you.    Most results show non-cancerous reasons for the abnormal uterine bleeding, but it’s still very important to check everything carefully. Below is more information on the procedure and reasons for it.      Endometrial Biopsy  Endometrial biopsy is a procedure used to study the lining of  the uterus. The lining of the uterus is also called the endometrium. The biopsy is usually done in your healthcare provider’s office. During the biopsy, small tissue samples are taken from inside the uterus. These are then sent to a lab for study. If any problems are found, you and your healthcare provider will discuss treatment choices. The biopsy usually takes only a few minutes, and you can often go back to your normal routine as soon as the procedure is over.   Reasons for the procedure  Endometrial biopsy may help pinpoint the cause of certain problems. These include:   Abnormal Pap test results  Bleeding after menopause  Bleeding linked to hormone therapy  Having certain types of cancer  Heavy or irregular menstrual periods  Prolonged bleeding  Trouble getting pregnant (fertility problems)  Damage to the uterine wall (very rare)  What are the risks?  Problems with endometrial biopsy are rare, but can include:   Bleeding  Damage to the uterine wall (very rare)  Infection  Getting ready for the procedure  Your healthcare provider will ask about your health and any medicines you take, like blood thinners. Before your biopsy, you may have tests to make sure you’re not pregnant or have an infection. You may also be asked to sign a consent form. You should do the following 1 to 2 days before the biopsy:   Don't use creams or other vaginal medicines.  Don't douche.  Ask your healthcare provider if you should take pain medicines shortly before the test.  During the biopsy  During the biopsy, you will likely experience the following:   You will be asked to lie on an exam table with your knees bent, just as you do for a Pap test.  You may have a brief pelvic exam. An instrument called a speculum is then inserted into the vagina to hold it open.  An antiseptic solution may be applied to the cervix. The cervix may also be numbed with an anesthetic or dilated to widen the opening.  A small tube is passed through the cervix  into the uterus.  It is normal to feel some cramping when the tube is inserted. But tell your healthcare provider if you have severe cramping or are very uncomfortable.  Using mild suction, samples are taken from the uterine lining. You may feel pinching or additional cramping when this is done.  The tube and speculum are then removed, and the samples are sent to a lab for study.    After the procedure  After the procedure, you may experience the following:   If you feel lightheaded or dizzy, you can rest on the table until you’re ready to get dressed.  For a few hours, you may feel some mild cramping. This can usually be relieved with over-the-counter pain medicines.  You may have some bleeding for a few days. Use pads instead of tampons.  Don’t douche or use any vaginal medicines unless your healthcare provider says it’s OK.  Ask your healthcare provider when it’s OK to have sex again.  Follow-up care  It will take about a week for the biopsy results to come back from the lab. Then you and your healthcare provider can discuss the results. These may show that no treatment is needed. Or you may be scheduled for a follow-up appointment and more tests. If your biopsy was done for fertility problems, be sure to record the day when your next period begins.   Call your healthcare provider   Call your healthcare provider if any of the following occur:   Fever of 100.4°F (38°C) or higher, or as directed by your healthcare provider  Foul-smelling or unusual vaginal discharge  Heavy bleeding (soaking more than 1 pad an hour for 2 hours)  Severe cramping or increasing pain  Gay last reviewed this educational content on 8/1/2022 © 2000-2024 [employer]. All rights reserved. This information is not intended as a substitute for professional medical care. Always follow your healthcare professional's instructions.

## 2025-04-23 ENCOUNTER — HOSPITAL ENCOUNTER (OUTPATIENT)
Dept: ULTRASOUND IMAGING | Age: 65
Discharge: HOME | End: 2025-04-23
Attending: OBSTETRICS & GYNECOLOGY
Payer: COMMERCIAL

## 2025-04-23 DIAGNOSIS — Z79.810 CARE RELATED TO CURRENT TAMOXIFEN USE: ICD-10-CM

## 2025-04-23 PROCEDURE — 76856 US EXAM PELVIC COMPLETE: CPT | Performed by: OBSTETRICS & GYNECOLOGY

## 2025-04-23 PROCEDURE — 76830 TRANSVAGINAL US NON-OB: CPT | Performed by: OBSTETRICS & GYNECOLOGY

## 2025-04-28 ENCOUNTER — OFFICE VISIT (OUTPATIENT)
Dept: OBGYN CLINIC | Facility: CLINIC | Age: 65
End: 2025-04-28
Payer: COMMERCIAL

## 2025-04-28 VITALS
HEIGHT: 67 IN | DIASTOLIC BLOOD PRESSURE: 64 MMHG | WEIGHT: 156.13 LBS | BODY MASS INDEX: 24.51 KG/M2 | SYSTOLIC BLOOD PRESSURE: 126 MMHG | HEART RATE: 76 BPM

## 2025-04-28 DIAGNOSIS — N95.0 POSTMENOPAUSAL BLEEDING: Primary | ICD-10-CM

## 2025-04-28 NOTE — PROCEDURES
Procedure: Endometrial biopsy     Date of Procedure: 25    Pre-procedure diagnosis:  PMB  Tamoxifen usage     Post-procedure diagnosis:   PMB  Tamoxifen usage     Indications:   64 year old female  who presents for endometrial biopsy 2/2 spotting in postmenopausal patient on tamoxifen. Pelvic ultrasound demonstrated ET up to 10 mm that was heterogenous in appearance with no discrete lesions.     Procedure details:  The procedure, risks, benefits and alternatives were discussed with the patient. The patient was informed of risks including but not limited to the risk of bleeding, infection, injury and insufficient tissue collection. All questions and concerns were addressed. The patient provided verbal and written consent.     The patient was placed in a supine position with feet positioned into stirrups. A sterile speculum was placed into the vagina and the cervix was visualized with findings noted below. The cervix was cleaned and prepped with betadine.  Lidocaine gel followed by  an Charity was placed on the anterior lip of the cervix. The cervix was flush with the posterior uterine wall and dilators used but unable to pass after 4 attempts through stenotic cervix. Allis removed from cervix. Procedure was discontinued. All instruments were removed. Good hemostasis noted. The patient tolerated the procedure well.     Findings:   Stenotic cervix, no lesions   Unable to dilate cervix in office for procedure.     Disposition: Stable    Complications: None    Follow up:  Discussed repeat EMB after cytotec administration vs. hysteroscopy D&C for endometrial sampling for suspected uterine polyp on tamoxifen therapy. Patient opted for hysteroscopy and hysteroscopic sampling attempt.     Surgical consent:    We discussed the risks and benefits of the procedure. Risks included but not limited to bleeding, infection, and injury to the surrounding organs including bowel, bladder, uterus, fallopian tubes, and ovaries.  Reviewed the major risk of uterine perforation. Reviewed the risk of hemorrhage. We discussed the risk of hysterectomy if there was bleeding present unable to be controlled with medications or stitches. Reviewed the risk of anesthetic complications including stroke, MI, and death. Patient expressed understanding of all the risks involved. All questions were answered, and patient consented to the procedure. Request sent to schedule procedure.       Poli Guajardo MD   [facility] - OBGYN      Note to patient and family   The 21st Century Cures Act makes medical notes available to patients in the interest of transparency.  However, please be advised that this is a medical document.  It is intended as lthe-xp-qjic communication.  It is written and medical language may contain abbreviations or verbiage that are technical and unfamiliar.  It may appear blunt or direct.  Medical documents are intended to carry relevant information, facts as evident, and the clinical opinion of the practitioner.

## 2025-05-01 ENCOUNTER — TELEPHONE (OUTPATIENT)
Dept: OBGYN CLINIC | Facility: CLINIC | Age: 65
End: 2025-05-01

## 2025-05-01 DIAGNOSIS — N95.0 POSTMENOPAUSAL BLEEDING: Primary | ICD-10-CM

## 2025-05-01 NOTE — TELEPHONE ENCOUNTER
Surgery scheduled for 5/13/25 at 12  Post op   Future Appointments   Date Time Provider Department Center   6/2/2025 11:30 AM Poli Guajardo MD [facility] OB[facility]N N [facility] [place]   7/16/2025  8:00 AM Clara Huerta MD [facility] S[facility]   7/16/2025 10:40 AM Kaitlynn Maynard, PA-C [facility] [address]   7/21/2025 10:20 AM HOB DEXA RM1 HOB D[facility]A [place]   8/5/2025 11:00 AM Christel Holloway DO [facility] None   8/5/2025  1:45 PM Monroe Mcdowell MD NP [facility] [place] [facility]   8/5/2025  2:00 PM NP TX RN1 NP SW [facility] [place] [facility]     Orders entered  Added to calendar  Sent letter to PCP  PA - will work on

## 2025-05-01 NOTE — TELEPHONE ENCOUNTER
----- Message from Poli Guajardo sent at 4/28/2025  3:24 PM CDT -----  Regarding: Hysteroscopy D&C  Surgeon: Dr. Poli Guajardo Assistant: none Type of Admit: Hospital outpatient, does not require admission following surgery Procedure Location: Main ORPreOp Dx: Postmenopausal spotting on tamoxifen Procedure: Dilation, hysteroscopy, hysteroscopic endometrial sampling and curettage Anesthesia: MACSpecial Equipment/Comments: [employer] TRUCLEAR hysteroscopy with blades Antibiotics: NonePre Op Orders: initiate my Pre-op standing orders, if none exist please use [facility]'s Standing Order Labs: per protocol Medical clearance: YES Patient would prefer before June 5th trip to [place] but can also do after June 20th if needed.

## 2025-05-02 NOTE — TELEPHONE ENCOUNTER
Status  No Action Required  Reference Number  [id number]  Transaction ID  [id number]    CPT 71438

## 2025-05-11 NOTE — H&P
[facility]  Obstetrics and Gynecology  History & Physical    Dalila Marie Patient Status:  Hospital Outpatient Surgery    1960 MRN [id number]   Location [facility] SURGERY Attending Poli Guajardo MD   Hospital Day 0 PCP Reed Dixon MD     CC: Patient is here for scheduled procedure    SUBJECTIVE:    Dalila Marie is a 64 year old  female who presents for scheduled procedure.     The patient was seen in office for discussion regarding postmenopausal bleeding and tamoxifen usage.  A discussion was held with the patient regarding findings and recommendations. The patient was offered surgical management. The patient was agreeable with recommendations and presents today for scheduled procedure.     Obstetric History:   OB History    Para Term  AB Living   2 2 2 0 0 2   SAB IAB Ectopic Multiple Live Births   0 0 0 0 2      # Outcome Date GA Lbr Kunal/2nd Weight Sex Type Anes PTL Lv   2 Term 99 39w0d  9 lb 5 oz (4.224 kg) F CS-LTranv EPI N HENRY   1 Term  39w0d  7 lb 15 oz (3.6 kg) M CS-LTranv   HENRY      Obstetric Comments   Menarche: 16 y/o   Menopause: 56 y/o   OCP use x 2 yrs   Fertility treatment for 4 yrs   No HRT use   Breastfeed: YES   BRA SIZE: 36B/C     Past Medical History: Past Medical History[1]  Past Surgical History: Past Surgical History[2]  Family History: Family History[3]  Social History:   Social History     Tobacco Use    Smoking status: Never    Smokeless tobacco: Never   Substance Use Topics    Alcohol use: No     Alcohol/week: 0.0 standard drinks of alcohol       Home Meds: Prescriptions Prior to Admission[4]  Allergies: Allergies[5]    OBJECTIVE:    Temp:  [97.7 °F (36.5 °C)] 97.7 °F (36.5 °C)  Pulse:  [63] 63  Resp:  [16] 16  BP: (121)/(60) 121/60  SpO2:  [99 %] 99 %  Body mass index is 24.88 kg/m².    General: AAO. NAD.   Lungs: CTAB.   CV: RRR.  Abdomen: soft, nontender, nondistended, +BS  Gu: deferred to OR  Extremities: negative  edema bilaterally, negative calf tenderness bilaterally, Luna's sign negative bilaterally     Imaging:  PROCEDURE:  US PELVIS (TRANSABDOMINAL AND TRANSVAGINAL) (CPT=76856/58016)     COMPARISON:  [facility], US PELVIS (TRANSABDOMINAL AND TRANSVAGINAL) (CPT=76856/83896), 12/30/2015, 3:26 PM.     INDICATIONS:  Z79.810 Care related to current tamoxifen use     TECHNIQUE:  Pelvic ultrasound using transabdominal and endovaginal technique.  Transvaginal ultrasound was used to better evaluate adnexal and endometrial detail.     PATIENT STATED HISTORY: (As transcribed by Technologist)  Patient states she has been spotting for two weeks that seems to be improving, but now has pelvic pressure.         FINDINGS:                UTERUS:  7.55 cm x 2.68 cm x 4.31 cm    Endometrium Thickness: 0.97 cm, 0.81 cm    The endometrial stripe appears thickened and heterogeneous, measuring up to 10 mm on some images.  A discrete lesion is not clearly identified.  Possible nabothian cysts are present in the region of the cervix.  These were also noted previously.  RIGHT OVARY:  2.07 cm, 2.00 cm x 0.89 cm, 1.13 cm x 1.74 cm, 1.38 cm    The right ovary appears normal in size, shape, and echogenicity. No significant masses are identified.  LEFT OVARY:  1.95 cm x 1.15 cm x 1.64 cm    The left ovary appears normal in size, shape, and echogenicity. No significant masses are identified.  CUL-DE-SAC:  Normal.  No fluid or mass.    OTHER:  The exam is somewhat limited due to difficulty with voiding.                     Impression  CONCLUSION:  The endometrial stripe is thickened, measuring up to 10 mm in a postmenopausal patient.  A discrete lesion is not clearly identified, however correlation with direct visualization and/or tissue sampling would be recommended.        LOCATION:  [place]        Dictated by (CST): Rommel Castro MD on 4/23/2025 at 5:05 PM      Finalized by (CST): Rommel Castro MD on 4/23/2025 at 5:08 PM      ASSESSMENT/  PLAN:    Dalila Marie is a 64 year old  female who presents for scheduled procedure.     Future Appointments   Date Time Provider Department Center   2025 11:30 AM Poli Guajardo MD [facility] OB[facility]N N [facility] [place]   2025  8:00 AM Clara Huerta MD [facility] S[facility]   2025 10:40 AM Kaitlynn Maynard PA-C [facility] [address]   2025 10:20 AM HOB DEXA RM1 HOB DEXA [place]   2025 11:00 AM Christel Holloway DO [facility] None   2025  1:45 PM Monroe Mcdowell MD NP SW [facility] [place] [facility]   2025  2:00 PM NP TX RN1 NP SW Inf [place] [facility]         Problem List Items Addressed This Visit    None      Outpatient Surgial Management  - Admit for outpatient surgical procedure   - IVF: LR @ 100 cc/hr   - Diet: NPO  - Meds: none  - VTE prophylaxis: SCDs, per protocol   - Anesthesia to evaluate   - Consent obtained and placed in chart     Risks, benefits, alternatives and possible complications have been discussed in detail with the patient.  Pre-admission, admission, and post admission procedures and expectations were discussed in detail.  All questions answered, all appropriate consents will be signed at the Hospital. Previously stated procedure is planned for today and anticipated    Poli Guajardo MD   [facility] - OBGYN      Note to patient and family   The 21st Century Cures Act makes medical notes available to patients in the interest of transparency.  However, please be advised that this is a medical document.  It is intended as utlw-ys-mduz communication.  It is written and medical language may contain abbreviations or verbiage that are technical and unfamiliar.  It may appear blunt or direct.  Medical documents are intended to carry relevant information, facts as evident, and the clinical opinion of the practitioner.                [1]   Past Medical History:   Amenorrhea    Anemia    as a child    Anesthesia complication    too much epidural with 1st  was paralyzed  from jaw down    Atypical ductal hyperplasia of left breast    Breast cancer (HCC)    left breast ca    Cancer (HCC)    breast (L)    Ductal carcinoma in situ of breast    left breast    Esophageal reflux    Exposure to medical diagnostic radiation    Last treatment 2021    H/O mammogram    negative     HEADACHES    High cholesterol    History of shingles    HYPERLIPIDEMIA    Infertility, female    Pap smear for cervical cancer screening    WNL -- for years , , , , , , , , , , , ,     Visual impairment    Glasses reading   [2]   Past Surgical History:  Procedure Laterality Date    Biopsy of breast, incisional Left 2015    Atypical Ductal Hyperplasia      ,1999    x 2    Laparoscopy,diagnostic   and     Lumpectomy left  2021    IDC;DCIS    Kasey biopsy stereo nodule 2 site bilat (cpt=19081/52417)  2015    atypia    Kasey localization wire 1 site left (cpt=19281)  2015    Atypia    Needle biopsy left  2021    MRI bx- DCIS    Needle biopsy right  2016    BENIGN   MRI BX    Other accessory Left 2023    rotator cuff repair    Radiation left     [3]   Family History  Problem Relation Age of Onset    Heart Disorder Father     Hypertension Father     Lipids Mother     Lung Disorder Mother         COPD-previou smoker    Thyroid disease Mother     Cancer Sister         breast    Breast Cancer Sister 40        two times @47 y/o    Heart Disorder Brother     Hypertension Brother     Seizure Disorder Brother     Breast Cancer Self 61    DCIS Self 61    Breast Cancer Niece 39   [4]   Medications Prior to Admission   Medication Sig Dispense Refill Last Dose/Taking    famotidine 20 MG Oral Tab Take 1 tablet (20 mg total) by mouth 2 (two) times daily.   2025 at  8:00 AM    TAMOXIFEN 20 MG Oral Tab TAKE 1 TABLET BY MOUTH EVERY DAY 90 tablet 1 2025    metFORMIN  MG Oral Tablet 24 Hr Take 2 tablets (1,000 mg total) by  mouth daily. 180 tablet 1 5/3/2025    REPLENS VAGINAL MOISTURIZER VA Place vaginally.   Taking    rosuvastatin 20 MG Oral Tab Take 1 tablet (20 mg total) by mouth nightly.   5/12/2025    Calcium 200 MG Oral Tab Take 600 Units by mouth in the morning.   5/12/2025    omega-3 fatty acids 1000 MG Oral Cap Take 1,000 mg by mouth in the morning.   5/12/2025    Cholecalciferol (VITAMIN D3) 1000 UNITS Oral Cap Take 1 tablet by mouth in the morning.   5/12/2025    MULTI-VITAMIN OR Take by mouth.   5/12/2025   [5]   Allergies  Allergen Reactions    Trimethoprim RASH

## 2025-05-11 NOTE — DISCHARGE INSTRUCTIONS
Home Care Instructions Following Your Hysteroscopy     SANG -   We hope you were pleased with your care at [facility].  We wish you the best outcome and overall experience with your operation.  These instructions will help to minimize pain, limit the risk for infection, and improve the likelihood of a successful result.    What to Expect:  Expect some slight vaginal bleeding for 1-2 weeks after your procedure.  Use pads only, No tampons  You may have mild abdominal cramping  Contact the office if you are experiencing heavy bleeding >1 pad/hour (completely filled) for 2 consecutive hours OR are feeling symptomatic (listed below)    Bathing/Showers  You may resume showers in 1 day  No baths, swimming, or hot tubs for 2 weeks    Pain Medication if Prescribed  You may take Tylenol or ibuprofen  Recommend to alternate between Tylenol and iburprofen: 2-3 tablets of iburprofen (400-600 mg) by mouth every 6 hours as needed for pain along with Tylenol (500 mg to 1000 mg) by mouth every 6 hours as needed for pain. Therefore, you will be able to take oral pain medication every 3 hours as needed for pain.  Drink a full glass of water with the medication  Maximum amount of tylenol is 4000 mg in 24 hours     Home Medication  Resume your home medications as instructed    Diet  Resume your normal diet    Activity  No strenuous activity or heavy lifting for 2 weeks  You may go up and down the stairs as tolerated  No physical exercise, sports, or gym workouts for 2 weeks  No sexual activity for 2 weeks    Return to Work or School  Contact your gynecologist's office if you need a medical note    Driving  Do not drive if you are taking prescribed pain medication    Follow-up Appointment with Your Gynecologist  Call your gynecologist's office as soon as possible to schedule a follow up appointment in two weeks    Verify your appointment date, day, time, and location  At your postoperative appointment, your wounds will be  evaluated, healing assessed, and any additional concerns and instructions will be discussed    Questions or Concerns  Call the office if you experience severe pain not controlled by pain medication, swelling, heavy bleeding, foul smelling vaginal discharge, shortness of breath, chest pain, leg pain, fever (100.4 or greater), or other concerns  The number is: [phone]  If your call is made after office hours, the physician on call will be available to help you.  There is always a doctor from the group covering our gynecology patients, when your provider is unavailable      Thank you for coming to [facility] for your operation.  The nurses and the anesthesiologist try very hard to make sure you receive the best care possible.  Your trust in them as well as us is greatly appreciated.    Thanks so much,  Dr. Poli Guajardo  [facility]

## 2025-05-13 ENCOUNTER — ANESTHESIA (OUTPATIENT)
Dept: SURGERY | Facility: HOSPITAL | Age: 65
End: 2025-05-13
Payer: COMMERCIAL

## 2025-05-13 ENCOUNTER — HOSPITAL ENCOUNTER (OUTPATIENT)
Facility: HOSPITAL | Age: 65
Setting detail: HOSPITAL OUTPATIENT SURGERY
Discharge: HOME | End: 2025-05-13
Attending: OBSTETRICS & GYNECOLOGY | Admitting: OBSTETRICS & GYNECOLOGY
Payer: COMMERCIAL

## 2025-05-13 ENCOUNTER — ANESTHESIA EVENT (OUTPATIENT)
Dept: SURGERY | Facility: HOSPITAL | Age: 65
End: 2025-05-13
Payer: COMMERCIAL

## 2025-05-13 VITALS
BODY MASS INDEX: 24.92 KG/M2 | OXYGEN SATURATION: 98 % | WEIGHT: 158.81 LBS | TEMPERATURE: 98 F | RESPIRATION RATE: 16 BRPM | HEIGHT: 67 IN | HEART RATE: 65 BPM | SYSTOLIC BLOOD PRESSURE: 102 MMHG | DIASTOLIC BLOOD PRESSURE: 53 MMHG

## 2025-05-13 DIAGNOSIS — N95.0 POSTMENOPAUSAL BLEEDING: ICD-10-CM

## 2025-05-13 PROCEDURE — 58558 HYSTEROSCOPY BIOPSY: CPT | Performed by: OBSTETRICS & GYNECOLOGY

## 2025-05-13 RX ORDER — LIDOCAINE HYDROCHLORIDE 10 MG/ML
INJECTION, SOLUTION EPIDURAL; INFILTRATION; INTRACAUDAL; PERINEURAL AS NEEDED
Status: DISCONTINUED | OUTPATIENT
Start: 2025-05-13 | End: 2025-05-13 | Stop reason: SURG

## 2025-05-13 RX ORDER — HYDROMORPHONE HYDROCHLORIDE 1 MG/ML
0.6 INJECTION, SOLUTION INTRAMUSCULAR; INTRAVENOUS; SUBCUTANEOUS EVERY 5 MIN PRN
Status: DISCONTINUED | OUTPATIENT
Start: 2025-05-13 | End: 2025-05-13

## 2025-05-13 RX ORDER — HYDROMORPHONE HYDROCHLORIDE 1 MG/ML
0.2 INJECTION, SOLUTION INTRAMUSCULAR; INTRAVENOUS; SUBCUTANEOUS EVERY 5 MIN PRN
Status: DISCONTINUED | OUTPATIENT
Start: 2025-05-13 | End: 2025-05-13

## 2025-05-13 RX ORDER — HYDROMORPHONE HYDROCHLORIDE 1 MG/ML
0.4 INJECTION, SOLUTION INTRAMUSCULAR; INTRAVENOUS; SUBCUTANEOUS EVERY 5 MIN PRN
Status: DISCONTINUED | OUTPATIENT
Start: 2025-05-13 | End: 2025-05-13

## 2025-05-13 RX ORDER — SODIUM CHLORIDE, SODIUM LACTATE, POTASSIUM CHLORIDE, CALCIUM CHLORIDE 600; 310; 30; 20 MG/100ML; MG/100ML; MG/100ML; MG/100ML
INJECTION, SOLUTION INTRAVENOUS CONTINUOUS
Status: DISCONTINUED | OUTPATIENT
Start: 2025-05-13 | End: 2025-05-13

## 2025-05-13 RX ORDER — ACETAMINOPHEN 500 MG
1000 TABLET ORAL ONCE AS NEEDED
Status: DISCONTINUED | OUTPATIENT
Start: 2025-05-13 | End: 2025-05-13

## 2025-05-13 RX ORDER — ONDANSETRON 2 MG/ML
4 INJECTION INTRAMUSCULAR; INTRAVENOUS EVERY 6 HOURS PRN
Status: DISCONTINUED | OUTPATIENT
Start: 2025-05-13 | End: 2025-05-13

## 2025-05-13 RX ORDER — KETOROLAC TROMETHAMINE 30 MG/ML
INJECTION, SOLUTION INTRAMUSCULAR; INTRAVENOUS AS NEEDED
Status: DISCONTINUED | OUTPATIENT
Start: 2025-05-13 | End: 2025-05-13 | Stop reason: SURG

## 2025-05-13 RX ORDER — DEXAMETHASONE SODIUM PHOSPHATE 4 MG/ML
VIAL (ML) INJECTION AS NEEDED
Status: DISCONTINUED | OUTPATIENT
Start: 2025-05-13 | End: 2025-05-13 | Stop reason: SURG

## 2025-05-13 RX ORDER — FAMOTIDINE 20 MG/1
20 TABLET, FILM COATED ORAL 2 TIMES DAILY
COMMUNITY

## 2025-05-13 RX ORDER — PROCHLORPERAZINE EDISYLATE 5 MG/ML
5 INJECTION INTRAMUSCULAR; INTRAVENOUS EVERY 8 HOURS PRN
Status: DISCONTINUED | OUTPATIENT
Start: 2025-05-13 | End: 2025-05-13

## 2025-05-13 RX ORDER — ONDANSETRON 2 MG/ML
INJECTION INTRAMUSCULAR; INTRAVENOUS AS NEEDED
Status: DISCONTINUED | OUTPATIENT
Start: 2025-05-13 | End: 2025-05-13 | Stop reason: SURG

## 2025-05-13 RX ORDER — ACETAMINOPHEN 500 MG
1000 TABLET ORAL ONCE
Status: DISCONTINUED | OUTPATIENT
Start: 2025-05-13 | End: 2025-05-13 | Stop reason: HOSPADM

## 2025-05-13 RX ORDER — SCOPOLAMINE 1 MG/3D
1 PATCH, EXTENDED RELEASE TRANSDERMAL ONCE
Status: DISCONTINUED | OUTPATIENT
Start: 2025-05-13 | End: 2025-05-13 | Stop reason: HOSPADM

## 2025-05-13 RX ORDER — NALOXONE HYDROCHLORIDE 0.4 MG/ML
0.08 INJECTION, SOLUTION INTRAMUSCULAR; INTRAVENOUS; SUBCUTANEOUS AS NEEDED
Status: DISCONTINUED | OUTPATIENT
Start: 2025-05-13 | End: 2025-05-13

## 2025-05-13 RX ORDER — OXYCODONE HYDROCHLORIDE 5 MG/1
5 TABLET ORAL ONCE AS NEEDED
Status: DISCONTINUED | OUTPATIENT
Start: 2025-05-13 | End: 2025-05-13

## 2025-05-13 RX ADMIN — SODIUM CHLORIDE, SODIUM LACTATE, POTASSIUM CHLORIDE, CALCIUM CHLORIDE: 600; 310; 30; 20 INJECTION, SOLUTION INTRAVENOUS at 12:38:00

## 2025-05-13 RX ADMIN — LIDOCAINE HYDROCHLORIDE 70 MG: 10 INJECTION, SOLUTION EPIDURAL; INFILTRATION; INTRACAUDAL; PERINEURAL at 12:42:00

## 2025-05-13 RX ADMIN — DEXAMETHASONE SODIUM PHOSPHATE 4 MG: 4 MG/ML VIAL (ML) INJECTION at 12:59:00

## 2025-05-13 RX ADMIN — ONDANSETRON 4 MG: 2 INJECTION INTRAMUSCULAR; INTRAVENOUS at 12:59:00

## 2025-05-13 RX ADMIN — KETOROLAC TROMETHAMINE 30 MG: 30 INJECTION, SOLUTION INTRAMUSCULAR; INTRAVENOUS at 13:13:00

## 2025-05-13 NOTE — ANESTHESIA POSTPROCEDURE EVALUATION
[facility]    Dalila Marie Patient Status:  Hospital Outpatient Surgery   Age/Gender 64 year old female MRN [id number]   Location [facility] SURGERY Attending Poli Guajardo MD   Hosp Day # 0 PCP Reed Dixon MD       Anesthesia Post-op Note    TRUCLEAR HYSTEROSCOPY, dilation, hysteroscopic endometrial sampling and curettage    Procedure Summary       Date: 05/13/25 Room / Location:  MAIN OR 05 /  MAIN OR    Anesthesia Start: 1232 Anesthesia Stop: 1328    Procedure: TRUCLEAR HYSTEROSCOPY, dilation, hysteroscopic endometrial sampling and curettage (Uterus) Diagnosis:       Postmenopausal bleeding      (Postmenopausal bleeding [N95.0])    Surgeons: Poli Guajardo MD Anesthesiologist: Wes Martinez DO    Anesthesia Type: MAC ASA Status: 2            Anesthesia Type: MAC    Vitals Value Taken Time   /52 05/13/25 13:28   Temp 97.4 05/13/25 13:28   Pulse 67 05/13/25 13:28   Resp  05/13/25 13:28   SpO2 100 05/13/25 13:28           Patient Location: Same Day Surgery    Anesthesia Type: MAC    Airway Patency: patent    Postop Pain Control: adequate    Mental Status: preanesthetic baseline    Nausea/Vomiting: none    Cardiopulmonary/Hydration status: stable euvolemic    Complications: no apparent anesthesia related complications    Postop vital signs: stable    Dental Exam: Unchanged from Preop    Patient to be discharged from PACU when criteria met.

## 2025-05-13 NOTE — ANESTHESIA PREPROCEDURE EVALUATION
PRE-OP EVALUATION    Patient Name: Dalila Marie    Admit Diagnosis: Postmenopausal bleeding [N95.0]    Pre-op Diagnosis: Postmenopausal bleeding [N95.0]    TRUCLEAR HYSTEROSCOPY, dilation, hysteroscopic endometrial sampling and curettage    Anesthesia Procedure: TRUCLEAR HYSTEROSCOPY, dilation, hysteroscopic endometrial sampling and curettage    Surgeons and Role:     * Poli Guajardo MD - Primary    Pre-op vitals reviewed.  Temp: 97.7 °F (36.5 °C)  Pulse: 63  Resp: 16  BP: 121/60  SpO2: 99 %  Body mass index is 24.88 kg/m².    Current medications reviewed.  Hospital Medications:  Current Medications[1]    Outpatient Medications:   Prescriptions Prior to Admission[2]    Allergies: Trimethoprim      Anesthesia Evaluation    Patient summary reviewed.    Anesthetic Complications  (-) history of anesthetic complications         GI/Hepatic/Renal    Negative GI/hepatic/renal ROS.  (+) GERD and well controlled      (-) chronic renal disease   (-) liver disease                 Cardiovascular    Negative cardiovascular ROS.    Exercise tolerance: good     MET: >4         (+) hyperlipidemia                                  Endo/Other    Negative endo/other ROS.  (-) diabetes     (-) hypothyroidism  (-) hyperthyroidism                     Pulmonary    Negative pulmonary ROS.  (-) asthma  (-) COPD                   Neuro/Psych    Negative neuro/psych ROS.      (-) CVA   (-) TIA  (-) seizures               Patient Active Problem List:     Atypical ductal hyperplasia of left breast     Family history of breast cancer in sister     Hypercholesterolemia     Invasive ductal carcinoma of breast, female, left (HCC)     Carcinoma of lower-outer quadrant of left breast in female, estrogen receptor positive (HCC)     Malignant neoplasm of lower-outer quadrant of left breast of female, estrogen receptor positive (HCC)     Abnormal glandular Papanicolaou smear of cervix            Past Surgical History[3]  Social Hx on  file[4]  History   Drug Use No     Available pre-op labs reviewed.               Airway      Mallampati: II  Mouth opening: >3 FB  TM distance: > 6 cm  Neck ROM: full Cardiovascular    Cardiovascular exam normal.         Dental    Dentition appears grossly intact         Pulmonary    Pulmonary exam normal.                 Other findings              ASA: 2   Plan: MAC  NPO status verified and patient meets guidelines.          Plan/risks discussed with: patient                Present on Admission:  **None**             [1]    [Transfer Hold] acetaminophen (Tylenol Extra Strength) tab 1,000 mg  1,000 mg Oral Once    [Transfer Hold] scopolamine (Transderm-Scop) 1 MG/3DAYS patch 1 patch  1 patch Transdermal Once    lactated ringers infusion   Intravenous Continuous   [2]   Medications Prior to Admission   Medication Sig Dispense Refill Last Dose/Taking    famotidine 20 MG Oral Tab Take 1 tablet (20 mg total) by mouth 2 (two) times daily.   2025 at  8:00 AM    TAMOXIFEN 20 MG Oral Tab TAKE 1 TABLET BY MOUTH EVERY DAY 90 tablet 1 2025    metFORMIN  MG Oral Tablet 24 Hr Take 2 tablets (1,000 mg total) by mouth daily. 180 tablet 1 5/3/2025    REPLENS VAGINAL MOISTURIZER [facility] Place vaginally.   Taking    rosuvastatin 20 MG Oral Tab Take 1 tablet (20 mg total) by mouth nightly.   2025    Calcium 200 MG Oral Tab Take 600 Units by mouth in the morning.   2025    omega-3 fatty acids 1000 MG Oral Cap Take 1,000 mg by mouth in the morning.   2025    Cholecalciferol (VITAMIN D3) 1000 UNITS Oral Cap Take 1 tablet by mouth in the morning.   2025    MULTI-VITAMIN OR Take by mouth.   2025   [3]   Past Surgical History:  Procedure Laterality Date    Biopsy of breast, incisional Left 2015    Atypical Ductal Hyperplasia      ,1999    x 2    Laparoscopy,diagnostic   and     Lumpectomy left  2021    IDC;DCIS    Kasey biopsy stereo nodule 2 site bilat (cpt=19081/50044)  2015     atypia    Kasey localization wire 1 site left (cpt=19281)  07/2015    Atypia    Needle biopsy left  08/2021    MRI bx- DCIS    Needle biopsy right  06/2016    BENIGN   MRI BX    Other accessory Left 06/24/2023    rotator cuff repair    Radiation left  2021   [4]   Social History  Socioeconomic History    Marital status:     Number of children: 2   Occupational History    Occupation: speech language pathologist   Tobacco Use    Smoking status: Never    Smokeless tobacco: Never   Vaping Use    Vaping status: Never Used   Substance and Sexual Activity    Alcohol use: No     Alcohol/week: 0.0 standard drinks of alcohol    Drug use: No    Sexual activity: Not Currently     Partners: Male   Other Topics Concern    Caffeine Concern No    Exercise No    Seat Belt Yes

## 2025-05-13 NOTE — OPERATIVE REPORT
Operative Note Hysteroscopy D&C    Patient Name:  Dalila Marie  YOB: 1960  MRN:   [id number]  Admission Date: 2025    Date of Surgery: 2025    Preop Diagnosis: Postmenopausal bleeding [N95.0]    Postop Diagnosis: Postmenopausal bleeding [N95.0]    Procedure: Procedure(s) (LRB):  TRUCLEAR HYSTEROSCOPY, dilation, hysteroscopic endometrial sampling and curettage (N/A)    Attending Physician: Poli Guajardo MD    Anesthesia: MAC    EBL: 10 mL    Fluid Deficit: 140 mL     Findings: Normal appearing external genitalia, vagina, atrophic cervix flush with posterior vaginal wall. Visualized endometrial polyp s/p removal with hysteroscopic polypectomy. Intact endometrial cavity at the end of the procedure.      Complications: None     Specimens: Endometrial biopsy, endometrial polyp    Disposition: Stable to post op recovery    Indications for the Procedure:   Dalila Marie is a 64 year old  with history of postmenopausal bleeding while on tamoxifen therapy. Endometrial biopsy was attempted in the office but unsuccessful given stenotic cervix and patient discomfort with dilation.  After discussion of risks and benefits, the decision was made to proceed with hysteroscopy, dilation and curettage along with hysteroscopic polypectomy. The patient was informed of risks and benefits of hysteroscopy, dilation and curettage. Risks include but not limited to bleeding, infection, injury to the vulva, vagina, or cervix, and uterine perforation. The patient understood the risks involved, all questions were answered, and the patient consented to the procedure.    Description of the Procedure:   The patient was taken to the operating room where a timeout was performed to confirm correct patient and correct procedure. Sedation was established. The patient was then positioned on the operating table in dorsal lithotomy position with legs supported using stirrups. All pressure points were  padded and a isabell hugger was applied to maintain control core body temperature. Patient was then prepped and draped in usual sterile fashion.  Another timeout was performed again to confirm correct patient and correct procedure.    Operative Technique:   A bivalve speculum was inserted into the vagina. The anterior lip of cervix visualized and grasped using a single-tooth tenaculum. The cervix was adequately dilated using the Wooten cervical dilators for the introduction of the hysteroscope. Endometrial pipelle was used to obtain endometrial biopsy with scant tissue/mucus obtained with two passes. After the [employer] hysteroscope system was calibrated, the hysteroscope was then gently advanced into the endometrial cavity, introduced under direct visualization using normal saline as a distending media. The hysteroscope was advanced to the fundus and the entire uterine cavity was inspected with the findings as noted above. The TRUCLEAR hysteroscopic rotary blade was then advanced through the hysteroscope under direct visualization. The window lock was set to minimize fluid use and maintain uterine distention. The rotary blade was placed against the polypoid tissue and the system was activated by stepping on one pedal. The TRUCLEAR hysteroscopic rotary blade simultaneously aspirated and cut the polypoid tissue without any complications. This specimen was collected and sent as separate sample. There was atrophic tissue visualized. The TRUCLEAR hysteroscopic rotary blade and hysteroscope were then removed from the uterus.The single-tooth tenaculum was removed from the cervix and good hemostasis was noted at the tenaculum puncture sites after application of silver nitrite. The speculum was removed from the vagina.    At the end of the procedure all needle, sponge, instrument counts were noted be correct at the ed of the case.  The patient tolerated procedure well and was transported to the recovery room in stable  condition.      Poli Guajardo MD  5/13/2025  1:18 PM

## 2025-06-02 ENCOUNTER — OFFICE VISIT (OUTPATIENT)
Dept: OBGYN CLINIC | Facility: CLINIC | Age: 65
End: 2025-06-02
Payer: COMMERCIAL

## 2025-06-02 VITALS
HEART RATE: 64 BPM | SYSTOLIC BLOOD PRESSURE: 114 MMHG | BODY MASS INDEX: 24.54 KG/M2 | WEIGHT: 156.38 LBS | DIASTOLIC BLOOD PRESSURE: 72 MMHG | HEIGHT: 67 IN

## 2025-06-02 DIAGNOSIS — Z09 POSTOPERATIVE EXAMINATION: Primary | ICD-10-CM

## 2025-06-02 PROCEDURE — 3078F DIAST BP <80 MM HG: CPT | Performed by: OBSTETRICS & GYNECOLOGY

## 2025-06-02 PROCEDURE — 3074F SYST BP LT 130 MM HG: CPT | Performed by: OBSTETRICS & GYNECOLOGY

## 2025-06-02 PROCEDURE — 3008F BODY MASS INDEX DOCD: CPT | Performed by: OBSTETRICS & GYNECOLOGY

## 2025-06-02 PROCEDURE — 99024 POSTOP FOLLOW-UP VISIT: CPT | Performed by: OBSTETRICS & GYNECOLOGY

## 2025-06-02 NOTE — PATIENT INSTRUCTIONS
VISIT SUMMARY:    You had a follow-up visit after your uterine polyp removal. We discussed your recent bleeding episode and concerns about potential bleeding during your upcoming trip to [place]. We also reviewed your ongoing tamoxifen therapy and its side effects.    YOUR PLAN:    POSTMENOPAUSAL BLEEDING: You experienced significant bleeding after your uterine polyp removal, which resolved after three days. This bleeding is likely due to your tamoxifen therapy.  -Report any new episodes of bleeding.  -Follow-up in six months or during your annual exam in November, which will include a repeat Pap smear.    TAMOXIFEN THERAPY: You are continuing tamoxifen therapy for breast cancer management, which has caused side effects like vaginal dryness and potential uterine polyp formation.  -Continue tamoxifen therapy as prescribed.

## 2025-06-02 NOTE — PROGRESS NOTES
[facility]  Obstetrics and Gynecology  Postoperative Appointment    The following individual(s) verbally consented to be recorded using ambient AI listening technology and understand that they can each withdraw their consent to this listening technology at any point by asking the clinician to turn off or pause the recording:    Name: Maddie Marie    CC: Postoperative appointment    Subjective:     HPI: Dalila Marie is a 64 year old  female who is POD# 20 from [facility] Hysteroscopy, dilation, hysteroscopic endometrial sampling and curettage on .    History of Present Illness  Maddie Marie is a 64 year old female who presents for follow-up after uterine polyp removal.    About a week after the uterine polyp removal procedure, she experienced significant bleeding that persisted for three days before resolving. She is concerned about the possibility of experiencing similar bleeding during her upcoming fifteen-day trip to [place].    She is currently on tamoxifen therapy, with approximately a year and a half remaining. Tamoxifen has led to issues such as vaginal dryness and pelvic wall problems, for which she has undergone therapy.    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 She believes she has a robust immune system due to her exposure to the school environment.      OB History:  OB History    Para Term  AB Living   2 2 2 0 0 2   SAB IAB Ectopic Multiple Live Births   0 0 0 0 2   Obstetric Comments   Menarche: 16 y/o   Menopause: 54 y/o   OCP use x 2 yrs   Fertility treatment for 4 yrs   No HRT use   Breastfeed: YES   BRA SIZE: 36B/C       Gyne History:     No LMP recorded (lmp unknown). Patient is postmenopausal.    Pap smear history:  2013 - NILM   2014 - NILM   2015 - NILM   2016 - NILM   2017 - NILM   2018 - NILM   2019 - NILM   2020 - NILM   2021 - NILM    11/2022 - NILM  11/2023 - NILM   11/21/2024 - ASCUS HPV neg   12/27/2024 - colposcopy: ECC and 4:00 site with LSIL and chronic endocervicitis   - Recommend f/u in 1 year with pap smear and HPV testing      Denies history of STDs (non-HPV).   Currently sexually active   Birth control: postmenopausal      Meds:  Medications Ordered Prior to Encounter[1]    All:  Allergies[2]    PMH:  Past Medical History[3]    Immunization History:   Immunization History   Administered Date(s) Administered    >=3 YRS TRI  MULTIDOSE VIAL (85952) FLU CLINIC 10/16/2021    Covid-19 Vaccine Moderna 100 mcg/0.5 ml 01/21/2021, 02/18/2021    Covid-19 Vaccine Pfizer 30 mcg/0.3 ml 12/04/2021    Covid-19 Vaccine Pfizer Bivalent 30mcg/0.3mL 11/17/2022    Covid-19 Vaccine Pfizer Azael-Sucrose 30 mcg/0.3 ml 07/12/2022    FLU VAC QIV SPLIT 3 YRS AND OLDER (57091) 10/19/2016, 10/07/2017, 10/17/2018, 10/31/2019    FLUZONE 6 months and older PFS 0.5 ml (75371) 11/04/2018    Flucelvax 0.5 Ml Quad PFS Single Dose 10/18/2020    Fluvirin, 3 Years & >, Im 11/27/2013, 10/28/2014, 10/14/2015    Influenza 11/01/2012, 11/27/2013, 10/13/2015, 10/19/2016, 10/07/2017    Influenza Vaccine, trivalent (IIV3), PF 0.5mL (31472) 10/20/2024    Influenza(Afluria)0.5ml QIV PFS 10/28/2019    Pfizer Covid-19 Vaccine 30mcg/0.3ml 12yrs+ 11/03/2023, 10/20/2024    TDAP 07/15/2013       PSH:  Past Surgical History[4]    Social History:  Social History     Socioeconomic History    Marital status:      Spouse name: Not on file    Number of children: 2    Years of education: Not on file    Highest education level: Not on file   Occupational History    Occupation: speech language pathologist   Tobacco Use    Smoking status: Never    Smokeless tobacco: Never   Vaping Use    Vaping status: Never Used   Substance and Sexual Activity    Alcohol use: No     Alcohol/week: 0.0 standard drinks of alcohol    Drug use: No    Sexual activity: Not Currently     Partners: Male   Other  Topics Concern    Caffeine Concern No    Exercise No    Seat Belt Yes    Special Diet Not Asked    Stress Concern Not Asked    Weight Concern Not Asked     Service Not Asked    Blood Transfusions Not Asked    Occupational Exposure Not Asked    Hobby Hazards Not Asked    Sleep Concern Not Asked    Back Care Not Asked    Bike Helmet Not Asked    Self-Exams Not Asked   Social History Narrative    Single, lives alone    Working full time     Has 1 son in the city, 1 dtr in [place]     Social Drivers of Health     Food Insecurity: Not on file   Transportation Needs: Not on file   Stress: Not on file   Housing Stability: Not on file         Patient feels unsafe or threatened?: NO    Abuse: None (physical, sexual or mental)    Family History:  Family History[5]    Review of Systems:  General: no complaints per category. See HPI for additional information.   Breast: no complaints per category. See HPI for additional information.   Respiratory: no complaints per category. See HPI for additional information.   Cardiovascular: no complaints per category. See HPI for additional information.   GI: no complaints per category. See HPI for additional information.   : no complaints per category. See HPI for additional information.   Heme: no complaints per category. See HPI for additional information.       Objective:     Vitals:    06/02/25 1131   BP: 114/72   Pulse: 64   Weight: 156 lb 6 oz (70.9 kg)   Height: 67\"         Body mass index is 24.49 kg/m².    General: AAO.NAD.   CV: regular rate and rhythm, +S1, +S2, no murmurs/rubs/gallops  Pulm: clear to auscultation bilaterally   Chest: non-labored breathing, no tachypnea   Breast: deferred  Abdominal exam: soft, nontender, nondistended  Pelvic exam: deferred   Ext: non-tender, no edema, normal peripheral perfusion    Case Report   Surgical Pathology                                Case: [id number]                                   Authorizing Provider:  Poli Guajardo MD           Collected:           2025 12:56 PM           Ordering Location:     [facility]    Received:            2025 03:19 PM           Pathologist:           Tracy Lyons MD                                                             Specimens:   A) - Endometrium, endometrial biopsy                                                                B) - Endometrial polyp                                                                    Final Diagnosis:   A.  Endometrium, biopsy:  - Fragments of benign inactive endometrium.     B.  Endometrium, polyp:  - Fragments of benign endometrial polyp.   Electronically signed by Tracy Lyons MD on 2025 at 0959 CDT       Assessment:     Dalila Marie is a 64 year old  female who is POD# 20 from [facility] Hysteroscopy, dilation, hysteroscopic endometrial sampling and curettage on .    Plan:     Problem List Items Addressed This Visit    None  Visit Diagnoses         Postoperative examination    -  Primary            Assessment & Plan  Postmenopausal bleeding  Postmenopausal bleeding secondary to tamoxifen therapy. Recent removal of a benign uterine polyp resolved the bleeding, but recurrence is possible due to ongoing tamoxifen therapy. She is aware of the need for another hysteroscopy if polyps recur.  - Report any new episodes of bleeding.  - Follow-up in six months or during annual exam in November, including repeat Pap smear.    Tamoxifen therapy  Continuation of tamoxifen therapy for breast cancer management with 1.5 years remaining. Tamoxifen is associated with side effects including vaginal dryness and potential uterine polyp formation. She has experienced pelvic wall issues requiring therapy, attributed to tamoxifen.  - Continue tamoxifen therapy as prescribed.    Postoperative Appointment  - Patient has met post-surgical milestones as expected.   - Intraoperative images reviewed with patient   - doing well, no complaints   - no  abnormal findings on physical exam   - Completed postoperative restrictions including nothing per vagina and no heavy lifting     All of the findings and plan were discussed with the patient.  She notes understanding and agrees with the plan of care.  All questions were answered to the best of my ability at this time.    RTC 6 months for annual exam (with pap and HPV testing)     Poli Guajardo MD   [facility] - OBGYN      Note to patient and family   The 21st Century Cures Act makes medical notes available to patients in the interest of transparency.  However, please be advised that this is a medical document.  It is intended as [id number] communication.  It is written and medical language may contain abbreviations or verbiage that are technical and unfamiliar.  It may appear blunt or direct.  Medical documents are intended to carry relevant information, facts as evident, and the clinical opinion of the practitioner.           This note could include assistance by Dragon voice recognition. Errors in content may be related to improper recognition by the system; efforts to review and correct have been done but errors may still exist.           [1]   Current Outpatient Medications on File Prior to Visit   Medication Sig Dispense Refill    famotidine 20 MG Oral Tab Take 1 tablet (20 mg total) by mouth 2 (two) times daily.      TAMOXIFEN 20 MG Oral Tab TAKE 1 TABLET BY MOUTH EVERY DAY 90 tablet 1    metFORMIN  MG Oral Tablet 24 Hr Take 2 tablets (1,000 mg total) by mouth daily. 180 tablet 1    REPLENS VAGINAL MOISTURIZER [facility] Place vaginally.      rosuvastatin 20 MG Oral Tab Take 1 tablet (20 mg total) by mouth nightly.      Calcium 200 MG Oral Tab Take 600 Units by mouth in the morning.      omega-3 fatty acids 1000 MG Oral Cap Take 1,000 mg by mouth in the morning.      Cholecalciferol (VITAMIN D3) 1000 UNITS Oral Cap Take 1 tablet by mouth in the morning.      MULTI-VITAMIN OR Take by mouth.       No current  facility-administered medications on file prior to visit.   [2]   Allergies  Allergen Reactions    Trimethoprim RASH   [3]   Past Medical History:   Amenorrhea    Anemia    as a child    Anesthesia complication    too much epidural with 1st  was paralyzed from jaw down    Atypical ductal hyperplasia of left breast    Breast cancer (HCC)    left breast ca    Cancer (HCC)    breast (L)    Ductal carcinoma in situ of breast    left breast    Esophageal reflux    Exposure to medical diagnostic radiation    Last treatment 2021    H/O mammogram    negative     HEADACHES    High cholesterol    History of shingles    HYPERLIPIDEMIA    Infertility, female    Pap smear for cervical cancer screening    WNL -- for years , , , , , , , , , , , ,     Visual impairment    Glasses reading   [4]   Past Surgical History:  Procedure Laterality Date    Biopsy of breast, incisional Left 2015    Atypical Ductal Hyperplasia      ,1999    x 2    Laparoscopy,diagnostic   and     Lumpectomy left  2021    IDC;DCIS    Kasey biopsy stereo nodule 2 site bilat (cpt=19081/02418)  2015    atypia    Kasey localization wire 1 site left (cpt=19281)  2015    Atypia    Needle biopsy left  2021    MRI bx- DCIS    Needle biopsy right  2016    BENIGN   MRI BX    Other accessory Left 2023    rotator cuff repair    Radiation left     [5]   Family History  Problem Relation Age of Onset    Heart Disorder Father     Hypertension Father     Lipids Mother     Lung Disorder Mother         COPD-previou smoker    Thyroid disease Mother     Cancer Sister         breast    Breast Cancer Sister 40        two times @49 y/o    Heart Disorder Brother     Hypertension Brother     Seizure Disorder Brother     Breast Cancer Self 61    DCIS Self 61    Breast Cancer Niece 39

## 2025-07-16 ENCOUNTER — OFFICE VISIT (OUTPATIENT)
Facility: CLINIC | Age: 65
End: 2025-07-16
Payer: COMMERCIAL

## 2025-07-16 VITALS
BODY MASS INDEX: 25.55 KG/M2 | RESPIRATION RATE: 18 BRPM | HEIGHT: 66 IN | SYSTOLIC BLOOD PRESSURE: 114 MMHG | WEIGHT: 159 LBS | HEART RATE: 78 BPM | OXYGEN SATURATION: 98 % | DIASTOLIC BLOOD PRESSURE: 60 MMHG

## 2025-07-16 VITALS
HEART RATE: 71 BPM | OXYGEN SATURATION: 97 % | BODY MASS INDEX: 24.91 KG/M2 | HEIGHT: 66 IN | SYSTOLIC BLOOD PRESSURE: 102 MMHG | DIASTOLIC BLOOD PRESSURE: 53 MMHG | WEIGHT: 155 LBS

## 2025-07-16 DIAGNOSIS — E66.3 OVERWEIGHT (BMI 25.0-29.9): ICD-10-CM

## 2025-07-16 DIAGNOSIS — Z17.0 CARCINOMA OF LOWER-OUTER QUADRANT OF LEFT BREAST IN FEMALE, ESTROGEN RECEPTOR POSITIVE (HCC): Primary | ICD-10-CM

## 2025-07-16 DIAGNOSIS — C50.512 CARCINOMA OF LOWER-OUTER QUADRANT OF LEFT BREAST IN FEMALE, ESTROGEN RECEPTOR POSITIVE (HCC): Primary | ICD-10-CM

## 2025-07-16 DIAGNOSIS — Z12.31 ENCOUNTER FOR SCREENING MAMMOGRAM FOR BREAST CANCER: ICD-10-CM

## 2025-07-16 DIAGNOSIS — Z51.81 ENCOUNTER FOR THERAPEUTIC DRUG LEVEL MONITORING: Primary | ICD-10-CM

## 2025-07-16 DIAGNOSIS — E78.5 HYPERLIPIDEMIA, UNSPECIFIED HYPERLIPIDEMIA TYPE: ICD-10-CM

## 2025-07-16 DIAGNOSIS — R73.09 ELEVATED GLUCOSE: ICD-10-CM

## 2025-07-16 PROCEDURE — 99213 OFFICE O/P EST LOW 20 MIN: CPT | Performed by: SURGERY

## 2025-07-16 PROCEDURE — 3074F SYST BP LT 130 MM HG: CPT | Performed by: SURGERY

## 2025-07-16 PROCEDURE — 3078F DIAST BP <80 MM HG: CPT | Performed by: PHYSICIAN ASSISTANT

## 2025-07-16 PROCEDURE — 3074F SYST BP LT 130 MM HG: CPT | Performed by: PHYSICIAN ASSISTANT

## 2025-07-16 PROCEDURE — 3008F BODY MASS INDEX DOCD: CPT | Performed by: PHYSICIAN ASSISTANT

## 2025-07-16 PROCEDURE — 99213 OFFICE O/P EST LOW 20 MIN: CPT | Performed by: PHYSICIAN ASSISTANT

## 2025-07-16 PROCEDURE — 3008F BODY MASS INDEX DOCD: CPT | Performed by: SURGERY

## 2025-07-16 PROCEDURE — 3078F DIAST BP <80 MM HG: CPT | Performed by: SURGERY

## 2025-07-16 RX ORDER — TURMERIC ROOT EXTRACT 500 MG
1 TABLET ORAL DAILY
COMMUNITY

## 2025-07-16 RX ORDER — VIT C/B6/B5/MAGNESIUM/HERB 173 50-5-6-5MG
CAPSULE ORAL
COMMUNITY
Start: 2025-07-08

## 2025-07-16 NOTE — PROGRESS NOTES
HISTORY OF PRESENT ILLNESS  Chief Complaint   Patient presents with    Weight Check     +2lbs( wc fu 1/13/25 157lbs)       Dalila Marie is a 64 year old female here for follow up in medical weight loss program.   +2lbs  Stopped metformin about a week ago  Denies chest pain, shortness of breath, dizziness, blurred vision, headache, paresthesia, nausea/vomiting.   Has taken herself off everything for a week, has not been feeling great and doesn't know why  Was diagnosed with OA with her L knee, and now is getting nodules in her fingers so is following up to r/o RA  Just feels like body is out of whack  Has been eating a lot of breads and desserts in the past 2 weeks, and has gained 6lbs  With being off of everything has felt better    Exercise/Activity: in PT for her knee, is doing [employer]  Nutrition: 24 hour food log reviewed, eating regular meals, +protein  Stress: 5/10  Sleep: 4 hours/night, uninterrupted    WLC Follow Up    General Information  Success Moment: maintaining acceptable weight  Challenging Moment: recent dx of osteoarthritis  Nutrition Recall  Exercise   Patient stated exercises # days/week: 4  Patient stated perceived level of   exertion: 3 Anaerobic Days: 2   Aerobic Days: 2   Patient stated average level of stress: 5  Sleep   Patient stated # hours uninterrupted sleep: 4   Patient stated feels   restful: No      Cause of disruption of sleep: bathroom; knee pain   Goals: possibly  losing 5 lbs              Wt Readings from Last 6 Encounters:   07/16/25 159 lb (72.1 kg)   07/16/25 155 lb (70.3 kg)   06/02/25 156 lb 6 oz (70.9 kg)   05/13/25 158 lb 13.5 oz (72 kg)   04/28/25 156 lb 2 oz (70.8 kg)   02/04/25 158 lb 3.2 oz (71.8 kg)            Breakfast Lunch Dinner Snacks Fluids   Reviewed           REVIEW OF SYSTEMS  GENERAL HEALTH: feels well otherwise, denied any fevers chills or night sweats   RESPIRATORY: denies shortness of breath   CARDIOVASCULAR: denies chest pain  GI: denies abdominal  pain    EXAM  /60   Pulse 78   Resp 18   Ht 5' 6\" (1.676 m)   Wt 159 lb (72.1 kg)   LMP  (LMP Unknown)   SpO2 98%   BMI 25.66 kg/m²   GENERAL: well developed, well nourished,in no apparent distress, A/O x3  SKIN: no rashes,no suspicious lesions  HEENT: atraumatic, normocephalic, OP-clear, PERRL  NECK: supple,no adenopathy  LUNGS: clear to auscultation bilaterally   CARDIO: RRR without murmur  GI: good BS's,NT/ND, no masses or HSM  EXTREMITIES: no cyanosis, no clubbing, no edema    Lab Results   Component Value Date    WBC 4.95 06/14/2021    RBC 4.58 06/14/2021    HGB 13.4 06/14/2021    HCT 42.0 06/14/2021    MCV 91.7 06/14/2021    MCH 29.3 06/14/2021    MCHC 31.9 06/14/2021    RDW 12.7 06/14/2021     06/14/2021     Lab Results   Component Value Date     (H) 02/02/2025    BUN 19 02/02/2025    BUNCREA 18.2 02/03/2024    CREATSERUM 0.82 02/02/2025    ANIONGAP 11 02/02/2025     06/27/2015    GFRNAA 75 02/10/2022    GFRAA 87 02/10/2022    CA 9.2 02/02/2025    OSMOCALC 298 (H) 02/02/2025    ALKPHO 40 (L) 08/09/2022    AST 16 08/09/2022    ALT 27 08/09/2022    BILT 0.3 08/09/2022    TP 7.3 08/09/2022    ALB 3.5 08/09/2022    GLOBULIN 3.8 08/09/2022    AGRATIO 0.9 (L) 06/13/2013     02/02/2025    K 4.1 02/02/2025     02/02/2025    CO2 24.0 02/02/2025     Lab Results   Component Value Date     06/14/2021    A1C 5.6 06/14/2021     Lab Results   Component Value Date    CHOLEST 169.00 06/14/2021    TRIG 76.00 06/14/2021    HDL 62.9 06/14/2021    LDL 91 06/14/2021    VLDL 15 06/14/2021     Lab Results   Component Value Date    T4F 0.96 12/31/2014    TSH 1.290 12/22/2018     No results found for: \"B12\", \"VITB12\"  Lab Results   Component Value Date    VITD 37.1 09/14/2021       Medications Ordered Prior to Encounter[1]    ASSESSMENT  Analyzed weight data:       Diagnoses and all orders for this visit:    Encounter for therapeutic drug level monitoring    Overweight (BMI  25.0-29.9)    Hyperlipidemia, unspecified hyperlipidemia type    Elevated glucose        PLAN  Initial Weight: 162lbs  Initial Weight Date: 4/23/24  Today's Weight: 159lbs  5% Goal: 8.1lbs  10% Goal: 16.2lbs  Total Weight Loss: +2lbs/Net loss 3lbs    Pt can restart metformin - discussed MOA, advised side effects and adverse effects of medication  HLD - stable on current medication rosuvastatin, will continue, will continue to work on lifestyle modifications  Elevated glucose - continue metformin, low carb diet  Consistency with logging foods - protein and produce  Incorporate strength/resistance training  Nutrition: low carb diet/ recommended to eat breakfast daily/ regular protein intake  Medication use and side effects reviewed with patient.  Medication contraindications:  caution GLP-1   Follow up with dietitian and psychologist as recommended.  Discussed the role of sleep and stress in weight management.  Counseled on comprehensive weight loss plan including attention to nutrition, exercise and behavior/stress management for success. See patient instruction below for more details.  Discussed strategies to overcome barriers to successful weight loss and weight maintenance  FITTE: ACSM recommendations (150-300 minutes/ week in active weight loss)   Weight Loss consent to treat reviewed and signed       NOTE TO PATIENT: The 21st Century Cures Act makes clinical notes like these available to patients in the interest of transparency. Clinical notes are medical documents used by physicians and care providers to communicate with each other. These documents include medical language and terminology, abbreviations, and treatment information that may sound technical and at times possibly unfamiliar. In addition, at times, the verbiage may appear blunt or direct. These documents are one tool providers use to communicate relevant information and clinical opinions of the care providers in a way that allows common  understanding of the clinical context.     There are no Patient Instructions on file for this visit.    No follow-ups on file.    Patient verbalizes understanding.    Kaitlynn Maynard PA-C  7/16/2025           [1]   Current Outpatient Medications on File Prior to Visit   Medication Sig Dispense Refill    Boswellia Abi (BOSWELLIA OR)       Turmeric (CURCUMIN 95) 500 MG Oral Cap       famotidine 20 MG Oral Tab Take 1 tablet (20 mg total) by mouth 2 (two) times daily.      TAMOXIFEN 20 MG Oral Tab TAKE 1 TABLET BY MOUTH EVERY DAY 90 tablet 1    metFORMIN  MG Oral Tablet 24 Hr Take 2 tablets (1,000 mg total) by mouth daily. 180 tablet 1    REPLENS VAGINAL MOISTURIZER VA Place vaginally.      rosuvastatin 20 MG Oral Tab Take 1 tablet (20 mg total) by mouth nightly.      Calcium 200 MG Oral Tab Take 600 Units by mouth in the morning.      omega-3 fatty acids 1000 MG Oral Cap Take 1,000 mg by mouth in the morning.      Cholecalciferol (VITAMIN D3) 1000 UNITS Oral Cap Take 1 tablet by mouth in the morning.      MULTI-VITAMIN OR Take by mouth.       No current facility-administered medications on file prior to visit.

## 2025-07-21 ENCOUNTER — HOSPITAL ENCOUNTER (OUTPATIENT)
Dept: BONE DENSITY | Age: 65
Discharge: HOME | End: 2025-07-21
Attending: SPECIALIST
Payer: COMMERCIAL

## 2025-07-21 DIAGNOSIS — C50.912 INVASIVE DUCTAL CARCINOMA OF BREAST, FEMALE, LEFT (HCC): ICD-10-CM

## 2025-07-21 DIAGNOSIS — Z78.0 OSTEOPENIA AFTER MENOPAUSE: ICD-10-CM

## 2025-07-21 DIAGNOSIS — M85.80 OSTEOPENIA AFTER MENOPAUSE: ICD-10-CM

## 2025-07-21 PROCEDURE — 77080 DXA BONE DENSITY AXIAL: CPT | Performed by: SPECIALIST

## 2025-08-01 ENCOUNTER — LAB ENCOUNTER (OUTPATIENT)
Dept: LAB | Age: 65
End: 2025-08-01
Attending: SPECIALIST

## 2025-08-01 DIAGNOSIS — M85.80 OSTEOPENIA AFTER MENOPAUSE: ICD-10-CM

## 2025-08-01 DIAGNOSIS — Z78.0 OSTEOPENIA AFTER MENOPAUSE: ICD-10-CM

## 2025-08-01 DIAGNOSIS — C50.912 INVASIVE DUCTAL CARCINOMA OF BREAST, FEMALE, LEFT (HCC): ICD-10-CM

## 2025-08-01 LAB
ANION GAP SERPL CALC-SCNC: 9 MMOL/L (ref 0–18)
BUN BLD-MCNC: 14 MG/DL (ref 9–23)
CALCIUM BLD-MCNC: 9 MG/DL (ref 8.7–10.6)
CHLORIDE SERPL-SCNC: 109 MMOL/L (ref 98–112)
CO2 SERPL-SCNC: 26 MMOL/L (ref 21–32)
CREAT BLD-MCNC: 0.76 MG/DL (ref 0.55–1.02)
EGFRCR SERPLBLD CKD-EPI 2021: 89 ML/MIN/1.73M2 (ref 60–?)
FASTING STATUS PATIENT QL REPORTED: NO
GLUCOSE BLD-MCNC: 64 MG/DL (ref 70–99)
OSMOLALITY SERPL CALC.SUM OF ELEC: 297 MOSM/KG (ref 275–295)
POTASSIUM SERPL-SCNC: 4 MMOL/L (ref 3.5–5.1)
SODIUM SERPL-SCNC: 144 MMOL/L (ref 136–145)

## 2025-08-01 PROCEDURE — 80048 BASIC METABOLIC PNL TOTAL CA: CPT

## 2025-08-01 PROCEDURE — 36415 COLL VENOUS BLD VENIPUNCTURE: CPT

## 2025-08-05 ENCOUNTER — OFFICE VISIT (OUTPATIENT)
Facility: LOCATION | Age: 65
End: 2025-08-05
Attending: GENETIC COUNSELOR, MS

## 2025-08-05 ENCOUNTER — APPOINTMENT (OUTPATIENT)
Facility: LOCATION | Age: 65
End: 2025-08-05
Attending: GENETIC COUNSELOR, MS

## 2025-08-05 ENCOUNTER — OFFICE VISIT (OUTPATIENT)
Dept: UROLOGY | Facility: CLINIC | Age: 65
End: 2025-08-05
Attending: OBSTETRICS & GYNECOLOGY

## 2025-08-05 VITALS
OXYGEN SATURATION: 100 % | RESPIRATION RATE: 18 BRPM | SYSTOLIC BLOOD PRESSURE: 103 MMHG | BODY MASS INDEX: 25.52 KG/M2 | HEART RATE: 89 BPM | WEIGHT: 158.81 LBS | HEIGHT: 65.98 IN | DIASTOLIC BLOOD PRESSURE: 57 MMHG | TEMPERATURE: 98 F

## 2025-08-05 VITALS — TEMPERATURE: 98 F | WEIGHT: 157.38 LBS | HEIGHT: 66 IN | RESPIRATION RATE: 16 BRPM | BODY MASS INDEX: 25.29 KG/M2

## 2025-08-05 DIAGNOSIS — M62.89 HIGH-TONE PELVIC FLOOR DYSFUNCTION: Primary | ICD-10-CM

## 2025-08-05 DIAGNOSIS — Z51.81 ENCOUNTER FOR MONITORING TAMOXIFEN THERAPY: ICD-10-CM

## 2025-08-05 DIAGNOSIS — M85.80 OSTEOPENIA AFTER MENOPAUSE: ICD-10-CM

## 2025-08-05 DIAGNOSIS — Z08 ENCOUNTER FOR FOLLOW-UP SURVEILLANCE OF BREAST CANCER: ICD-10-CM

## 2025-08-05 DIAGNOSIS — Z79.810 ENCOUNTER FOR MONITORING TAMOXIFEN THERAPY: ICD-10-CM

## 2025-08-05 DIAGNOSIS — C50.912 INVASIVE DUCTAL CARCINOMA OF BREAST, FEMALE, LEFT (HCC): Primary | ICD-10-CM

## 2025-08-05 DIAGNOSIS — N81.84 PELVIC MUSCLE WASTING: ICD-10-CM

## 2025-08-05 DIAGNOSIS — Z79.810 LONG-TERM CURRENT USE OF TAMOXIFEN: ICD-10-CM

## 2025-08-05 DIAGNOSIS — Z78.0 OSTEOPENIA AFTER MENOPAUSE: ICD-10-CM

## 2025-08-05 DIAGNOSIS — N95.2 POSTMENOPAUSAL ATROPHIC VAGINITIS: ICD-10-CM

## 2025-08-05 DIAGNOSIS — Z85.3 ENCOUNTER FOR FOLLOW-UP SURVEILLANCE OF BREAST CANCER: ICD-10-CM

## 2025-08-05 PROCEDURE — 99212 OFFICE O/P EST SF 10 MIN: CPT

## 2025-08-05 RX ORDER — ESTRADIOL 0.1 MG/G
CREAM VAGINAL
Qty: 42 G | Refills: 3 | Status: SHIPPED | OUTPATIENT
Start: 2025-08-05

## (undated) DEVICE — STANDARD HYPODERMIC NEEDLE,POLYPROPYLENE HUB: Brand: MONOJECT

## (undated) DEVICE — CATHETER,URETHRAL,REDRUBBER,STRL,16FR: Brand: MEDLINE

## (undated) DEVICE — ENCORE® PERRY STYLE 42 PF SIZE 6.5, STERILE LATEX POWDER-FREE SURGICAL GLOVE: Brand: ENCORE

## (undated) DEVICE — SUTURE VICRYL 3-0 SH

## (undated) DEVICE — CLIP MED INTNL HMCLP TNTLM

## (undated) DEVICE — COVER PRB NEOGUARD 30X2.6CM US

## (undated) DEVICE — SOL  .9 1000ML BTL

## (undated) DEVICE — SKN PREP SPNG STKS PVP PNT STR: Brand: MEDLINE INDUSTRIES, INC.

## (undated) DEVICE — SOFT TISSUE SHAVER MINI: Brand: TRUCLEAR

## (undated) DEVICE — PACK GYNE CUSTOM

## (undated) DEVICE — SOLUTION IRRIG 1000ML 0.9% NACL USP BTL

## (undated) DEVICE — NEEDLE 18G 1-1/2 BLUNT FILL

## (undated) DEVICE — 2000CC GUARDIAN II: Brand: GUARDIAN

## (undated) DEVICE — DRAPE SHEET LG

## (undated) DEVICE — CURETTE ENDOMET SUCT POLYPR SLF CONTAINED

## (undated) DEVICE — HYSTEROSCOPIC OUTFLOW TUBE SET

## (undated) DEVICE — DRAPE TAPE: Brand: CONVERTORS

## (undated) DEVICE — Device: Brand: JELCO

## (undated) DEVICE — SPONGE: SPECIALTY PEANUT XR 100/CS: Brand: MEDICAL ACTION INDUSTRIES

## (undated) DEVICE — CLIP SM INTNL HMCLP TNTLM ESCP

## (undated) DEVICE — 12 ML SYRINGE LUER-LOCK TIP: Brand: MONOJECT

## (undated) DEVICE — GLOVE SUR 6.5 SENSICARE PI PIP GRN PWD F

## (undated) DEVICE — SUTURE MONOCRYL 4-0 PS-2

## (undated) DEVICE — 6 ML SYRINGE LUER-LOCK TIP: Brand: MONOJECT

## (undated) DEVICE — ADHESIVE MASTISOL 2/3CC VL

## (undated) DEVICE — SOLUTION IRRIG 3000ML 0.9% NACL FLX CONT

## (undated) DEVICE — BRA SURG ELIZ PINK L

## (undated) DEVICE — FLEXIBLE YANKAUER,MEDIUM TIP, NO VACUUM CONTROL: Brand: ARGYLE

## (undated) DEVICE — MINOR GENERAL: Brand: MEDLINE INDUSTRIES, INC.

## (undated) DEVICE — MEDI-VAC NON-CONDUCTIVE SUCTION TUBING: Brand: CARDINAL HEALTH

## (undated) DEVICE — ELITE HYSTEROSCOPE SEAL: Brand: TRUCLEAR

## (undated) DEVICE — SLEEVE COMPR MD KNEE LEN SGL USE KENDALL SCD

## (undated) DEVICE — SUTURE SILK 2-0 FS

## (undated) DEVICE — DRAPE PACK CHEST & U BAR

## (undated) DEVICE — SUTURE PDS II 3-0 Z683G

## (undated) DEVICE — CONTAINER SPEC STR 4OZ GRY LID

## (undated) DEVICE — ABDOMINAL PAD: Brand: CURITY

## (undated) DEVICE — SET TB INFLO FOR TRUCLEAR SYS HYSTEROLUX

## (undated) DEVICE — 3M™ STERI-STRIP™ REINFORCED ADHESIVE SKIN CLOSURES, R1547, 1/2 IN X 4 IN (12 MM X 100 MM), 6 STRIPS/ENVELOPE: Brand: 3M™ STERI-STRIP™

## (undated) DEVICE — GLOVE SUR 6 SENSICARE PI PIP CRM PWD F

## (undated) DEVICE — SPECIMEN SOCK - STANDARD: Brand: MEDI-VAC

## (undated) NOTE — LETTER
Patient Name: Byrd Paget        : 1960       Medical Record #: [id number]    CONSENT FOR PROCEDURES/SEDATION    Date: 8/3/2021       Time: 12:49 PM        1.  I authorize the performance upon Sherman Paget the following:    ___________

## (undated) NOTE — MR AVS SNAPSHOT
After Visit Summary   11/18/2019    Roya Ferguson    MRN: [id number]           Visit Information     Date & Time  11/18/2019  5:00 PM Provider  Keke Hensley MD [place] [address], [address]  Phone  [phone] If you receive a survey from [employer], please take a few minutes to complete it and provide feedback. We strive to deliver the best patient experience and are looking for ways to make improvements. Your feedback will help us do so.  For more information

## (undated) NOTE — LETTER
[facility]  Administration of Anesthesia  1. I, Curry James, or _________________________________ acting on her behalf, (Patient) (Dependent/Representative) request to receive anesthesia for my pending procedure/operation/treatment. bleeding, seizure, cardiac arrest and death. 7. AWARENESS: I understand that it is possible (but unlikely) to have explicit memory of events from the operating room while under general anesthesia.   8. ELECTROCONVULSIVE THERAPY PATIENTS: This consent serve below affirms that prior to the time of the procedure, I have explained to the patient and/or his/her guardian, the risks and benefits of undergoing anesthesia, as well as any reasonable alternatives.     ___________________________________________________

## (undated) NOTE — LETTER
OUTSIDE TESTING RESULT REQUEST     IMPORTANT: FOR YOUR IMMEDIATE ATTENTION  Please FAX all test results listed below to: [phone]     Testing already done on or about: 2025     * * * * If testing is NOT complete, arrange with patient A.S.A.P. * * * *      Patient Name: Dalila Marie  Surgery Date: 2025  Medical Record: [id number]  CSN: [id number]  : 1960 - A: 64 y     Sex: female  Surgeon(s):  Poli Guajardo MD  Procedure: TRUCLEAR HYSTEROSCOPY, dilation, hysteroscopic endometrial sampling and curettage  Anesthesia Type: MAC     Surgeon: Poli Guajardo MD     The following Testing and Time Line are REQUIRED PER ANESTHESIA     CBC, Platelet; NO Differential within  30 days      Thank You,   Sent by: KRISTIE Smith

## (undated) NOTE — LETTER
To:  Dr. Dixon  Date:  2025  Patient Name: Dalila Marie  -Age / Sex: 1960-A: 64 y  female  Medical Records: [id number]   CSN: [id number]      Clearance for Surgery Requested by Surgeon    Request for:  Medical Clearance    Requested by Surgeon: Dr. Poli Guajardo    Surgical Date: 2025    Procedure: TRUCLEAR HYSTEROSCOPY, dilation, hysteroscopic endometrial sampling and curettage, N/A      Please fax the clearance note to the Pre-Admission Testing department.  Thank you.

## (undated) NOTE — Clinical Note
Mabel - I saw Maddie today with pelvic floor sx. I've recommended vag moisturizers and pelvic floor PT. I will work to manage her sx. I appreciate the opportunity to participate in her care. Thanks, Christel

## (undated) NOTE — LETTER
[address], [place]      Authorization for Surgical Operation and Procedure     Date:___________                                                                                                         Time:_______ effects as a result of receiving a blood transfusion and/or blood products.   The following are some, but not all, of the potential risks that can occur: fever and allergic reactions, hemolytic reactions, transmission of diseases such as Hepatitis, AIDS and otherwise explicitly stated by me (or a person authorized to consent on my behalf). The surgeon or my attending physician will determine when the applicable recovery period ends for purposes of reinstating the DNAR order.   10. Patients having a sterilizati relationship used in this procedure/surgery, I have disclosed this and had a discussion with my patient.     _______________________________________________________________ _____________________________  Greyson Stairs of Physician)

## (undated) NOTE — LETTER
To:  Dr. Reed Dixon  Date:  2025  Patient Name: Dalila Marie  -Age / Sex: 1960-A: 64 y  female  Medical Records: [id number]   CSN: [id number]      Clearance for Surgery Requested by Surgeon    Request for:  Medical Clearance    Requested by Surgeon: Dr. Conor Guajardo    Surgical Date: 2025    Procedure: TRUCLEAR HYSTEROSCOPY, dilation, hysteroscopic endometrial sampling and curettage, N/A      Please fax the clearance note to the Pre-Admission Testing department.  Thank you.

## (undated) NOTE — LETTER
Dalila Marie, :1960    CONSENT FOR PROCEDURE/SEDATION    1. I authorize the performance upon Dalila Marie  the following: Colposcopy with biopsy and Endocervical curettage    2. I authorize Dr. Poli Guajardo MD (and whomever is designated as the doctor’s assistant), to perform the above-mentioned procedures.    3. If any unforeseen conditions arise during this procedure calling for additional  procedures, operations, or medications (including anesthesia and blood transfusion), I further request and authorize the doctor to do whatever he/she deems advisable in my interest.    4. I consent to the taking and reproduction of any photographs in the course of this procedure for professional purposes.    5. I consent to the administration of such sedation as may be considered necessary or advisable by the physician responsible for this service, with the exception of ______________________________________________________    6. I have been informed by my doctor of the nature and purpose of this procedure sedation, possible alternative methods of treatment, risk involved and possible complications.    7. If I have a Do Not Resuscitate (DNR) order in place, the physician and I (or the individual authorized to consent on my behalf) will discuss and agree as to whether the Do Not Resuscitate (DNR) order will remain in effect or will be discontinued during the performance of the procedure and the applicable recovery period. If the Do Not Resuscitate (DNR) order is discontinued and is to be reinstated following the procedure/recovery period, the physician will determine when the applicable recovery period ends for purposes of reinstating the Do Not Resuscitate (DNR) order.    Signature of Patient:_______________________________________________    Signature of person authorized to consent for patient:  _______________________________________________________________    Relationship to patient:  ____________________________________________    Witness: _________________________________________ Date:___________     Physician Signature: _______________________________ Date:___________

## (undated) NOTE — MR AVS SNAPSHOT
After Visit Summary   12/7/2021    Gaby Figueredo   MRN: [id number]           Visit Information     Date & Time  12/7/2021  4:00 PM Provider  Alpa Mcdowell MD [place] [address], [place] [address], [facility] Dept.  Phone  [phone] for adult patients for a variety of conditions such as allergies, back pain and cold symptoms? Skip the drive and waiting room and online chat with a doctor face-to-face using your web-cam enabled computer or mobile device wherever you are.  Video Visits co

## (undated) NOTE — LETTER
OUTSIDE TESTING RESULT REQUEST     IMPORTANT: FOR YOUR IMMEDIATE ATTENTION  Please FAX all test results listed below to: [phone]     Testing already done on or about: 25     * * * * If testing is NOT complete, arrange with patient A.S.A.P. * * * *      Patient Name: Dalila Marie  Surgery Date: 2025  Medical Record: [id number]  CSN: [id number]  : 1960 - A: 64 y     Sex: female  Surgeon(s):  Poli Guajardo MD  Procedure: TRUCLEAR HYSTEROSCOPY, dilation, hysteroscopic endometrial sampling and curettage  Anesthesia Type: MAC     Surgeon: Poli Guajardo MD     The following Testing and Time Line are REQUIRED PER ANESTHESIA     CBC, Platelet; NO Differential within  30 days      Thank You,   Sent by:KRISTIE Gallo

## (undated) NOTE — LETTER
[facility] Pre-Admission Testing Department  Phone: [phone]  OUTSIDE TESTING RESULT REQUEST      TO:   Dr. Reed Dixon Today's Date: 25    FAX #: [phone], [phone]     IMPORTANT: FOR YOUR IMMEDIATE ATTENTION  Please FAX all test results listed below to: [phone]     Testing already done on or about:      * * * * If testing is NOT complete, arrange with patient A.S.A.P. * * * *      Patient Name: Dalila Marie  Surgery Date: 2025    CSN: [id number]  Medical Record: [id number]   : 1960 - A: 64 y      Sex: female  Surgeon(s):  Poli Guajardo MD  Procedure: TRUCLEAR HYSTEROSCOPY, dilation, hysteroscopic endometrial sampling and curettage  Anesthesia Type: MAC     Surgeon: Poli Guajardo MD       The following Testing and Time Line are REQUIRED PER ANESTHESIA     CBC, Platelet; NO Differential within  30 days      Thank You,   Sent by:KRISTIE Gallo      CONFIDENTIALITY NOTICE  This transmission is intended only for the use of the individual or entity to which it is addressed and may contain information that is privileged and confidential.  If the reader of this message is not the intended recipient, you are hereby notified that any disclosure, distribution, or copying of this information is strictly prohibited.  If you have received this transmission in error, please notify us immediately by telephone, and return the original documents to us at the address listed above.

## (undated) NOTE — LETTER
Discharge Instructions  Stereotactic / Ultrasound / MRI Core Biopsy     1. Place an ice pack on top of the biopsy site in your bra OR the folds of the Ace wrap for 10-15 minutes of every hour until bedtime for your comfort and to decrease bleeding.       2. by calling [phone].  You will need an order for this exam from your referring physician.       8/2019

## (undated) NOTE — LETTER
Dalila Marie, :1960    CONSENT FOR PROCEDURE/SEDATION    1. I authorize the performance upon Dalila Marie  the following: Endometrial biopsy procedure    2. I authorize Dr. Poli Guajardo MD (and whomever is designated as the doctor’s assistant), to perform the above-mentioned procedures.    3. If any unforeseen conditions arise during this procedure calling for additional  procedures, operations, or medications (including anesthesia and blood transfusion), I further request and authorize the doctor to do whatever he/she deems advisable in my interest.    4. I consent to the taking and reproduction of any photographs in the course of this procedure for professional purposes.    5. I consent to the administration of such sedation as may be considered necessary or advisable by the physician responsible for this service, with the exception of ______________________________________________________    6. I have been informed by my doctor of the nature and purpose of this procedure sedation, possible alternative methods of treatment, risk involved and possible complications.    7. If I have a Do Not Resuscitate (DNR) order in place, the physician and I (or the individual authorized to consent on my behalf) will discuss and agree as to whether the Do Not Resuscitate (DNR) order will remain in effect or will be discontinued during the performance of the procedure and the applicable recovery period. If the Do Not Resuscitate (DNR) order is discontinued and is to be reinstated following the procedure/recovery period, the physician will determine when the applicable recovery period ends for purposes of reinstating the Do Not Resuscitate (DNR) order.    Signature of Patient:_______________________________________________    Signature of person authorized to consent for patient:  _______________________________________________________________    Relationship to patient:  ____________________________________________    Witness: _________________________________________ Date:___________     Physician Signature: _______________________________ Date:___________

## (undated) NOTE — LETTER
To:  Dr. Dixon  Date:  2025  Patient Name: Dalila Marie  -Age / Sex: 1960-A: 64 y  female  Medical Records: [id number]   CSN: [id number]      Clearance for Surgery Requested by Surgeon    Request for:  Medical Clearance    Requested by Surgeon: Poli Guajardo MD    Surgical Date: 2025    Procedure: TRUCLEAR HYSTEROSCOPY, dilation, hysteroscopic endometrial sampling and curettage, N/A      Please fax the clearance note to the Pre-Admission Testing department.  Thank you.

## (undated) NOTE — LETTER
[address] [place] [address]      Authorization for Surgical Operation and Procedure     Date:___________                                                                                                         Time:_______ but not all, of the potential risks that can occur: fever and allergic reactions, hemolytic reactions, transmission of diseases such as Hepatitis, AIDS and Cytomegalovirus (CMV) and fluid overload.   In the event that I wish to have an autologous transfusio attending physician will determine when the applicable recovery period ends for purposes of reinstating the DNAR order.   10. Patients having a sterilization procedure: I understand that if the procedure is successful the results will be permanent and it wi _______________________________________________________________ _____________________________  Radha Riley  Physician)                                                                                         (Date)                                   Surya[place]

## (undated) NOTE — LETTER
[address] [facility] [address], [place]  [address]  INFORMED CONSENT FOR TRANSFUSION OF BLOOD OR BLOOD PRODUCTS  My physician has informed me of the nature, purpose, benefits and risks of transfusion for blood and blood components that ______________________________________________  (Signature of Patient)                                                            (Responsible party in case of Minor,

## (undated) NOTE — LETTER
Re:  Dalila Marie, :  1960      Dear Dr. Dixon,    I am writing in regard to Dalila Marie who is scheduled for dilation, hysteroscopy, hysteroscopic endometrial sampling and curettage on May 13, 2025.  I would like her to have a pre-op appointment with you prior to her surgery.  You should be receiving the anesthesiologist recommended pre-op testing order from the pre-admission department, with a list of tests which she will require prior to surgery.      If you do not receive the order, please contact the pre-admissions department.      If you have any other questions or concerns, please feel free to contact me.    Thank you,      Poli Guajardo MD